# Patient Record
Sex: FEMALE | Race: BLACK OR AFRICAN AMERICAN | NOT HISPANIC OR LATINO | Employment: FULL TIME | URBAN - METROPOLITAN AREA
[De-identification: names, ages, dates, MRNs, and addresses within clinical notes are randomized per-mention and may not be internally consistent; named-entity substitution may affect disease eponyms.]

---

## 2017-08-01 ENCOUNTER — ALLSCRIPTS OFFICE VISIT (OUTPATIENT)
Dept: OTHER | Facility: OTHER | Age: 52
End: 2017-08-01

## 2017-08-01 ENCOUNTER — GENERIC CONVERSION - ENCOUNTER (OUTPATIENT)
Dept: OTHER | Facility: OTHER | Age: 52
End: 2017-08-01

## 2017-08-01 ENCOUNTER — TRANSCRIBE ORDERS (OUTPATIENT)
Dept: LAB | Facility: CLINIC | Age: 52
End: 2017-08-01

## 2017-08-01 ENCOUNTER — APPOINTMENT (OUTPATIENT)
Dept: LAB | Facility: CLINIC | Age: 52
End: 2017-08-01
Payer: COMMERCIAL

## 2017-08-01 DIAGNOSIS — L20.82 FLEXURAL ECZEMA: ICD-10-CM

## 2017-08-01 DIAGNOSIS — R10.12 LEFT UPPER QUADRANT PAIN: ICD-10-CM

## 2017-08-01 DIAGNOSIS — J45.909 UNCOMPLICATED ASTHMA: ICD-10-CM

## 2017-08-01 DIAGNOSIS — E66.3 OVERWEIGHT(278.02): ICD-10-CM

## 2017-08-01 DIAGNOSIS — Z12.31 ENCOUNTER FOR SCREENING MAMMOGRAM FOR MALIGNANT NEOPLASM OF BREAST: ICD-10-CM

## 2017-08-01 LAB
25(OH)D3 SERPL-MCNC: 12.1 NG/ML (ref 30–100)
ALBUMIN SERPL BCP-MCNC: 4 G/DL (ref 3.5–5)
ALP SERPL-CCNC: 84 U/L (ref 46–116)
ALT SERPL W P-5'-P-CCNC: 21 U/L (ref 12–78)
ANION GAP SERPL CALCULATED.3IONS-SCNC: 9 MMOL/L (ref 4–13)
AST SERPL W P-5'-P-CCNC: 23 U/L (ref 5–45)
BASOPHILS # BLD AUTO: 0.01 THOUSANDS/ΜL (ref 0–0.1)
BASOPHILS NFR BLD AUTO: 0 % (ref 0–1)
BILIRUB SERPL-MCNC: 0.39 MG/DL (ref 0.2–1)
BUN SERPL-MCNC: 13 MG/DL (ref 5–25)
CALCIUM SERPL-MCNC: 9.3 MG/DL (ref 8.3–10.1)
CHLORIDE SERPL-SCNC: 107 MMOL/L (ref 100–108)
CHOLEST SERPL-MCNC: 176 MG/DL (ref 50–200)
CO2 SERPL-SCNC: 25 MMOL/L (ref 21–32)
CREAT SERPL-MCNC: 0.79 MG/DL (ref 0.6–1.3)
CREAT UR-MCNC: 188 MG/DL
EOSINOPHIL # BLD AUTO: 0.08 THOUSAND/ΜL (ref 0–0.61)
EOSINOPHIL NFR BLD AUTO: 1 % (ref 0–6)
ERYTHROCYTE [DISTWIDTH] IN BLOOD BY AUTOMATED COUNT: 15.1 % (ref 11.6–15.1)
GFR SERPL CREATININE-BSD FRML MDRD: 100 ML/MIN/1.73SQ M
GLUCOSE P FAST SERPL-MCNC: 110 MG/DL (ref 65–99)
HCT VFR BLD AUTO: 37 % (ref 34.8–46.1)
HDLC SERPL-MCNC: 46 MG/DL (ref 40–60)
HGB BLD-MCNC: 11.7 G/DL (ref 11.5–15.4)
LDLC SERPL CALC-MCNC: 102 MG/DL (ref 0–100)
LYMPHOCYTES # BLD AUTO: 1.99 THOUSANDS/ΜL (ref 0.6–4.47)
LYMPHOCYTES NFR BLD AUTO: 33 % (ref 14–44)
MCH RBC QN AUTO: 25.8 PG (ref 26.8–34.3)
MCHC RBC AUTO-ENTMCNC: 31.6 G/DL (ref 31.4–37.4)
MCV RBC AUTO: 82 FL (ref 82–98)
MICROALBUMIN UR-MCNC: 10.4 MG/L (ref 0–20)
MICROALBUMIN/CREAT 24H UR: 6 MG/G CREATININE (ref 0–30)
MONOCYTES # BLD AUTO: 0.39 THOUSAND/ΜL (ref 0.17–1.22)
MONOCYTES NFR BLD AUTO: 7 % (ref 4–12)
NEUTROPHILS # BLD AUTO: 3.55 THOUSANDS/ΜL (ref 1.85–7.62)
NEUTS SEG NFR BLD AUTO: 59 % (ref 43–75)
NRBC BLD AUTO-RTO: 0 /100 WBCS
PLATELET # BLD AUTO: 338 THOUSANDS/UL (ref 149–390)
PMV BLD AUTO: 10.9 FL (ref 8.9–12.7)
POTASSIUM SERPL-SCNC: 3.7 MMOL/L (ref 3.5–5.3)
PROT SERPL-MCNC: 8 G/DL (ref 6.4–8.2)
RBC # BLD AUTO: 4.54 MILLION/UL (ref 3.81–5.12)
SODIUM SERPL-SCNC: 141 MMOL/L (ref 136–145)
TRIGL SERPL-MCNC: 141 MG/DL
TSH SERPL DL<=0.05 MIU/L-ACNC: 2.04 UIU/ML (ref 0.36–3.74)
WBC # BLD AUTO: 6.04 THOUSAND/UL (ref 4.31–10.16)

## 2017-08-01 PROCEDURE — 80061 LIPID PANEL: CPT

## 2017-08-01 PROCEDURE — 82306 VITAMIN D 25 HYDROXY: CPT

## 2017-08-01 PROCEDURE — 80053 COMPREHEN METABOLIC PANEL: CPT

## 2017-08-01 PROCEDURE — 36415 COLL VENOUS BLD VENIPUNCTURE: CPT

## 2017-08-01 PROCEDURE — 84443 ASSAY THYROID STIM HORMONE: CPT

## 2017-08-01 PROCEDURE — 85025 COMPLETE CBC W/AUTO DIFF WBC: CPT

## 2017-08-01 PROCEDURE — 82043 UR ALBUMIN QUANTITATIVE: CPT

## 2017-08-01 PROCEDURE — 82570 ASSAY OF URINE CREATININE: CPT

## 2017-08-18 ENCOUNTER — HOSPITAL ENCOUNTER (OUTPATIENT)
Dept: ULTRASOUND IMAGING | Facility: HOSPITAL | Age: 52
Discharge: HOME | End: 2017-08-18
Payer: COMMERCIAL

## 2017-08-18 DIAGNOSIS — R10.12 LEFT UPPER QUADRANT PAIN: ICD-10-CM

## 2017-08-18 PROCEDURE — 76700 US EXAM ABDOM COMPLETE: CPT

## 2017-08-21 ENCOUNTER — HOSPITAL ENCOUNTER (OUTPATIENT)
Dept: MAMMOGRAPHY | Facility: HOSPITAL | Age: 52
Discharge: HOME | End: 2017-08-21
Payer: COMMERCIAL

## 2017-08-21 DIAGNOSIS — L20.82 FLEXURAL ECZEMA: ICD-10-CM

## 2017-08-21 DIAGNOSIS — Z12.31 ENCOUNTER FOR SCREENING MAMMOGRAM FOR MALIGNANT NEOPLASM OF BREAST: ICD-10-CM

## 2017-08-21 PROCEDURE — G0202 SCR MAMMO BI INCL CAD: HCPCS

## 2017-09-06 ENCOUNTER — GENERIC CONVERSION - ENCOUNTER (OUTPATIENT)
Dept: OTHER | Facility: OTHER | Age: 52
End: 2017-09-06

## 2017-09-07 ENCOUNTER — GENERIC CONVERSION - ENCOUNTER (OUTPATIENT)
Dept: OTHER | Facility: OTHER | Age: 52
End: 2017-09-07

## 2018-01-15 VITALS
BODY MASS INDEX: 29.45 KG/M2 | HEIGHT: 64 IN | TEMPERATURE: 96 F | RESPIRATION RATE: 16 BRPM | OXYGEN SATURATION: 99 % | SYSTOLIC BLOOD PRESSURE: 130 MMHG | DIASTOLIC BLOOD PRESSURE: 74 MMHG | WEIGHT: 172.5 LBS | HEART RATE: 78 BPM

## 2018-01-15 NOTE — RESULT NOTES
Discussion/Summary   pls let pt know mammogram was normal   abdominal US shows a small cyst in the liver unchanged compared to when she had the CT scan done  Nothing to do at this time      Verified Results  * Bishop Nieves CAD [id number] 09:45AM Jimena Block Order Number: [id number]    - Patient Instructions: To schedule this appointment, please contact Central Scheduling at [phone]  Do not wear any perfume, powder, lotion or deodorant on breast or underarm area  Please bring your doctors order, referral (if needed) and insurance information with you on the day of the test  Failure to bring this information may result in this test being rescheduled  Arrive 15 minutes prior to your appointment time to register  On the day of your test, please bring any prior mammogram or breast studies with you that were not performed at a [facility]  Failure to bring prior exams may result in your test needing to be rescheduled  Test Name Result Flag Reference   MAMMO SCREENING BILATERAL W CAD (Report)     Patient History:   Patient is postmenopausal    Family history of breast cancer at age 39 in maternal aunt,    breast cancer at age 48 or over in maternal aunt  No Hormone Replacement Therapy   Patient has never smoked  Patient's BMI is 27 8  Reason for exam: screening, asymptomatic  Mammo Screening Bilateral W CAD: August 21, 2017 - Check In #:    [de-identified]   Bilateral MLO and CC view(s) were taken  Technologist: SANDEE Nixon (SANDEE)(M)   Prior study comparison: August 10, 2015, bilateral AMA dig scrn    mamm w/CAD performed at [place] [address]  April 28, 2014, bilateral digital screening mammogram, performed    at [place] [address]  2011, bilateral    screening mammogram, performed at [facility]  There are scattered fibroglandular densities       No dominant soft tissue mass, architectural distortion or    suspicious calcifications are noted in either breast  The skin    and nipple contours are within normal limits  No evidence of malignancy  No significant changes when compared with prior studies  ACR BI-RADSï¾® Assessments: BiRad:1 - Negative     Recommendation:   Routine screening mammogram of both breasts in 1 year  Analyzed by CAD     The patient is scheduled in a reminder system  8-10% of cancers will be missed on mammography  Management of a    palpable abnormality must be based on clinical grounds  Patients   will be notified of their results via letter from our facility  Accredited by [employer] of [employer] and FDA  Transcription Location: SANDEE Morejon 98: [id number]     Risk Value(s):   Tyrer-Cuzick 10 Year: 2 900%, Tyrer-Cuzick Lifetime: 11 700%,    Myriad Table: 2 6%, LUISA 5 Year: 0 9%, NCI Lifetime: 6 6%, MRS    : Based on personal and/or family history,    consideration of hereditary risk assessment may be warranted  Signed by:   Zohreh Freeman MD   8/21/17     * US ABDOMEN COMPLETE [id number] 09:02AM Cheryl Yi    Order Number: [id number]    - Patient Instructions: To schedule this appointment, please contact Central Scheduling at [phone]  Test Name Result Flag Reference   US ABDOMEN COMPLETE (Report)     ABDOMEN ULTRASOUND, COMPLETE     INDICATION: Follow-up liver lesion     COMPARISON: 2/4/2013     TECHNIQUE:  Real-time ultrasound of the abdomen was performed with a curvilinear transducer with both volumetric sweeps and still imaging techniques  FINDINGS:     PANCREAS: Visualized portions of the pancreas are within normal limits  AORTA AND IVC: Visualized portions are normal for patient age  LIVER:   Size: Within normal range  The liver measures 15 6 cm in the midclavicular line  Contour: Surface contour is smooth  Parenchyma: Echogenicity and echotexture are within normal limits     No evidence of suspicious mass  There is a simple appearing cyst in the posterior right lobe measuring 3 1 x 2 4 x 4 3 cm  There is a thin septation  There is no internal vascularity  The appearance is similar to the prior CT  The main portal vein is patent and hepatopetal       BILIARY:   The gallbladder is normal in caliber  No wall thickening or pericholecystic fluid  No stones or sludge identified  Sonographic Winnie Pages sign is negative  No intrahepatic biliary dilatation  CBD measures 5 mm  No choledocholithiasis  KIDNEY:    Right kidney measures 10 6 x 3 3 cm  Within normal limits  Left kidney measures 11 7 x 6 1 cm  Within normal limits  SPLEEN:    Measures 11 3 cm  Within normal limits  ASCITES: None  IMPRESSION:     Septated, 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

## 2018-01-15 NOTE — RESULT NOTES
Discussion/Summary   please let pt know her BW looks good except for her Vit D which is very low at 12, should be at least 30 ideally closer to 60  Sent in Vit D 45591 IU take once weekly  Rest of her labs are all good      Verified Results  (1) CBC/PLT/DIFF [id number] 10:14AM [facility] Order Number: [id number]     Test Name Result Flag Reference   WBC COUNT 6 04 Thousand/uL  4 31-10 16   RBC COUNT 4 54 Million/uL  3 81-5 12   HEMOGLOBIN 11 7 g/dL  11 5-15 4   HEMATOCRIT 37 0 %  34 8-46  1   MCV 82 fL  82-98   MCH 25 8 pg L 26 8-34 3   MCHC 31 6 g/dL  31 4-37 4   RDW 15 1 %  11 6-15 1   MPV 10 9 fL  8 9-12 7   PLATELET COUNT 541 Thousands/uL  149-390   nRBC AUTOMATED 0 /100 WBCs     NEUTROPHILS RELATIVE PERCENT 59 %  43-75   LYMPHOCYTES RELATIVE PERCENT 33 %  14-44   MONOCYTES RELATIVE PERCENT 7 %  4-12   EOSINOPHILS RELATIVE PERCENT 1 %  0-6   BASOPHILS RELATIVE PERCENT 0 %  0-1   NEUTROPHILS ABSOLUTE COUNT 3 55 Thousands/? ??L  1 85-7 62   LYMPHOCYTES ABSOLUTE COUNT 1 99 Thousands/? ??L  0 60-4 47   MONOCYTES ABSOLUTE COUNT 0 39 Thousand/? ??L  0 17-1 22   EOSINOPHILS ABSOLUTE COUNT 0 08 Thousand/? ??L  0 00-0 61   BASOPHILS ABSOLUTE COUNT 0 01 Thousands/? ??L  0 00-0 10     (1) COMPREHENSIVE METABOLIC PANEL [id number] 48:45GQ [facility] Order Number: [id number]     Test Name Result Flag Reference   SODIUM 141 mmol/L  136-145   POTASSIUM 3 7 mmol/L  3 5-5 3   CHLORIDE 107 mmol/L  100-108   CARBON DIOXIDE 25 mmol/L  21-32   ANION GAP (CALC) 9 mmol/L  4-13   BLOOD UREA NITROGEN 13 mg/dL  5-25   CREATININE 0 79 mg/dL  0 60-1 30   Standardized to IDMS reference method   CALCIUM 9 3 mg/dL  8 3-10 1   BILI, TOTAL 0 39 mg/dL  0 20-1 00   ALK PHOSPHATAS 84 U/L     ALT (SGPT) 21 U/L  12-78   AST(SGOT) 23 U/L  5-45   ALBUMIN 4 0 g/dL  3 5-5 0   TOTAL PROTEIN 8 0 g/dL  6 4-8 2   eGFR 100 ml/min/1 73sq m     National Kidney Disease Education Program recommendations are as follows:  GFR calculation is accurate only with a steady state creatinine  Chronic Kidney disease less than 60 ml/min/1 73 sq  meters  Kidney failure less than 15 ml/min/1 73 sq  meters  GLUCOSE FASTING 110 mg/dL H 65-99     (1) LIPID PANEL FASTING W DIRECT LDL REFLEX 01Aug2017 10:14AM Marie Form   [employer] Order Number: [id number]     Test Name Result Flag Reference   CHOLESTEROL 176 mg/dL     LDL CHOLESTEROL CALCULATED 102 mg/dL H 0-100   Triglyceride:         Normal              <150 mg/dl       Borderline High    150-199 mg/dl       High               200-499 mg/dl       Very High          >499 mg/dl  Cholesterol:         Desirable        <200 mg/dl      Borderline High  200-239 mg/dl      High             >239 mg/dl  HDL Cholesterol:        High    >59 mg/dL      Low     <41 mg/dL  LDL Cholesterol:        Optimal          <100 mg/dl        Near Optimal     100-129 mg/dl        Above Optimal          Borderline High   130-159 mg/dl          High              160-189 mg/dl          Very High        >189 mg/dl  LDL CALCULATED:    This screening LDL is a calculated result  It does not have the accuracy of the Direct Measured LDL in the monitoring of patients with hyperlipidemia and/or statin therapy  Direct Measure LDL (HJV544) must be ordered separately in these patients  TRIGLYCERIDES 141 mg/dL  <=150   Specimen collection should occur prior to N-Acetylcysteine or Metamizole administration due to the potential for falsely depressed results  HDL,DIRECT 46 mg/dL  40-60   Specimen collection should occur prior to Metamizole administration due to the potential for falsely depressed results       (1) MICROALBUMIN CREATININE RATIO, RANDOM URINE 01Aug2017 10:14AM Marie Form   [employer] Order Number: [id number]     Test Name Result Flag Reference   MICROALBUMIN/ CREAT R 6 mg/g creatinine  0-30   MICROALBUMIN,URINE 10 4 mg/L  0 0-20 0   CREATININE URINE 188 0 mg/dL       (1) TSH WITH FT4 REFLEX [id number] 10: 14AM Roland Espinosa Order Number: [id number]     Test Name Result Flag Reference   TSH 2 040 uIU/mL  0 358-3 740   Patients undergoing fluorescein dye angiography may retain small amounts of fluorescein in the body for 48-72 hours post procedure  Samples containing fluorescein can produce falsely depressed TSH values  If the patient had this procedure,a specimen should be resubmitted post fluorescein clearance  The recommended reference ranges for TSH during pregnancy are as follows:  First trimester 0 1 to 2 5 uIU/mL  Second trimester  0 2 to 3 0 uIU/mL  Third trimester 0 3 to 3 0 uIU/m     (1) VITAMIN D 25-HYDROXY [id number] 10:14AM Cami Gundreson    Order Number: [id number]     Test Name Result Flag Reference   VIT D 25-HYDROX 12 1 ng/mL L 30 0-100 0   This assay is a certified procedure of the CDC Vitamin D Standardization Certification Program (VDSCP)     Deficiency <20ng/ml   Insufficiency 20-30ng/ml   Sufficient  ng/ml     *Patients undergoing fluorescein dye angiography may retain small amounts of fluorescein in the body for 48-72 hours post procedure  Samples containing fluorescein can produce falsely elevated Vitamin D values  If the patient had this procedure, a specimen should be resubmitted post fluorescein clearance         Signatures   Electronically signed by : Lorrain Closs, M D ; Aug  1 2017  4:41PM EST                       (Author)

## 2018-01-22 VITALS
HEART RATE: 86 BPM | DIASTOLIC BLOOD PRESSURE: 76 MMHG | BODY MASS INDEX: 29.46 KG/M2 | RESPIRATION RATE: 18 BRPM | SYSTOLIC BLOOD PRESSURE: 118 MMHG | TEMPERATURE: 97.2 F | WEIGHT: 172.56 LBS | HEIGHT: 64 IN | OXYGEN SATURATION: 98 %

## 2018-02-20 RX ORDER — ERGOCALCIFEROL 1.25 MG/1
CAPSULE ORAL
Qty: 4 CAPSULE | Refills: 0 | OUTPATIENT
Start: 2018-02-20

## 2018-02-28 ENCOUNTER — HOSPITAL ENCOUNTER (EMERGENCY)
Facility: HOSPITAL | Age: 53
Discharge: HOME | End: 2018-02-28
Attending: EMERGENCY MEDICINE | Admitting: EMERGENCY MEDICINE
Payer: COMMERCIAL

## 2018-02-28 VITALS
SYSTOLIC BLOOD PRESSURE: 144 MMHG | WEIGHT: 167 LBS | RESPIRATION RATE: 18 BRPM | DIASTOLIC BLOOD PRESSURE: 68 MMHG | HEART RATE: 102 BPM | TEMPERATURE: 98.2 F | OXYGEN SATURATION: 98 % | BODY MASS INDEX: 28.67 KG/M2

## 2018-02-28 DIAGNOSIS — J06.9 VIRAL URI WITH COUGH: Primary | ICD-10-CM

## 2018-02-28 PROCEDURE — 99283 EMERGENCY DEPT VISIT LOW MDM: CPT

## 2018-02-28 RX ORDER — ACETAMINOPHEN 500 MG
1000 TABLET ORAL EVERY 6 HOURS PRN
Qty: 30 TABLET | Refills: 0 | Status: SHIPPED | OUTPATIENT
Start: 2018-02-28 | End: 2019-04-25 | Stop reason: ALTCHOICE

## 2018-02-28 RX ORDER — ONDANSETRON 4 MG/1
4 TABLET, FILM COATED ORAL EVERY 12 HOURS PRN
Qty: 10 TABLET | Refills: 0 | Status: SHIPPED | OUTPATIENT
Start: 2018-02-28 | End: 2018-03-09

## 2018-02-28 RX ORDER — IBUPROFEN 600 MG/1
600 TABLET ORAL EVERY 6 HOURS PRN
Qty: 30 TABLET | Refills: 0 | Status: SHIPPED | OUTPATIENT
Start: 2018-02-28 | End: 2019-04-25 | Stop reason: ALTCHOICE

## 2018-02-28 RX ORDER — FAMOTIDINE 20 MG/1
20 TABLET, FILM COATED ORAL DAILY
COMMUNITY
End: 2020-02-13

## 2018-02-28 RX ORDER — ALBUTEROL SULFATE 90 UG/1
2 AEROSOL, METERED RESPIRATORY (INHALATION) EVERY 6 HOURS PRN
COMMUNITY
End: 2019-05-13 | Stop reason: SDUPTHER

## 2018-02-28 RX ORDER — GUAIFENESIN 600 MG
1200 TABLET, EXTENDED RELEASE 12 HR ORAL EVERY 12 HOURS SCHEDULED
Qty: 28 TABLET | Refills: 0 | Status: SHIPPED | OUTPATIENT
Start: 2018-02-28 | End: 2018-03-07

## 2018-02-28 NOTE — DISCHARGE INSTRUCTIONS
Ibuprofen 600mg by mouth every 6 hours as needed for mild to moderate pain or fever  Acetaminophen 650mg by mouth every 8 hours as needed for mild to moderate pain or fever  You can take Ibuprofen and Acetaminophen together safely  Mucinex as needed for cough  Zofran as needed for nausea and vomiting  The most important thing is that you stay hydrated  Pedialyte is best to help replenish electrolytes  Follow up with your primary care doctor in 3-5 days for reevaluation and to ensure you are getting better  Return to the ED for worsening fevers that are not controlled with BOTH Ibuprofen and Tylenol, chest pain, shortness of breath, or any other concerns  Upper Respiratory Infection, Ambulatory Care   GENERAL INFORMATION:   An upper respiratory infection  is also called a common cold  It can affect your nose, throat, ears, and sinuses  Common symptoms include the following:   · Runny or stuffy nose    · Sneezing and coughing    · Sore throat or hoarseness    · Red, watery, and sore eyes    · Tiredness or restlessness    · Chills and fever    · Headache, body aches, or sore muscles  Seek immediate care for the following symptoms:   · Headaches or a stiff neck    · Bright lights hurt your eyes    · Chest pain or trouble breathing  Treatment for an upper respiratory infection  may include any of the following:  · Decongestants  help decrease nasal congestion and improve your breathing  Do not use decongestant sprays for more than a few days  · Cough suppressants  help decrease coughing  Ask your healthcare provider which type of cough medicine is best for you  Some cough medicines need a doctor's order  · NSAIDs  help decrease swelling and pain or fever  This medicine is available with or without a doctor's order  NSAIDs can cause stomach bleeding or kidney problems in certain people  If you take blood thinner medicine, always ask your healthcare provider if NSAIDs are safe for you   Always read the medicine label and follow directions  Care for an upper respiratory infection:   · Rest  until your fever is gone or you feel better  · Drink liquids as directed to prevent dehydration  You may need to drink 8 to 10 cups of liquid each day  Good liquids to drink include water, ginger ale, tea, or fruit juices  · Gargle  with warm salt water to help your sore throat feel better  Mix ¼ teaspoon salt with 1 cup warm water  You may also suck on hard candy or throat lozenges  · Saline nasal drops  help loosen your nasal congestion  They can be bought without a doctor's order  · Take a warm bath or shower  to help decrease body aches and help you breathe easier  · Use a cool-mist humidifier  to increase air moisture and make it easier for you to breathe  Prevent the spread of germs:   · Avoid others for the first 2 to 3 days of your cold  Germs are easily spread during this time  · Do not share food, drinks,  towels, or personal items with others  · Wash your hands often  Use soap and water  Wash your hands after you use the bathroom, change a child's diapers, or sneeze  Wash your hands before you prepare or eat food  Cover your mouth and nose with a tissue when you sneeze or cough  Follow up with your healthcare provider as directed:  Write down your questions so you remember to ask them during your visits  CARE AGREEMENT:   You have the right to help plan your care  Learn about your health condition and how it may be treated  Discuss treatment options with your caregivers to decide what care you want to receive  You always have the right to refuse treatment  The above information is an  only  It is not intended as medical advice for individual conditions or treatments  Talk to your doctor, nurse or pharmacist before following any medical regimen to see if it is safe and effective for you    © 2014 [address] [employer] [employer] is for End User's use only and may not be sold, redistributed or otherwise used for commercial purposes  All illustrations and images included in CareNotes® are the copyrighted property of A D A M , Inc  or Gregory Romero

## 2018-02-28 NOTE — ED PROVIDER NOTES
History  Chief Complaint   Patient presents with    Flu Symptoms     patient states symptoms since monday, reports chills, body aches, a cough, and dizziness  55-year-old female with past medical history of asthma, who presents to the emergency department for flu-like symptoms for the past 2 days  Endorses symptoms of low-grade fever, nasal congestion, rhinorrhea, generalized headache, lightheadedness, mild productive cough with green sputum, and generalized body aches  Patient also endorses mild shortness of breath yesterday that was quickly resolved using her albuterol inhaler  Denies any shortness of breath currently  Also endorses nausea and vomiting for 2 episodes yesterday  Denies any blood in the vomit  Denies any episodes today  Denies any chest pain, abdominal pain, diarrhea, urinary pain or frequency or urgency  Does have positive ill contacts at work, with people who have been formally diagnosed with the flu  Has attempted to resolve symptoms using their flu, Tylenol and 1 dose of Motrin 2 days ago with minimal improvement in symptoms  History provided by:  Patient   used: No        Prior to Admission Medications   Prescriptions Last Dose Informant Patient Reported? Taking? albuterol (PROVENTIL HFA,VENTOLIN HFA) 90 mcg/act inhaler   Yes Yes   Sig: Inhale 2 puffs every 6 (six) hours as needed for wheezing   famotidine (PEPCID) 20 mg tablet   Yes Yes   Sig: Take 20 mg by mouth daily      Facility-Administered Medications: None       Past Medical History:   Diagnosis Date    Asthma        History reviewed  No pertinent surgical history  History reviewed  No pertinent family history  I have reviewed and agree with the history as documented      Social History   Substance Use Topics    Smoking status: Never Smoker    Smokeless tobacco: Not on file    Alcohol use Yes      Comment: socially        Review of Systems   Constitutional: Positive for appetite change, fatigue and fever  Negative for chills  HENT: Positive for congestion and rhinorrhea  Negative for ear pain, facial swelling, sinus pain, sinus pressure, sore throat, trouble swallowing and voice change  Eyes: Negative  Respiratory: Positive for cough and shortness of breath  Negative for chest tightness and wheezing  Cardiovascular: Negative for chest pain, palpitations and leg swelling  Gastrointestinal: Positive for nausea and vomiting  Negative for abdominal pain, constipation and diarrhea  Genitourinary: Negative for dysuria, flank pain, frequency, hematuria, urgency, vaginal bleeding, vaginal discharge and vaginal pain  Musculoskeletal: Positive for myalgias  Negative for arthralgias, back pain, gait problem, joint swelling, neck pain and neck stiffness  Skin: Negative for color change, pallor, rash and wound  Neurological: Positive for light-headedness and headaches  Negative for dizziness, tremors, seizures, syncope, facial asymmetry, speech difficulty, weakness and numbness  Psychiatric/Behavioral: Negative  Physical Exam  ED Triage Vitals   Temperature Pulse Respirations Blood Pressure SpO2   02/28/18 1332 02/28/18 1333 02/28/18 1333 02/28/18 1333 02/28/18 1333   98 2 °F (36 8 °C) 102 18 144/68 98 %      Temp Source Heart Rate Source Patient Position - Orthostatic VS BP Location FiO2 (%)   02/28/18 1332 02/28/18 1333 02/28/18 1333 -- --   Oral Monitor Sitting        Pain Score       02/28/18 1332       7           Orthostatic Vital Signs  Vitals:    02/28/18 1333   BP: 144/68   Pulse: 102   Patient Position - Orthostatic VS: Sitting       Physical Exam   Constitutional: She is oriented to person, place, and time  She appears well-developed and well-nourished  HENT:   Head: Normocephalic and atraumatic  Bilateral Tms are non-bulging and without erythema  Posterior oropharynx with mild erythema  No edema  No tonsilar hypertrophy  Uvula is midline and non-edematous  Eyes: Conjunctivae and EOM are normal  Pupils are equal, round, and reactive to light  Neck: Normal range of motion  Neck supple  Cardiovascular: Normal rate, regular rhythm and intact distal pulses  Pulmonary/Chest: Effort normal and breath sounds normal  She has no wheezes  She has no rales  Abdominal: Soft  Bowel sounds are normal  She exhibits no distension  There is no tenderness  There is no rebound and no guarding  Musculoskeletal: Normal range of motion  She exhibits no edema or tenderness  Lymphadenopathy:     She has cervical adenopathy  Neurological: She is alert and oriented to person, place, and time  No cranial nerve deficit or sensory deficit  She exhibits normal muscle tone  Coordination normal    Skin: Skin is warm and dry  Capillary refill takes less than 2 seconds  Psychiatric: She has a normal mood and affect  Her behavior is normal    Nursing note and vitals reviewed  ED Medications  Medications - No data to display    Diagnostic Studies  Results Reviewed     None                 No orders to display              Procedures  Procedures       Phone Contacts  ED Phone Contact    ED Course  ED Course                                MDM  Number of Diagnoses or Management Options  Viral URI with cough:   Diagnosis management comments: 41-year-old female with past medical history of asthma, who presents to the emergency department for flu-like symptoms for the past 2 days  Differential Diagnosis includes but is not limited to:  Viral illness, viral bronchitis, viral bronchiolitis, viral upper respiratory infection, influenza, pneumonia  Low suspicion for meningitis, urinary tract infection, serious bacterial illness  Given normal lung exam, low suspicion for pneumonia  Patient is well-appearing  Patient is drinking, and tolerating oral fluids  Pt re-educated on the importance of fever control and oral hydration during this time    Instructed to follow up with primary care doctor in 3-5 days  CritCare Time    Disposition  Final diagnoses:   Viral URI with cough     Time reflects when diagnosis was documented in both MDM as applicable and the Disposition within this note     Time User Action Codes Description Comment    2/28/2018  2:02 PM Ashley Antony Add [J06 [id number],  B97 [id number]] Viral URI with cough       ED Disposition     ED Disposition Condition Comment    Discharge  [facility] discharge to home/self care  Condition at discharge: Good        Follow-up Information     Follow up With Specialties Details Why Jaskaran Ching MD Family Medicine In 1 week  [address] [place] [address]  [phone]          Discharge Medication List as of 2/28/2018  2:05 PM      START taking these medications    Details   acetaminophen (TYLENOL) 500 mg tablet Take 2 tablets (1,000 mg total) by mouth every 6 (six) hours as needed for mild pain, Starting Wed 2/28/2018, Print      guaiFENesin (MUCINEX) 600 mg 12 hr tablet Take 2 tablets (1,200 mg total) by mouth every 12 (twelve) hours for 7 days, Starting Wed 2/28/2018, Until Wed 3/7/2018, Print      ibuprofen (MOTRIN) 600 mg tablet Take 1 tablet (600 mg total) by mouth every 6 (six) hours as needed for moderate pain or fever, Starting Wed 2/28/2018, Print      ondansetron (ZOFRAN) 4 mg tablet Take 1 tablet (4 mg total) by mouth every 12 (twelve) hours as needed for nausea or vomiting for up to 5 days, Starting Wed 2/28/2018, Until Mon 3/5/2018, Print         CONTINUE these medications which have NOT CHANGED    Details   albuterol (PROVENTIL HFA,VENTOLIN HFA) 90 mcg/act inhaler Inhale 2 puffs every 6 (six) hours as needed for wheezing, Historical Med      famotidine (PEPCID) 20 mg tablet Take 20 mg by mouth daily, Historical Med           No discharge procedures on file      ED Provider  Electronically Signed by           Dayanna Connelly PA-C  02/28/18 3101

## 2018-03-09 ENCOUNTER — OFFICE VISIT (OUTPATIENT)
Dept: FAMILY MEDICINE CLINIC | Facility: CLINIC | Age: 53
End: 2018-03-09
Payer: COMMERCIAL

## 2018-03-09 VITALS
SYSTOLIC BLOOD PRESSURE: 142 MMHG | DIASTOLIC BLOOD PRESSURE: 76 MMHG | WEIGHT: 168 LBS | HEART RATE: 72 BPM | TEMPERATURE: 96.4 F | RESPIRATION RATE: 18 BRPM | HEIGHT: 63 IN | BODY MASS INDEX: 29.77 KG/M2

## 2018-03-09 DIAGNOSIS — E55.9 VITAMIN D DEFICIENCY: ICD-10-CM

## 2018-03-09 DIAGNOSIS — J06.9 ACUTE UPPER RESPIRATORY INFECTION: ICD-10-CM

## 2018-03-09 DIAGNOSIS — Z12.11 SCREENING FOR COLON CANCER: Primary | ICD-10-CM

## 2018-03-09 PROBLEM — L20.82 FLEXURAL ECZEMA: Status: ACTIVE | Noted: 2017-08-01

## 2018-03-09 PROBLEM — G56.01 CARPAL TUNNEL SYNDROME OF RIGHT WRIST: Status: ACTIVE | Noted: 2017-09-07

## 2018-03-09 PROBLEM — E66.3 OVERWEIGHT: Status: ACTIVE | Noted: 2017-08-01

## 2018-03-09 PROBLEM — J45.909 ASTHMA: Status: ACTIVE | Noted: 2017-08-01

## 2018-03-09 PROBLEM — R10.12 ABDOMINAL PAIN, LEFT UPPER QUADRANT: Status: ACTIVE | Noted: 2017-08-01

## 2018-03-09 PROBLEM — R12 HEART BURN: Status: ACTIVE | Noted: 2017-09-07

## 2018-03-09 PROCEDURE — 99214 OFFICE O/P EST MOD 30 MIN: CPT | Performed by: PHYSICIAN ASSISTANT

## 2018-03-09 PROCEDURE — 3008F BODY MASS INDEX DOCD: CPT | Performed by: PHYSICIAN ASSISTANT

## 2018-03-09 RX ORDER — ALBUTEROL SULFATE 2.5 MG/3ML
SOLUTION RESPIRATORY (INHALATION)
COMMUNITY
End: 2019-05-13

## 2018-03-09 RX ORDER — TRIAMCINOLONE ACETONIDE 0.25 MG/G
OINTMENT TOPICAL
Refills: 1 | COMMUNITY
Start: 2018-02-21 | End: 2019-05-13 | Stop reason: SDUPTHER

## 2018-03-09 RX ORDER — PROMETHAZINE HYDROCHLORIDE AND CODEINE PHOSPHATE 6.25; 1 MG/5ML; MG/5ML
5 SYRUP ORAL EVERY 4 HOURS PRN
Qty: 120 ML | Refills: 0 | Status: SHIPPED | OUTPATIENT
Start: 2018-03-09 | End: 2019-04-25 | Stop reason: ALTCHOICE

## 2018-03-09 NOTE — PROGRESS NOTES
Assessment/Plan:    Patient Instructions   Promethazine with codeine cough syrup as needed as directed  Avoid with working or driving because the drowsy side effects  Can continue Mucinex DM during the day if not utilizing the promethazine with codeine  Over-the-counter Sudafed as needed as package direct  Increase clear liquids  Continue Ventolin as needed  Follow-up if there is no improvement over the next week or any symptoms increase  Lab results have been reviewed  Vitamin D level is low at 12  Recommend over-the-counter vitamin D 3 5000 international units daily  Phone number given for Dr Henrique Katz for colonoscopy screening  [employer] software was used to dictate this note  It may contain errors with dictating incorrect words/spelling  Please contact provider directly for any questions  Diagnoses and all orders for this visit:    Screening for colon cancer  -     Ambulatory referral to Gastroenterology; Future    Acute upper respiratory infection  -     promethazine-codeine (PHENERGAN WITH CODEINE) 6 25-10 mg/5 mL syrup; Take 5 mL by mouth every 4 (four) hours as needed for cough    Other orders  -     albuterol (2 5 mg/3 mL) 0 083 % nebulizer solution; Inhale  -     triamcinolone (KENALOG) 0 025 % ointment;           Subjective:      Patient ID: Libby Gan is a 47 y o  female  Patient presents today for evaluation of a cough that she has had for about 1 and half weeks  On occasion she does cough up some yellow mucus  She states in the beginning she did have a fever and chills  She has not checked her temperature over the last several days  She does have some night sweats  She did go the emergency room last week and was told it was viral   She has been utilizing a Ventolin inhaler as needed and Mucinex DM with minimal relief  Denies any ear pain, throat pain  She has been getting some bilateral rib pain with coughing      She also states that she was here back in August and had some blood work done but never really had a chance to discuss the results thoroughly with a provider  She would like to review those results again  She was on the vitamin-D supplement prescribed but had a hard time remembering to take it once a week  She was not compliant with the medication  Currently out of the medication  She has not had her routine colonoscopy  The following portions of the patient's history were reviewed and updated as appropriate:   She  has a past medical history of Asthma  She   Patient Active Problem List    Diagnosis Date Noted    Acute upper respiratory infection 03/09/2018    Carpal tunnel syndrome of right wrist 09/07/2017    Heart burn 09/07/2017    Asthma 08/01/2017    Flexural eczema 08/01/2017    Overweight 08/01/2017    Vitamin D deficiency 08/01/2017    Abdominal pain, left upper quadrant 08/01/2017     She  has no past surgical history on file  Her family history is not on file  She  reports that she has never smoked  She does not have any smokeless tobacco history on file  She reports that she drinks alcohol  She reports that she does not use drugs    Current Outpatient Prescriptions   Medication Sig Dispense Refill    acetaminophen (TYLENOL) 500 mg tablet Take 2 tablets (1,000 mg total) by mouth every 6 (six) hours as needed for mild pain 30 tablet 0    albuterol (2 5 mg/3 mL) 0 083 % nebulizer solution Inhale      albuterol (PROVENTIL HFA,VENTOLIN HFA) 90 mcg/act inhaler Inhale 2 puffs every 6 (six) hours as needed for wheezing      famotidine (PEPCID) 20 mg tablet Take 20 mg by mouth daily      ibuprofen (MOTRIN) 600 mg tablet Take 1 tablet (600 mg total) by mouth every 6 (six) hours as needed for moderate pain or fever 30 tablet 0    promethazine-codeine (PHENERGAN WITH CODEINE) 6 25-10 mg/5 mL syrup Take 5 mL by mouth every 4 (four) hours as needed for cough 120 mL 0    triamcinolone (KENALOG) 0 025 % ointment   1     No current facility-administered medications for this visit  Current Outpatient Prescriptions on File Prior to Visit   Medication Sig    acetaminophen (TYLENOL) 500 mg tablet Take 2 tablets (1,000 mg total) by mouth every 6 (six) hours as needed for mild pain    albuterol (PROVENTIL HFA,VENTOLIN HFA) 90 mcg/act inhaler Inhale 2 puffs every 6 (six) hours as needed for wheezing    famotidine (PEPCID) 20 mg tablet Take 20 mg by mouth daily    ibuprofen (MOTRIN) 600 mg tablet Take 1 tablet (600 mg total) by mouth every 6 (six) hours as needed for moderate pain or fever    [DISCONTINUED] ondansetron (ZOFRAN) 4 mg tablet Take 1 tablet (4 mg total) by mouth every 12 (twelve) hours as needed for nausea or vomiting for up to 5 days     No current facility-administered medications on file prior to visit  She has No Known Allergies       Review of Systems   Constitutional: Positive for chills  Negative for fever  HENT: Positive for postnasal drip, rhinorrhea and voice change  Negative for ear pain and sore throat  Respiratory: Positive for cough  Negative for shortness of breath  Cardiovascular: Positive for chest pain  Gastrointestinal: Negative for abdominal pain and blood in stool  Objective: There were no vitals taken for this visit  Physical Exam   Constitutional: She appears well-developed and well-nourished  No distress  HENT:   Head: Normocephalic and atraumatic  Right Ear: External ear normal    Left Ear: External ear normal    Mouth/Throat: Oropharynx is clear and moist    Neck: Normal range of motion  Neck supple  No thyromegaly present  Cardiovascular: Normal rate, regular rhythm and normal heart sounds  Exam reveals no gallop and no friction rub  No murmur heard  Pulmonary/Chest: Effort normal and breath sounds normal  No respiratory distress  She has no wheezes  She has no rales  Abdominal: Soft  Bowel sounds are normal  She exhibits no mass  There is no tenderness  Musculoskeletal: Normal range of motion  Lymphadenopathy:     She has no cervical adenopathy  Neurological: She is alert  Skin: Skin is warm  Psychiatric: She has a normal mood and affect

## 2018-03-09 NOTE — PATIENT INSTRUCTIONS
Promethazine with codeine cough syrup as needed as directed  Avoid with working or driving because the drowsy side effects  Can continue Mucinex DM during the day if not utilizing the promethazine with codeine  Over-the-counter Sudafed as needed as package direct  Increase clear liquids  Continue Ventolin as needed  Follow-up if there is no improvement over the next week or any symptoms increase  Lab results have been reviewed  Vitamin D level is low at 12  Recommend over-the-counter vitamin D 3 5000 international units daily    Phone number given for Dr Natalia Zuleta for colonoscopy screening

## 2018-08-13 RX ORDER — TRIAMCINOLONE ACETONIDE 0.25 MG/G
OINTMENT TOPICAL
Qty: 80 G | Refills: 0 | OUTPATIENT
Start: 2018-08-13

## 2019-04-25 ENCOUNTER — OFFICE VISIT (OUTPATIENT)
Dept: OBGYN CLINIC | Facility: CLINIC | Age: 54
End: 2019-04-25

## 2019-04-25 VITALS
DIASTOLIC BLOOD PRESSURE: 80 MMHG | SYSTOLIC BLOOD PRESSURE: 140 MMHG | WEIGHT: 173 LBS | HEIGHT: 63 IN | BODY MASS INDEX: 30.65 KG/M2

## 2019-04-25 DIAGNOSIS — Z12.4 SCREENING FOR CERVICAL CANCER: ICD-10-CM

## 2019-04-25 DIAGNOSIS — Z11.3 SCREEN FOR STD (SEXUALLY TRANSMITTED DISEASE): ICD-10-CM

## 2019-04-25 DIAGNOSIS — Z01.419 ENCOUNTER FOR GYNECOLOGICAL EXAMINATION WITHOUT ABNORMAL FINDING: Primary | ICD-10-CM

## 2019-04-25 DIAGNOSIS — Z12.39 SCREENING FOR BREAST CANCER: ICD-10-CM

## 2019-04-25 PROCEDURE — 99386 PREV VISIT NEW AGE 40-64: CPT | Performed by: NURSE PRACTITIONER

## 2019-04-25 PROCEDURE — 87624 HPV HI-RISK TYP POOLED RSLT: CPT | Performed by: NURSE PRACTITIONER

## 2019-04-25 PROCEDURE — G0145 SCR C/V CYTO,THINLAYER,RESCR: HCPCS | Performed by: NURSE PRACTITIONER

## 2019-04-25 RX ORDER — ERGOCALCIFEROL (VITAMIN D2) 50 MCG
CAPSULE ORAL
COMMUNITY
End: 2022-07-07

## 2019-04-26 LAB
HPV HR 12 DNA CVX QL NAA+PROBE: NEGATIVE
HPV16 DNA CVX QL NAA+PROBE: NEGATIVE
HPV18 DNA CVX QL NAA+PROBE: NEGATIVE

## 2019-04-26 PROCEDURE — 87591 N.GONORRHOEAE DNA AMP PROB: CPT | Performed by: NURSE PRACTITIONER

## 2019-04-26 PROCEDURE — 87491 CHLMYD TRACH DNA AMP PROBE: CPT | Performed by: NURSE PRACTITIONER

## 2019-04-29 ENCOUNTER — HOSPITAL ENCOUNTER (OUTPATIENT)
Dept: MAMMOGRAPHY | Facility: HOSPITAL | Age: 54
Discharge: HOME | End: 2019-04-29
Payer: COMMERCIAL

## 2019-04-29 VITALS — WEIGHT: 173 LBS | BODY MASS INDEX: 30.65 KG/M2 | HEIGHT: 63 IN

## 2019-04-29 DIAGNOSIS — Z12.39 SCREENING FOR BREAST CANCER: ICD-10-CM

## 2019-04-29 LAB
C TRACH DNA SPEC QL NAA+PROBE: NEGATIVE
N GONORRHOEA DNA SPEC QL NAA+PROBE: NEGATIVE

## 2019-04-29 PROCEDURE — 77067 SCR MAMMO BI INCL CAD: CPT

## 2019-04-30 LAB
LAB AP GYN PRIMARY INTERPRETATION: NORMAL
Lab: NORMAL

## 2019-05-09 ENCOUNTER — HOSPITAL ENCOUNTER (OUTPATIENT)
Dept: MAMMOGRAPHY | Facility: CLINIC | Age: 54
Discharge: HOME | End: 2019-05-09
Payer: COMMERCIAL

## 2019-05-09 ENCOUNTER — HOSPITAL ENCOUNTER (OUTPATIENT)
Dept: ULTRASOUND IMAGING | Facility: CLINIC | Age: 54
Discharge: HOME | End: 2019-05-09
Payer: COMMERCIAL

## 2019-05-09 VITALS — HEIGHT: 63 IN | BODY MASS INDEX: 30.65 KG/M2 | WEIGHT: 173 LBS

## 2019-05-09 DIAGNOSIS — R92.8 ABNORMAL MAMMOGRAM: ICD-10-CM

## 2019-05-09 PROCEDURE — 76642 ULTRASOUND BREAST LIMITED: CPT

## 2019-05-09 PROCEDURE — G0279 TOMOSYNTHESIS, MAMMO: HCPCS

## 2019-05-09 PROCEDURE — 77065 DX MAMMO INCL CAD UNI: CPT

## 2019-05-13 ENCOUNTER — TELEPHONE (OUTPATIENT)
Dept: FAMILY MEDICINE CLINIC | Facility: CLINIC | Age: 54
End: 2019-05-13

## 2019-05-13 ENCOUNTER — LAB (OUTPATIENT)
Dept: LAB | Facility: HOSPITAL | Age: 54
End: 2019-05-13
Payer: COMMERCIAL

## 2019-05-13 ENCOUNTER — OFFICE VISIT (OUTPATIENT)
Dept: FAMILY MEDICINE CLINIC | Facility: CLINIC | Age: 54
End: 2019-05-13

## 2019-05-13 VITALS
RESPIRATION RATE: 18 BRPM | WEIGHT: 174 LBS | TEMPERATURE: 97.3 F | HEART RATE: 65 BPM | OXYGEN SATURATION: 98 % | SYSTOLIC BLOOD PRESSURE: 130 MMHG | HEIGHT: 63 IN | BODY MASS INDEX: 30.83 KG/M2 | DIASTOLIC BLOOD PRESSURE: 80 MMHG

## 2019-05-13 DIAGNOSIS — R73.9 ELEVATED BLOOD SUGAR: ICD-10-CM

## 2019-05-13 DIAGNOSIS — R12 HEART BURN: ICD-10-CM

## 2019-05-13 DIAGNOSIS — L20.82 FLEXURAL ECZEMA: ICD-10-CM

## 2019-05-13 DIAGNOSIS — J45.20 MILD INTERMITTENT ASTHMA WITHOUT COMPLICATION: ICD-10-CM

## 2019-05-13 DIAGNOSIS — Z00.00 PREVENTATIVE HEALTH CARE: ICD-10-CM

## 2019-05-13 DIAGNOSIS — M54.2 NECK PAIN: Primary | ICD-10-CM

## 2019-05-13 DIAGNOSIS — Z12.11 SCREENING FOR COLON CANCER: ICD-10-CM

## 2019-05-13 DIAGNOSIS — E55.9 VITAMIN D DEFICIENCY: ICD-10-CM

## 2019-05-13 DIAGNOSIS — E66.3 OVERWEIGHT: ICD-10-CM

## 2019-05-13 PROBLEM — R10.12 ABDOMINAL PAIN, LEFT UPPER QUADRANT: Status: RESOLVED | Noted: 2017-08-01 | Resolved: 2019-05-13

## 2019-05-13 PROBLEM — J06.9 ACUTE UPPER RESPIRATORY INFECTION: Status: RESOLVED | Noted: 2018-03-09 | Resolved: 2019-05-13

## 2019-05-13 LAB
25(OH)D3 SERPL-MCNC: 34.8 NG/ML (ref 30–100)
ALBUMIN SERPL BCP-MCNC: 4.6 G/DL (ref 3–5.2)
ALP SERPL-CCNC: 95 U/L (ref 43–122)
ALT SERPL W P-5'-P-CCNC: 14 U/L (ref 9–52)
ANION GAP SERPL CALCULATED.3IONS-SCNC: 6 MMOL/L (ref 5–14)
AST SERPL W P-5'-P-CCNC: 25 U/L (ref 14–36)
BILIRUB SERPL-MCNC: 0.4 MG/DL
BUN SERPL-MCNC: 12 MG/DL (ref 5–25)
CALCIUM SERPL-MCNC: 9.5 MG/DL (ref 8.4–10.2)
CHLORIDE SERPL-SCNC: 104 MMOL/L (ref 97–108)
CHOLEST SERPL-MCNC: 183 MG/DL
CO2 SERPL-SCNC: 30 MMOL/L (ref 22–30)
CREAT SERPL-MCNC: 0.86 MG/DL (ref 0.6–1.2)
GFR SERPL CREATININE-BSD FRML MDRD: 90 ML/MIN/1.73SQ M
GLUCOSE P FAST SERPL-MCNC: 92 MG/DL (ref 70–99)
HDLC SERPL-MCNC: 45 MG/DL (ref 40–59)
LDLC SERPL CALC-MCNC: 104 MG/DL
NONHDLC SERPL-MCNC: 138 MG/DL
POTASSIUM SERPL-SCNC: 3.8 MMOL/L (ref 3.6–5)
PROT SERPL-MCNC: 8 G/DL (ref 5.9–8.4)
SODIUM SERPL-SCNC: 140 MMOL/L (ref 137–147)
TRIGL SERPL-MCNC: 169 MG/DL

## 2019-05-13 PROCEDURE — 99396 PREV VISIT EST AGE 40-64: CPT | Performed by: PHYSICIAN ASSISTANT

## 2019-05-13 PROCEDURE — 36415 COLL VENOUS BLD VENIPUNCTURE: CPT

## 2019-05-13 PROCEDURE — 82306 VITAMIN D 25 HYDROXY: CPT

## 2019-05-13 PROCEDURE — 80053 COMPREHEN METABOLIC PANEL: CPT

## 2019-05-13 PROCEDURE — 99214 OFFICE O/P EST MOD 30 MIN: CPT | Performed by: PHYSICIAN ASSISTANT

## 2019-05-13 PROCEDURE — 80061 LIPID PANEL: CPT

## 2019-05-13 RX ORDER — ALBUTEROL SULFATE 90 UG/1
2 AEROSOL, METERED RESPIRATORY (INHALATION) EVERY 6 HOURS PRN
Qty: 1 EACH | Refills: 1 | Status: SHIPPED | OUTPATIENT
Start: 2019-05-13 | End: 2020-06-08 | Stop reason: SDUPTHER

## 2019-05-13 RX ORDER — TRIAMCINOLONE ACETONIDE 0.25 MG/G
OINTMENT TOPICAL 2 TIMES DAILY
Qty: 30 G | Refills: 1 | Status: SHIPPED | OUTPATIENT
Start: 2019-05-13 | End: 2020-06-08 | Stop reason: SDUPTHER

## 2019-05-15 ENCOUNTER — TELEPHONE (OUTPATIENT)
Dept: FAMILY MEDICINE CLINIC | Facility: CLINIC | Age: 54
End: 2019-05-15

## 2020-02-13 ENCOUNTER — OFFICE VISIT (OUTPATIENT)
Dept: GASTROENTEROLOGY | Facility: MEDICAL CENTER | Age: 55
End: 2020-02-13
Payer: COMMERCIAL

## 2020-02-13 VITALS
HEART RATE: 78 BPM | HEIGHT: 63 IN | TEMPERATURE: 97.4 F | WEIGHT: 172 LBS | DIASTOLIC BLOOD PRESSURE: 80 MMHG | SYSTOLIC BLOOD PRESSURE: 128 MMHG | BODY MASS INDEX: 30.48 KG/M2

## 2020-02-13 DIAGNOSIS — K21.9 GASTROESOPHAGEAL REFLUX DISEASE, ESOPHAGITIS PRESENCE NOT SPECIFIED: Primary | ICD-10-CM

## 2020-02-13 DIAGNOSIS — Z12.11 COLON CANCER SCREENING: ICD-10-CM

## 2020-02-13 PROCEDURE — 3008F BODY MASS INDEX DOCD: CPT | Performed by: INTERNAL MEDICINE

## 2020-02-13 PROCEDURE — 1036F TOBACCO NON-USER: CPT | Performed by: INTERNAL MEDICINE

## 2020-02-13 PROCEDURE — 99204 OFFICE O/P NEW MOD 45 MIN: CPT | Performed by: INTERNAL MEDICINE

## 2020-02-13 RX ORDER — OMEPRAZOLE 20 MG/1
20 CAPSULE, DELAYED RELEASE ORAL DAILY
Qty: 90 CAPSULE | Refills: 3 | Status: SHIPPED | OUTPATIENT
Start: 2020-02-13 | End: 2021-03-12 | Stop reason: SDUPTHER

## 2020-02-13 NOTE — PROGRESS NOTES
[facility] - Outpatient Consultation  [facility] 49 y o  female MRN: [id number]  Encounter: [id number]          ASSESSMENT AND PLAN:      1  Gastroesophageal reflux disease, esophagitis presence not specified  She endorses history of chronic reflux with persistent symptoms despite regular use of once and sometimes twice daily Pepcid  I discussed dietary and lifestyle modifications for gastroesophageal reflux including sleeping with the head of the bed elevated, avoiding trigger foods, weight loss, not eating 2-3 hours before bed  I have prescribed omeprazole 20 mg daily to be taken 30 minutes before breakfast morning  Given her chronic reflux and persistent symptoms I recommended upper endoscopy for screening for Magaña's esophagus, evaluation for hiatal hernia or mucosal abnormality  - omeprazole (PriLOSEC) 20 mg delayed release capsule; Take 1 capsule (20 mg total) by mouth daily  Dispense: 90 capsule; Refill: 3  - EGD; Future  - continue dietary/lifestyle anti-reflux measures    2  Colon cancer screening  She has no prior modalities for colorectal cancer screening in the past   I discussed the indication, risk and benefits of colonoscopy for colon cancer screening with her today  Informed consent was obtained for the procedure  Risks of infection, perforation and hemorrhage were discussed  The patient was agreeable to proceed with the procedure  - Colonoscopy; Future    ______________________________________________________________________    HPI:  [facility] is a 49 y o  female with a history of asthma, GERD who presents for evaluation of screening colonoscopy  She reports symptoms of chronic GERD for over 20 years  She describes a substernal chest burning discomfort which radiates up to her neck  She denies dysphagia or odynophagia  She can intermittently worsening symptoms at night with regurgitation of acidic products in her    She endorses nausea without vomiting  Her appetite has been good and weight has been stable  Certain foods trigger her reflux, particularly chocolate  She has been taking Pepcid once and sometimes twice daily for many years  However she continues to have breakthrough symptoms of reflux  She reports being on Nexium past but did not take medication regularly  She does not eat 2-3 hours before bed and keeps the head of her bed elevated  She has no prior upper endoscopy    Bowel movements occur daily and regular of normal color and caliber  She denies abdominal pain  She has no prior colonoscopy or modalities for colorectal cancer screening in the past     She reports her mother with a history of diverticulitis otherwise denies family history of gastrointestinal disease including colorectal cancer  REVIEW OF SYSTEMS:    CONSTITUTIONAL: Denies any fever, chills, rigors, and weight loss  HEENT: No earache or tinnitus  Denies hearing loss or visual disturbances  CARDIOVASCULAR: No chest pain or palpitations  RESPIRATORY: Denies any cough, hemoptysis, shortness of breath or dyspnea on exertion  GASTROINTESTINAL: As noted in the History of Present Illness  GENITOURINARY: No problems with urination  Denies any hematuria or dysuria  NEUROLOGIC: No dizziness or vertigo, denies headaches  MUSCULOSKELETAL: Denies any muscle or joint pain  SKIN: Denies skin rashes or itching  ENDOCRINE: Denies excessive thirst  Denies intolerance to heat or cold  PSYCHOSOCIAL: Denies depression or anxiety  Denies any recent memory loss         Historical Information   Past Medical History:   Diagnosis Date    Asthma      Past Surgical History:   Procedure Laterality Date    NO PAST SURGERIES       Social History   Social History     Substance and Sexual Activity   Alcohol Use Yes    Frequency: 2-4 times a month    Drinks per session: 1 or 2     Social History     Substance and Sexual Activity   Drug Use Never Social History     Tobacco Use   Smoking Status Never Smoker   Smokeless Tobacco Never Used     Family History   Problem Relation Age of Onset    No Known Problems Mother     No Known Problems Father     Dementia Maternal Grandmother         Without behavioral disturbance, unspecified dementia type     Heart failure Maternal Grandmother     Hypertension Maternal Grandmother     Diabetes type II Maternal Grandmother     Breast cancer Maternal Aunt 61    No Known Problems Daughter     No Known Problems Maternal Aunt     No Known Problems Maternal Aunt     No Known Problems Maternal Aunt        Meds/Allergies       Current Outpatient Medications:     albuterol (PROVENTIL HFA,VENTOLIN HFA) 90 mcg/act inhaler    famotidine (PEPCID) 20 mg tablet    triamcinolone (KENALOG) 0 025 % ointment    Vitamin D, Ergocalciferol, 2000 units CAPS    No Known Allergies        Objective     Blood pressure 128/80, pulse 78, temperature (!) 97 4 °F (36 3 °C), temperature source Tympanic, height 5' 3" (1 6 m), weight 78 kg (172 lb), not currently breastfeeding  There is no height or weight on file to calculate BMI  PHYSICAL EXAM:      General Appearance:   Well-appearing older female Alert, cooperative, no distress   HEENT:   Normocephalic, atraumatic, anicteric  Neck:  Supple, symmetrical, trachea midline   Lungs:   Clear to auscultation bilaterally; no rales, rhonchi or wheezing; respirations unlabored    Heart[de-identified]   Regular rate and rhythm; no murmur, rub, or gallop  Abdomen:   Soft, non-tender, non-distended; normal bowel sounds; no masses, no organomegaly    Genitalia:   Deferred    Rectal:   Deferred    Extremities:  No cyanosis, clubbing or edema    Pulses:  2+ and symmetric    Skin:  No jaundice, rashes, or lesions    Lymph nodes:  No palpable cervical lymphadenopathy        Lab Results:   No visits with results within 1 Day(s) from this visit     Latest known visit with results is:   Lab on 05/13/2019 Component Date Value    Sodium 05/13/2019 140     Potassium 05/13/2019 3 8     Chloride 05/13/2019 104     CO2 05/13/2019 30     ANION GAP 05/13/2019 6     BUN 05/13/2019 12     Creatinine 05/13/2019 0 86     Glucose, Fasting 05/13/2019 92     Calcium 05/13/2019 9 5     AST 05/13/2019 25     ALT 05/13/2019 14     Alkaline Phosphatase 05/13/2019 95     Total Protein 05/13/2019 8 0     Albumin 05/13/2019 4 6     Total Bilirubin 05/13/2019 0 40     eGFR 05/13/2019 90     Cholesterol 05/13/2019 183     Triglycerides 05/13/2019 169*    HDL, Direct 05/13/2019 45     LDL Calculated 05/13/2019 104     Non-HDL-Chol (CHOL-HDL) 05/13/2019 138     Vit D, 25-Hydroxy 05/13/2019 34 8          Radiology Results:   No results found

## 2020-03-19 ENCOUNTER — TELEPHONE (OUTPATIENT)
Dept: GASTROENTEROLOGY | Facility: MEDICAL CENTER | Age: 55
End: 2020-03-19

## 2020-05-20 ENCOUNTER — TELEPHONE (OUTPATIENT)
Dept: GASTROENTEROLOGY | Facility: AMBULARY SURGERY CENTER | Age: 55
End: 2020-05-20

## 2020-06-08 ENCOUNTER — OFFICE VISIT (OUTPATIENT)
Dept: FAMILY MEDICINE CLINIC | Facility: CLINIC | Age: 55
End: 2020-06-08

## 2020-06-08 ENCOUNTER — PREP FOR PROCEDURE (OUTPATIENT)
Dept: GASTROENTEROLOGY | Facility: MEDICAL CENTER | Age: 55
End: 2020-06-08

## 2020-06-08 ENCOUNTER — LAB (OUTPATIENT)
Dept: LAB | Facility: CLINIC | Age: 55
End: 2020-06-08
Payer: COMMERCIAL

## 2020-06-08 ENCOUNTER — TELEPHONE (OUTPATIENT)
Dept: FAMILY MEDICINE CLINIC | Facility: CLINIC | Age: 55
End: 2020-06-08

## 2020-06-08 VITALS
RESPIRATION RATE: 16 BRPM | SYSTOLIC BLOOD PRESSURE: 140 MMHG | WEIGHT: 171 LBS | BODY MASS INDEX: 29.19 KG/M2 | HEART RATE: 81 BPM | TEMPERATURE: 96.9 F | OXYGEN SATURATION: 98 % | HEIGHT: 64 IN | DIASTOLIC BLOOD PRESSURE: 80 MMHG

## 2020-06-08 DIAGNOSIS — Z86.39 HISTORY OF ELEVATED LIPIDS: ICD-10-CM

## 2020-06-08 DIAGNOSIS — K21.9 GASTROESOPHAGEAL REFLUX DISEASE, ESOPHAGITIS PRESENCE NOT SPECIFIED: ICD-10-CM

## 2020-06-08 DIAGNOSIS — L20.82 FLEXURAL ECZEMA: ICD-10-CM

## 2020-06-08 DIAGNOSIS — J45.20 MILD INTERMITTENT ASTHMA WITHOUT COMPLICATION: ICD-10-CM

## 2020-06-08 DIAGNOSIS — Z12.11 COLON CANCER SCREENING: Primary | ICD-10-CM

## 2020-06-08 DIAGNOSIS — Z00.00 WELLNESS EXAMINATION: Primary | ICD-10-CM

## 2020-06-08 DIAGNOSIS — Z20.822 ENCOUNTER FOR LABORATORY TESTING FOR COVID-19 VIRUS: ICD-10-CM

## 2020-06-08 DIAGNOSIS — E66.3 OVERWEIGHT: ICD-10-CM

## 2020-06-08 DIAGNOSIS — Z23 NEED FOR VACCINATION: ICD-10-CM

## 2020-06-08 DIAGNOSIS — Z12.31 BREAST CANCER SCREENING BY MAMMOGRAM: ICD-10-CM

## 2020-06-08 LAB
ALBUMIN SERPL BCP-MCNC: 4.2 G/DL (ref 3.5–5)
ALP SERPL-CCNC: 98 U/L (ref 46–116)
ALT SERPL W P-5'-P-CCNC: 25 U/L (ref 12–78)
ANION GAP SERPL CALCULATED.3IONS-SCNC: 6 MMOL/L (ref 4–13)
AST SERPL W P-5'-P-CCNC: 18 U/L (ref 5–45)
BILIRUB SERPL-MCNC: 0.29 MG/DL (ref 0.2–1)
BUN SERPL-MCNC: 13 MG/DL (ref 5–25)
CALCIUM SERPL-MCNC: 8.9 MG/DL (ref 8.3–10.1)
CHLORIDE SERPL-SCNC: 109 MMOL/L (ref 100–108)
CHOLEST SERPL-MCNC: 206 MG/DL (ref 50–200)
CO2 SERPL-SCNC: 26 MMOL/L (ref 21–32)
CREAT SERPL-MCNC: 0.83 MG/DL (ref 0.6–1.3)
GFR SERPL CREATININE-BSD FRML MDRD: 93 ML/MIN/1.73SQ M
GLUCOSE P FAST SERPL-MCNC: 110 MG/DL (ref 65–99)
HDLC SERPL-MCNC: 48 MG/DL
LDLC SERPL CALC-MCNC: 123 MG/DL (ref 0–100)
NONHDLC SERPL-MCNC: 158 MG/DL
POTASSIUM SERPL-SCNC: 4.2 MMOL/L (ref 3.5–5.3)
PROT SERPL-MCNC: 8.4 G/DL (ref 6.4–8.2)
SODIUM SERPL-SCNC: 141 MMOL/L (ref 136–145)
TRIGL SERPL-MCNC: 174 MG/DL

## 2020-06-08 PROCEDURE — 36415 COLL VENOUS BLD VENIPUNCTURE: CPT

## 2020-06-08 PROCEDURE — 90715 TDAP VACCINE 7 YRS/> IM: CPT

## 2020-06-08 PROCEDURE — 99396 PREV VISIT EST AGE 40-64: CPT | Performed by: PHYSICIAN ASSISTANT

## 2020-06-08 PROCEDURE — 3008F BODY MASS INDEX DOCD: CPT | Performed by: NURSE PRACTITIONER

## 2020-06-08 PROCEDURE — 80061 LIPID PANEL: CPT

## 2020-06-08 PROCEDURE — 80053 COMPREHEN METABOLIC PANEL: CPT

## 2020-06-08 PROCEDURE — 90471 IMMUNIZATION ADMIN: CPT

## 2020-06-08 RX ORDER — TRIAMCINOLONE ACETONIDE 0.25 MG/G
OINTMENT TOPICAL 2 TIMES DAILY
Qty: 30 G | Refills: 1 | Status: SHIPPED | OUTPATIENT
Start: 2020-06-08 | End: 2021-03-12 | Stop reason: SDUPTHER

## 2020-06-08 RX ORDER — ALBUTEROL SULFATE 90 UG/1
2 AEROSOL, METERED RESPIRATORY (INHALATION) EVERY 6 HOURS PRN
Qty: 1 EACH | Refills: 1 | Status: SHIPPED | OUTPATIENT
Start: 2020-06-08 | End: 2021-04-07 | Stop reason: SDUPTHER

## 2020-06-08 RX ORDER — FAMOTIDINE 20 MG/1
20 TABLET, FILM COATED ORAL 2 TIMES DAILY
Qty: 90 TABLET | Refills: 0 | Status: SHIPPED | OUTPATIENT
Start: 2020-06-08 | End: 2022-04-13 | Stop reason: SDUPTHER

## 2020-06-11 ENCOUNTER — ANESTHESIA EVENT (OUTPATIENT)
Dept: GASTROENTEROLOGY | Facility: MEDICAL CENTER | Age: 55
End: 2020-06-11

## 2020-06-22 ENCOUNTER — TELEPHONE (OUTPATIENT)
Dept: OBGYN CLINIC | Facility: CLINIC | Age: 55
End: 2020-06-22

## 2020-06-23 ENCOUNTER — ANNUAL EXAM (OUTPATIENT)
Dept: OBGYN CLINIC | Facility: CLINIC | Age: 55
End: 2020-06-23

## 2020-06-23 VITALS
HEART RATE: 98 BPM | BODY MASS INDEX: 29.58 KG/M2 | TEMPERATURE: 97.6 F | DIASTOLIC BLOOD PRESSURE: 72 MMHG | WEIGHT: 171 LBS | SYSTOLIC BLOOD PRESSURE: 149 MMHG

## 2020-06-23 DIAGNOSIS — Z01.419 ENCOUNTER FOR GYNECOLOGICAL EXAMINATION WITHOUT ABNORMAL FINDING: Primary | ICD-10-CM

## 2020-06-23 DIAGNOSIS — Z12.4 SCREENING FOR CERVICAL CANCER: ICD-10-CM

## 2020-06-23 DIAGNOSIS — Z12.39 SCREENING FOR BREAST CANCER: ICD-10-CM

## 2020-06-23 PROCEDURE — 99396 PREV VISIT EST AGE 40-64: CPT | Performed by: NURSE PRACTITIONER

## 2020-07-08 ENCOUNTER — HOSPITAL ENCOUNTER (OUTPATIENT)
Dept: MAMMOGRAPHY | Facility: CLINIC | Age: 55
Discharge: HOME | End: 2020-07-08
Payer: COMMERCIAL

## 2020-07-08 VITALS — WEIGHT: 171 LBS | HEIGHT: 63 IN | BODY MASS INDEX: 30.3 KG/M2

## 2020-07-08 DIAGNOSIS — Z12.31 ENCOUNTER FOR SCREENING MAMMOGRAM FOR MALIGNANT NEOPLASM OF BREAST: ICD-10-CM

## 2020-07-08 PROCEDURE — 77063 BREAST TOMOSYNTHESIS BI: CPT

## 2020-07-08 PROCEDURE — 77067 SCR MAMMO BI INCL CAD: CPT

## 2020-07-16 DIAGNOSIS — Z20.822 ENCOUNTER FOR LABORATORY TESTING FOR COVID-19 VIRUS: ICD-10-CM

## 2020-07-16 PROCEDURE — U0003 INFECTIOUS AGENT DETECTION BY NUCLEIC ACID (DNA OR RNA); SEVERE ACUTE RESPIRATORY SYNDROME CORONAVIRUS 2 (SARS-COV-2) (CORONAVIRUS DISEASE [COVID-19]), AMPLIFIED PROBE TECHNIQUE, MAKING USE OF HIGH THROUGHPUT TECHNOLOGIES AS DESCRIBED BY CMS-2020-01-R: HCPCS | Performed by: INTERNAL MEDICINE

## 2020-07-17 LAB
INPATIENT: NORMAL
SARS-COV-2 RNA SPEC QL NAA+PROBE: NOT DETECTED

## 2020-07-22 NOTE — ANESTHESIA PREPROCEDURE EVALUATION
Review of Systems/Medical History  Patient summary reviewed        Cardiovascular  Negative cardio ROS    Pulmonary  Asthma , well controlled/ stable Last rescue: < 1 week ago Asthma type of rescue: PRN inhaler,        GI/Hepatic    GERD poorly controlled, Bowel prep       Negative  ROS        Endo/Other  Negative endo/other ROS      GYN  Negative gynecology ROS          Hematology  Negative hematology ROS      Musculoskeletal  Negative musculoskeletal ROS        Neurology  Negative neurology ROS      Psychology   Negative psychology ROS              Physical Exam    Airway    Mallampati score: I  TM Distance: >3 FB  Neck ROM: full     Dental   No notable dental hx     Cardiovascular  Comment: Negative ROS, Rhythm: regular, Rate: normal, Cardiovascular exam normal    Pulmonary  Pulmonary exam normal Breath sounds clear to auscultation,     Other Findings        Anesthesia Plan  ASA Score- 2     Anesthesia Type- IV sedation with anesthesia with ASA Monitors  Additional Monitors:   Airway Plan:         Plan Factors-    Induction- intravenous  Postoperative Plan-     Informed Consent- Anesthetic plan and risks discussed with patient

## 2020-07-23 ENCOUNTER — HOSPITAL ENCOUNTER (OUTPATIENT)
Dept: GASTROENTEROLOGY | Facility: MEDICAL CENTER | Age: 55
Setting detail: OUTPATIENT SURGERY
Discharge: HOME | End: 2020-07-23
Admitting: INTERNAL MEDICINE
Payer: COMMERCIAL

## 2020-07-23 ENCOUNTER — ANESTHESIA (OUTPATIENT)
Dept: GASTROENTEROLOGY | Facility: MEDICAL CENTER | Age: 55
End: 2020-07-23

## 2020-07-23 VITALS
RESPIRATION RATE: 18 BRPM | DIASTOLIC BLOOD PRESSURE: 66 MMHG | HEART RATE: 79 BPM | TEMPERATURE: 98.3 F | SYSTOLIC BLOOD PRESSURE: 120 MMHG | OXYGEN SATURATION: 98 %

## 2020-07-23 DIAGNOSIS — K21.9 GASTROESOPHAGEAL REFLUX DISEASE, ESOPHAGITIS PRESENCE NOT SPECIFIED: ICD-10-CM

## 2020-07-23 DIAGNOSIS — Z12.11 COLON CANCER SCREENING: ICD-10-CM

## 2020-07-23 PROCEDURE — 88342 IMHCHEM/IMCYTCHM 1ST ANTB: CPT | Performed by: PATHOLOGY

## 2020-07-23 PROCEDURE — 88305 TISSUE EXAM BY PATHOLOGIST: CPT | Performed by: PATHOLOGY

## 2020-07-23 PROCEDURE — 43239 EGD BIOPSY SINGLE/MULTIPLE: CPT | Performed by: INTERNAL MEDICINE

## 2020-07-23 PROCEDURE — G0121 COLON CA SCRN NOT HI RSK IND: HCPCS | Performed by: INTERNAL MEDICINE

## 2020-07-23 RX ORDER — LIDOCAINE HYDROCHLORIDE 20 MG/ML
INJECTION, SOLUTION EPIDURAL; INFILTRATION; INTRACAUDAL; PERINEURAL AS NEEDED
Status: DISCONTINUED | OUTPATIENT
Start: 2020-07-23 | End: 2020-07-23 | Stop reason: SURG

## 2020-07-23 RX ORDER — PROPOFOL 10 MG/ML
INJECTION, EMULSION INTRAVENOUS AS NEEDED
Status: DISCONTINUED | OUTPATIENT
Start: 2020-07-23 | End: 2020-07-23 | Stop reason: SURG

## 2020-07-23 RX ORDER — SODIUM CHLORIDE 9 MG/ML
125 INJECTION, SOLUTION INTRAVENOUS CONTINUOUS
Status: DISCONTINUED | OUTPATIENT
Start: 2020-07-23 | End: 2020-07-23

## 2020-07-23 RX ADMIN — LIDOCAINE HYDROCHLORIDE 50 MG: 20 INJECTION, SOLUTION EPIDURAL; INFILTRATION; INTRACAUDAL; PERINEURAL at 09:58

## 2020-07-23 RX ADMIN — Medication 40 MG: at 09:57

## 2020-07-23 RX ADMIN — SODIUM CHLORIDE 125 ML/HR: 0.9 INJECTION, SOLUTION INTRAVENOUS at 09:17

## 2020-07-23 RX ADMIN — LIDOCAINE HYDROCHLORIDE 50 MG: 20 INJECTION, SOLUTION EPIDURAL; INFILTRATION; INTRACAUDAL; PERINEURAL at 09:52

## 2020-07-23 RX ADMIN — PROPOFOL 100 MG: 10 INJECTION, EMULSION INTRAVENOUS at 09:42

## 2020-07-23 RX ADMIN — PROPOFOL 50 MG: 10 INJECTION, EMULSION INTRAVENOUS at 09:54

## 2020-07-23 RX ADMIN — PROPOFOL 50 MG: 10 INJECTION, EMULSION INTRAVENOUS at 09:50

## 2020-07-23 RX ADMIN — PROPOFOL 100 MG: 10 INJECTION, EMULSION INTRAVENOUS at 09:47

## 2020-07-23 RX ADMIN — PROPOFOL 50 MG: 10 INJECTION, EMULSION INTRAVENOUS at 09:59

## 2020-07-23 RX ADMIN — PROPOFOL 50 MG: 10 INJECTION, EMULSION INTRAVENOUS at 10:01

## 2020-07-23 RX ADMIN — LIDOCAINE HYDROCHLORIDE 100 MG: 20 INJECTION, SOLUTION EPIDURAL; INFILTRATION; INTRACAUDAL; PERINEURAL at 09:42

## 2020-07-23 NOTE — H&P
H&P EXAM - Outpatient Endoscopy   [facility] 48 y o  female MRN: [id number]    [facility] [facility]   Encounter: [id number]      History and Physical -  [facility]  [facility] 48 y o  female MRN: [id number]                  HPI: [facility] is a 48y o  year old female who presents for egd/colonoscopy      REVIEW OF SYSTEMS: Per the HPI, and otherwise unremarkable  Historical Information   Past Medical History:   Diagnosis Date    Asthma     GERD (gastroesophageal reflux disease)      Past Surgical History:   Procedure Laterality Date    NO PAST SURGERIES       Social History   Social History     Substance and Sexual Activity   Alcohol Use Yes    Frequency: 2-4 times a month    Drinks per session: 1 or 2    Binge frequency: Never     Social History     Substance and Sexual Activity   Drug Use Never     Social History     Tobacco Use   Smoking Status Never Smoker   Smokeless Tobacco Never Used     Family History   Problem Relation Age of Onset    No Known Problems Mother     No Known Problems Father     Dementia Maternal Grandmother         Without behavioral disturbance, unspecified dementia type     Heart failure Maternal Grandmother     Hypertension Maternal Grandmother     Diabetes type II Maternal Grandmother     Breast cancer Maternal Aunt         62    No Known Problems Daughter     No Known Problems Paternal Grandfather     Breast cancer Maternal Aunt         62s    No Known Problems Maternal Aunt        Meds/Allergies       (Not in a hospital admission)    No Known Allergies    Objective     /74   Pulse 91   Temp 98 3 °F (36 8 °C) (Temporal)   Resp 16   SpO2 99%       PHYSICAL EXAM    Gen: NAD  CV: RRR  CHEST: Clear  ABD: soft, NT/ND  EXT: no edema      ASSESSMENT/PLAN:  This is a 48y o  year old female here for egd/colonoscopy, and she is stable and optimized for her procedure

## 2020-07-24 ENCOUNTER — TELEPHONE (OUTPATIENT)
Dept: GASTROENTEROLOGY | Facility: MEDICAL CENTER | Age: 55
End: 2020-07-24

## 2020-07-24 NOTE — TELEPHONE ENCOUNTER
Pt called stating during her post op call she was asked how she was feeling and told the nurse she feels like something small is stuck in her throat and her jaw hurts  She was told to call tomorrow if it is still happening, pt would like a call today about this since the office is closed tomorrow         [phone] ( daughters phone) call this # first

## 2020-07-27 NOTE — TELEPHONE ENCOUNTER
I called Maria G Galan  Her jaw pain has resolved  She still has some minor irritation in her throat when she swallows, but this is also improving  I explained it is normal to have some mild throat discomfort following EGD from passage of the scope  I would expect this to continue to improve over the next few days  She expressed understanding and all questions were answered

## 2020-07-29 NOTE — RESULT ENCOUNTER NOTE
My medical assistant will call patient with results  Her stomach biopsies are benign     She will be due for repeat colonoscopy in 10 years

## 2021-03-12 ENCOUNTER — TELEPHONE (OUTPATIENT)
Dept: FAMILY MEDICINE CLINIC | Facility: CLINIC | Age: 56
End: 2021-03-12

## 2021-03-12 DIAGNOSIS — K21.9 GASTROESOPHAGEAL REFLUX DISEASE: ICD-10-CM

## 2021-03-12 DIAGNOSIS — L20.82 FLEXURAL ECZEMA: ICD-10-CM

## 2021-03-12 RX ORDER — OMEPRAZOLE 20 MG/1
CAPSULE, DELAYED RELEASE ORAL
Qty: 90 CAPSULE | Refills: 3 | OUTPATIENT
Start: 2021-03-12

## 2021-03-12 RX ORDER — OMEPRAZOLE 20 MG/1
20 CAPSULE, DELAYED RELEASE ORAL DAILY
Qty: 90 CAPSULE | Refills: 3 | Status: SHIPPED | OUTPATIENT
Start: 2021-03-12 | End: 2022-04-13

## 2021-03-12 RX ORDER — TRIAMCINOLONE ACETONIDE 0.25 MG/G
OINTMENT TOPICAL 2 TIMES DAILY
Qty: 30 G | Refills: 1 | Status: SHIPPED | OUTPATIENT
Start: 2021-03-12 | End: 2021-07-07

## 2021-03-12 NOTE — TELEPHONE ENCOUNTER
Pt calling requesting refills   triamcinolone (KENALOG) 0 025 % ointment     Omeprazole 20 mg    Correct pharmacy on file

## 2021-04-07 DIAGNOSIS — J45.20 MILD INTERMITTENT ASTHMA WITHOUT COMPLICATION: ICD-10-CM

## 2021-04-07 RX ORDER — ALBUTEROL SULFATE 90 UG/1
2 AEROSOL, METERED RESPIRATORY (INHALATION) EVERY 6 HOURS PRN
Qty: 1 EACH | Refills: 1 | Status: SHIPPED | OUTPATIENT
Start: 2021-04-07 | End: 2021-12-01 | Stop reason: SDUPTHER

## 2021-06-24 ENCOUNTER — ANNUAL EXAM (OUTPATIENT)
Dept: OBGYN CLINIC | Facility: CLINIC | Age: 56
End: 2021-06-24

## 2021-06-24 VITALS
HEIGHT: 63 IN | DIASTOLIC BLOOD PRESSURE: 84 MMHG | HEART RATE: 84 BPM | WEIGHT: 172 LBS | BODY MASS INDEX: 30.48 KG/M2 | SYSTOLIC BLOOD PRESSURE: 132 MMHG

## 2021-06-24 DIAGNOSIS — Z01.419 ENCOUNTER FOR GYNECOLOGICAL EXAMINATION WITHOUT ABNORMAL FINDING: Primary | ICD-10-CM

## 2021-06-24 DIAGNOSIS — Z11.3 SCREEN FOR STD (SEXUALLY TRANSMITTED DISEASE): ICD-10-CM

## 2021-06-24 DIAGNOSIS — Z12.4 SCREENING FOR CERVICAL CANCER: ICD-10-CM

## 2021-06-24 DIAGNOSIS — Z12.31 ENCOUNTER FOR SCREENING MAMMOGRAM FOR MALIGNANT NEOPLASM OF BREAST: ICD-10-CM

## 2021-06-24 PROCEDURE — 99396 PREV VISIT EST AGE 40-64: CPT | Performed by: NURSE PRACTITIONER

## 2021-06-24 PROCEDURE — 87491 CHLMYD TRACH DNA AMP PROBE: CPT | Performed by: NURSE PRACTITIONER

## 2021-06-24 PROCEDURE — 87591 N.GONORRHOEAE DNA AMP PROB: CPT | Performed by: NURSE PRACTITIONER

## 2021-06-24 NOTE — PROGRESS NOTES
Rolf Mcclain is a 55 y o  female who presents today for annual GYN exam   Her last pap smear was performed 2019 and result was NILM with negative HPV  She reports no history of abnormal pap smears in her past  Her last mammogram was performed 2020 and result was WNL  She had colonoscopy performed 2020  She reports menses as absent since   Her general medical history has been reviewed and she reports it as follows:    Past Medical History:   Diagnosis Date    Asthma     GERD (gastroesophageal reflux disease)      Past Surgical History:   Procedure Laterality Date    NO PAST SURGERIES       OB History        3    Para   1    Term   1       0    AB   2    Living   1       SAB   0    TAB   2    Ectopic   0    Multiple   0    Live Births   1               Social History     Tobacco Use    Smoking status: Never Smoker    Smokeless tobacco: Never Used   Vaping Use    Vaping Use: Never used   Substance Use Topics    Alcohol use: Yes    Drug use: Never     Cancer-related family history includes Breast cancer in her maternal aunt and maternal aunt  There is no history of Colon cancer or Ovarian cancer  Current Outpatient Medications   Medication Instructions    albuterol (PROVENTIL HFA,VENTOLIN HFA) 90 mcg/act inhaler 2 puffs, Inhalation, Every 6 hours PRN    famotidine (PEPCID) 20 mg, Oral, 2 times daily    omeprazole (PRILOSEC) 20 mg, Oral, Daily    triamcinolone (KENALOG) 0 025 % ointment Topical, 2 times daily    Vitamin D, Ergocalciferol, 2000 units CAPS ergocalciferol (vitamin D2) 50,000 unit capsule       Review of Systems:  Review of Systems   Constitutional: Negative  Gastrointestinal: Negative  Genitourinary: Negative for difficulty urinating, menstrual problem, pelvic pain and vaginal discharge  Skin: Negative          Physical Exam:  /84   Pulse 84   Ht 5' 3" (1 6 m)   Wt 78 kg (172 lb)   BMI 30 47 kg/m²   Physical Exam  Constitutional:       General: She is not in acute distress  Appearance: She is well-developed  Genitourinary:      Vulva, vagina and uterus normal       No lesions in the vagina  No cervical motion tenderness or lesion  Uterus is not tender  No right or left adnexal mass present  Right adnexa not tender  Left adnexa not tender  Neck:      Thyroid: No thyromegaly  Cardiovascular:      Rate and Rhythm: Normal rate and regular rhythm  Pulmonary:      Effort: Pulmonary effort is normal    Chest:      Breasts:         Right: No mass, nipple discharge, skin change or tenderness  Left: No mass, nipple discharge, skin change or tenderness  Abdominal:      Palpations: Abdomen is soft  Tenderness: There is no abdominal tenderness  Musculoskeletal:      Cervical back: Neck supple  Neurological:      Mental Status: She is alert and oriented to person, place, and time  Skin:     General: Skin is warm and dry  Vitals reviewed  Assessment/Plan:   1  Normal well-woman GYN exam   2  Cervical cancer screening:  Normal cervical exam   Pap smear not indicated at this time  3  STD screening:  Orders placed for vaginal GC/CT cultures  Declines screening with serum anti-HIV, anti-HCV, HbsAg, RPR    4  Breast cancer screening:  Normal breast exam   Order placed for bilateral screening mammogram   Reviewed breast self-awareness  5  Colon cancer screening:  Up to date  6  Depression Screening: Patient's depression screening was assessed with a PHQ-2 score of 0  Their PHQ-9 score was 0  Clinically patient does not have depression  No treatment is required  7  BMI Counseling: Body mass index is 30 47 kg/m²  Discussed the patient's BMI with her  The BMI is above normal  Patient referred to PCP due to patient being obese     8  Return to office in 1 year for annual GYN exam

## 2021-06-24 NOTE — LETTER
2021    To [facility]  : 1965      This letter is to advise you that your recent CULTURE results were reviewed by me and are NORMAL  Please contact the office for an appointment if you have any additional concerns      DEANDRE Santizo

## 2021-06-25 LAB
C TRACH DNA SPEC QL NAA+PROBE: NEGATIVE
N GONORRHOEA DNA SPEC QL NAA+PROBE: NEGATIVE

## 2021-07-07 ENCOUNTER — LAB (OUTPATIENT)
Dept: LAB | Facility: CLINIC | Age: 56
End: 2021-07-07
Payer: COMMERCIAL

## 2021-07-07 ENCOUNTER — OFFICE VISIT (OUTPATIENT)
Dept: FAMILY MEDICINE CLINIC | Facility: CLINIC | Age: 56
End: 2021-07-07

## 2021-07-07 VITALS
BODY MASS INDEX: 30.81 KG/M2 | SYSTOLIC BLOOD PRESSURE: 128 MMHG | OXYGEN SATURATION: 98 % | WEIGHT: 173.9 LBS | DIASTOLIC BLOOD PRESSURE: 88 MMHG | HEART RATE: 91 BPM | RESPIRATION RATE: 18 BRPM | TEMPERATURE: 96.8 F | HEIGHT: 63 IN

## 2021-07-07 DIAGNOSIS — D23.9 DERMATOFIBROMA: ICD-10-CM

## 2021-07-07 DIAGNOSIS — Z00.00 WELL ADULT EXAM: Primary | ICD-10-CM

## 2021-07-07 DIAGNOSIS — Z11.4 SCREENING FOR HIV (HUMAN IMMUNODEFICIENCY VIRUS): ICD-10-CM

## 2021-07-07 DIAGNOSIS — Z11.59 NEED FOR HEPATITIS C SCREENING TEST: ICD-10-CM

## 2021-07-07 DIAGNOSIS — E66.3 OVERWEIGHT: ICD-10-CM

## 2021-07-07 LAB
ALBUMIN SERPL BCP-MCNC: 4.1 G/DL (ref 3.5–5)
ALP SERPL-CCNC: 120 U/L (ref 46–116)
ALT SERPL W P-5'-P-CCNC: 28 U/L (ref 12–78)
ANION GAP SERPL CALCULATED.3IONS-SCNC: 4 MMOL/L (ref 4–13)
AST SERPL W P-5'-P-CCNC: 24 U/L (ref 5–45)
BILIRUB SERPL-MCNC: 0.28 MG/DL (ref 0.2–1)
BUN SERPL-MCNC: 15 MG/DL (ref 5–25)
CALCIUM SERPL-MCNC: 9.4 MG/DL (ref 8.3–10.1)
CHLORIDE SERPL-SCNC: 106 MMOL/L (ref 100–108)
CHOLEST SERPL-MCNC: 231 MG/DL (ref 50–200)
CO2 SERPL-SCNC: 27 MMOL/L (ref 21–32)
CREAT SERPL-MCNC: 0.91 MG/DL (ref 0.6–1.3)
GFR SERPL CREATININE-BSD FRML MDRD: 82 ML/MIN/1.73SQ M
GLUCOSE P FAST SERPL-MCNC: 110 MG/DL (ref 65–99)
HCV AB SER QL: NORMAL
HDLC SERPL-MCNC: 47 MG/DL
LDLC SERPL CALC-MCNC: 147 MG/DL (ref 0–100)
NONHDLC SERPL-MCNC: 184 MG/DL
POTASSIUM SERPL-SCNC: 3.9 MMOL/L (ref 3.5–5.3)
PROT SERPL-MCNC: 8.7 G/DL (ref 6.4–8.2)
SODIUM SERPL-SCNC: 137 MMOL/L (ref 136–145)
TRIGL SERPL-MCNC: 184 MG/DL

## 2021-07-07 PROCEDURE — 80053 COMPREHEN METABOLIC PANEL: CPT

## 2021-07-07 PROCEDURE — 1036F TOBACCO NON-USER: CPT | Performed by: PHYSICIAN ASSISTANT

## 2021-07-07 PROCEDURE — 86803 HEPATITIS C AB TEST: CPT

## 2021-07-07 PROCEDURE — 99396 PREV VISIT EST AGE 40-64: CPT | Performed by: PHYSICIAN ASSISTANT

## 2021-07-07 PROCEDURE — 3008F BODY MASS INDEX DOCD: CPT | Performed by: PHYSICIAN ASSISTANT

## 2021-07-07 PROCEDURE — 36415 COLL VENOUS BLD VENIPUNCTURE: CPT

## 2021-07-07 PROCEDURE — 3725F SCREEN DEPRESSION PERFORMED: CPT | Performed by: PHYSICIAN ASSISTANT

## 2021-07-07 PROCEDURE — 87389 HIV-1 AG W/HIV-1&-2 AB AG IA: CPT

## 2021-07-07 PROCEDURE — 80061 LIPID PANEL: CPT

## 2021-07-07 NOTE — PATIENT INSTRUCTIONS

## 2021-07-07 NOTE — PROGRESS NOTES
Assessment/Plan:    Overweight  BMI Counseling: Body mass index is 30 81 kg/m²  The BMI is above normal  Exercise recommendations include vigorous aerobic physical activity for 75 minutes/week  discussed increasing intensity           Problem List Items Addressed This Visit        Other    Overweight     BMI Counseling: Body mass index is 30 81 kg/m²  The BMI is above normal  Exercise recommendations include vigorous aerobic physical activity for 75 minutes/week  discussed increasing intensity             Other Visit Diagnoses     Well adult exam    -  Primary    Need for hepatitis C screening test        Relevant Orders    Hepatitis C Antibody ([facility]) (Completed)    Screening for HIV (human immunodeficiency virus)        Relevant Orders    HIV 1/2 Antigen/Antibody (4th Generation) w Reflex [facility]    BMI 30 0-30 9,adult        Relevant Orders    Comprehensive metabolic panel (Completed)    Lipid panel (Completed)    Dermatofibroma                Subjective:      Patient ID: Jada Mckinnon is a 55 y o  female  HPI 55year old F here for yearly physcial/  She has bump on left shoulder  It has been there some time  Asthma has been well controlled  Diet: changed diet, not eating late at night  Exercise:regular  Sleep: no concerns              Immunizations:declined covid vaccine currently  Dental:scheduled for august  Vision:glasses, less than 1 year  Hearing:no concerns  Output:no concerns, utd with colonoscopy  HM: Scheduled for mammogram, utd with pap and colonscopy  The following portions of the patient's history were reviewed and updated as appropriate: She  has a past medical history of Asthma and GERD (gastroesophageal reflux disease)    She   Patient Active Problem List    Diagnosis Date Noted    GERD (gastroesophageal reflux disease) 02/13/2020    Neck pain 05/13/2019    Carpal tunnel syndrome of right wrist 09/07/2017    Heart burn 09/07/2017    Asthma 08/01/2017    Flexural eczema 08/01/2017    Overweight 08/01/2017    Vitamin D deficiency 08/01/2017     She  has a past surgical history that includes No past surgeries  Her family history includes Breast cancer in her maternal aunt and maternal aunt; Dementia in her maternal grandmother; Diabetes type II in her maternal grandmother; Heart failure in her maternal grandmother; Hypertension in her maternal grandmother; No Known Problems in her daughter, father, maternal aunt, mother, and paternal grandfather  She  reports that she has never smoked  She has never used smokeless tobacco  She reports current alcohol use  She reports that she does not use drugs  Current Outpatient Medications   Medication Sig Dispense Refill    albuterol (PROVENTIL HFA,VENTOLIN HFA) 90 mcg/act inhaler Inhale 2 puffs every 6 (six) hours as needed for wheezing 1 each 1    omeprazole (PriLOSEC) 20 mg delayed release capsule Take 1 capsule (20 mg total) by mouth daily 90 capsule 3    Vitamin D, Ergocalciferol, 2000 units CAPS ergocalciferol (vitamin D2) 50,000 unit capsule      famotidine (PEPCID) 20 mg tablet Take 1 tablet (20 mg total) by mouth 2 (two) times a day (Patient not taking: Reported on 6/24/2021) 90 tablet 0     No current facility-administered medications for this visit  Current Outpatient Medications on File Prior to Visit   Medication Sig    albuterol (PROVENTIL HFA,VENTOLIN HFA) 90 mcg/act inhaler Inhale 2 puffs every 6 (six) hours as needed for wheezing    omeprazole (PriLOSEC) 20 mg delayed release capsule Take 1 capsule (20 mg total) by mouth daily    Vitamin D, Ergocalciferol, 2000 units CAPS ergocalciferol (vitamin D2) 50,000 unit capsule    famotidine (PEPCID) 20 mg tablet Take 1 tablet (20 mg total) by mouth 2 (two) times a day (Patient not taking: Reported on 6/24/2021)     No current facility-administered medications on file prior to visit  She has No Known Allergies       Review of Systems   Constitutional: Negative for activity change, appetite change, chills, fatigue and unexpected weight change  HENT: Negative for dental problem, ear pain, hearing loss and sore throat  Eyes: Negative for visual disturbance  Respiratory: Negative for cough and wheezing  Cardiovascular: Negative for chest pain  Gastrointestinal: Negative for abdominal pain, constipation, diarrhea and vomiting  Genitourinary: Negative for difficulty urinating and dysuria  Musculoskeletal: Negative for arthralgias, back pain and myalgias  Skin: Negative for rash  Neurological: Negative for dizziness and headaches  Psychiatric/Behavioral: Negative for behavioral problems  Objective:      /88 (BP Location: Left arm, Patient Position: Sitting, Cuff Size: Standard)   Pulse 91   Temp (!) 96 8 °F (36 °C) (Temporal)   Resp 18   Ht 5' 3" (1 6 m)   Wt 78 9 kg (173 lb 14 4 oz)   SpO2 98%   BMI 30 81 kg/m²          Physical Exam  Vitals and nursing note reviewed  Constitutional:       General: She is not in acute distress  Appearance: She is well-developed  HENT:      Head: Normocephalic and atraumatic  Right Ear: External ear normal       Left Ear: External ear normal    Eyes:      Conjunctiva/sclera: Conjunctivae normal    Neck:      Thyroid: No thyromegaly  Cardiovascular:      Rate and Rhythm: Normal rate and regular rhythm  Heart sounds: Normal heart sounds  No murmur heard  Pulmonary:      Effort: Pulmonary effort is normal  No respiratory distress  Breath sounds: Normal breath sounds  No wheezing  Abdominal:      General: Abdomen is flat  Palpations: There is no mass  Tenderness: There is no abdominal tenderness  Hernia: No hernia is present  Musculoskeletal:      Cervical back: Normal range of motion and neck supple  Lymphadenopathy:      Cervical: No cervical adenopathy  Skin:     Findings: Lesion present  Bruising: flesh colored nodule left shoulder 1 cm  Neurological:      Mental Status: She is alert and oriented to person, place, and time     Psychiatric:         Mood and Affect: Mood normal          Behavior: Behavior normal

## 2021-07-08 LAB — HIV 1+2 AB+HIV1 P24 AG SERPL QL IA: NORMAL

## 2021-07-08 NOTE — ASSESSMENT & PLAN NOTE
BMI Counseling: Body mass index is 30 81 kg/m²  The BMI is above normal  Exercise recommendations include vigorous aerobic physical activity for 75 minutes/week   discussed increasing intensity

## 2021-07-09 NOTE — RESULT ENCOUNTER NOTE
Blood sugar was 10 points over normal and cholesterol is 40 points higher than it should be  Recommend increasing exercise intensity   She should decrease processed foods and try to cut back on carbs

## 2021-09-15 ENCOUNTER — HOSPITAL ENCOUNTER (OUTPATIENT)
Dept: MAMMOGRAPHY | Facility: CLINIC | Age: 56
Discharge: HOME | End: 2021-09-15
Payer: COMMERCIAL

## 2021-09-15 DIAGNOSIS — Z12.31 ENCOUNTER FOR SCREENING MAMMOGRAM FOR MALIGNANT NEOPLASM OF BREAST: ICD-10-CM

## 2021-09-15 PROCEDURE — 77063 BREAST TOMOSYNTHESIS BI: CPT

## 2021-09-15 PROCEDURE — 77067 SCR MAMMO BI INCL CAD: CPT

## 2021-11-19 ENCOUNTER — OFFICE VISIT (OUTPATIENT)
Dept: FAMILY MEDICINE CLINIC | Facility: CLINIC | Age: 56
End: 2021-11-19

## 2021-11-19 DIAGNOSIS — Z20.822 SUSPECTED COVID-19 VIRUS INFECTION: Primary | ICD-10-CM

## 2021-11-19 PROCEDURE — U0005 INFEC AGEN DETEC AMPLI PROBE: HCPCS | Performed by: FAMILY MEDICINE

## 2021-11-19 PROCEDURE — U0003 INFECTIOUS AGENT DETECTION BY NUCLEIC ACID (DNA OR RNA); SEVERE ACUTE RESPIRATORY SYNDROME CORONAVIRUS 2 (SARS-COV-2) (CORONAVIRUS DISEASE [COVID-19]), AMPLIFIED PROBE TECHNIQUE, MAKING USE OF HIGH THROUGHPUT TECHNOLOGIES AS DESCRIBED BY CMS-2020-01-R: HCPCS | Performed by: FAMILY MEDICINE

## 2021-11-19 PROCEDURE — 99441 PR PHYS/QHP TELEPHONE EVALUATION 5-10 MIN: CPT | Performed by: FAMILY MEDICINE

## 2021-11-22 ENCOUNTER — TELEPHONE (OUTPATIENT)
Dept: FAMILY MEDICINE CLINIC | Facility: CLINIC | Age: 56
End: 2021-11-22

## 2021-11-23 ENCOUNTER — TELEPHONE (OUTPATIENT)
Dept: FAMILY MEDICINE CLINIC | Facility: CLINIC | Age: 56
End: 2021-11-23

## 2021-11-23 DIAGNOSIS — Z20.822 EXPOSURE TO COVID-19 VIRUS: ICD-10-CM

## 2021-11-23 DIAGNOSIS — B34.9 VIRAL INFECTION, UNSPECIFIED: ICD-10-CM

## 2021-11-26 PROCEDURE — U0003 INFECTIOUS AGENT DETECTION BY NUCLEIC ACID (DNA OR RNA); SEVERE ACUTE RESPIRATORY SYNDROME CORONAVIRUS 2 (SARS-COV-2) (CORONAVIRUS DISEASE [COVID-19]), AMPLIFIED PROBE TECHNIQUE, MAKING USE OF HIGH THROUGHPUT TECHNOLOGIES AS DESCRIBED BY CMS-2020-01-R: HCPCS | Performed by: PHYSICIAN ASSISTANT

## 2021-11-26 PROCEDURE — U0005 INFEC AGEN DETEC AMPLI PROBE: HCPCS | Performed by: PHYSICIAN ASSISTANT

## 2021-11-29 ENCOUNTER — APPOINTMENT (OUTPATIENT)
Dept: LAB | Facility: CLINIC | Age: 56
End: 2021-11-29
Payer: COMMERCIAL

## 2021-11-29 ENCOUNTER — OFFICE VISIT (OUTPATIENT)
Dept: FAMILY MEDICINE CLINIC | Facility: CLINIC | Age: 56
End: 2021-11-29

## 2021-11-29 ENCOUNTER — HOSPITAL ENCOUNTER (OUTPATIENT)
Dept: RADIOLOGY | Facility: HOSPITAL | Age: 56
Discharge: HOME | End: 2021-11-29
Payer: COMMERCIAL

## 2021-11-29 VITALS
TEMPERATURE: 98 F | RESPIRATION RATE: 16 BRPM | HEART RATE: 100 BPM | BODY MASS INDEX: 29.95 KG/M2 | DIASTOLIC BLOOD PRESSURE: 86 MMHG | HEIGHT: 63 IN | WEIGHT: 169 LBS | OXYGEN SATURATION: 98 % | SYSTOLIC BLOOD PRESSURE: 112 MMHG

## 2021-11-29 DIAGNOSIS — J98.9 RESPIRATORY ILLNESS: Primary | ICD-10-CM

## 2021-11-29 DIAGNOSIS — J98.9 RESPIRATORY ILLNESS: ICD-10-CM

## 2021-11-29 DIAGNOSIS — J40 BRONCHITIS: ICD-10-CM

## 2021-11-29 LAB — D DIMER PPP FEU-MCNC: 0.32 UG/ML FEU

## 2021-11-29 PROCEDURE — 36415 COLL VENOUS BLD VENIPUNCTURE: CPT

## 2021-11-29 PROCEDURE — 99213 OFFICE O/P EST LOW 20 MIN: CPT | Performed by: FAMILY MEDICINE

## 2021-11-29 PROCEDURE — 85379 FIBRIN DEGRADATION QUANT: CPT

## 2021-11-29 PROCEDURE — 71046 X-RAY EXAM CHEST 2 VIEWS: CPT

## 2021-11-29 RX ORDER — AMOXICILLIN 500 MG/1
500 CAPSULE ORAL EVERY 12 HOURS SCHEDULED
Qty: 20 CAPSULE | Refills: 0 | Status: SHIPPED | OUTPATIENT
Start: 2021-11-29 | End: 2021-12-09

## 2021-11-30 ENCOUNTER — TELEPHONE (OUTPATIENT)
Dept: FAMILY MEDICINE CLINIC | Facility: CLINIC | Age: 56
End: 2021-11-30

## 2021-12-01 ENCOUNTER — TELEMEDICINE (OUTPATIENT)
Dept: FAMILY MEDICINE CLINIC | Facility: CLINIC | Age: 56
End: 2021-12-01

## 2021-12-01 DIAGNOSIS — J06.9 VIRAL UPPER RESPIRATORY TRACT INFECTION: Primary | ICD-10-CM

## 2021-12-01 DIAGNOSIS — J98.9 RESPIRATORY ILLNESS: ICD-10-CM

## 2021-12-01 DIAGNOSIS — J45.20 MILD INTERMITTENT ASTHMA WITHOUT COMPLICATION: ICD-10-CM

## 2021-12-01 PROCEDURE — 99213 OFFICE O/P EST LOW 20 MIN: CPT | Performed by: FAMILY MEDICINE

## 2021-12-01 RX ORDER — IBUPROFEN 600 MG/1
600 TABLET ORAL EVERY 6 HOURS PRN
Qty: 30 TABLET | Refills: 0 | Status: SHIPPED | OUTPATIENT
Start: 2021-12-01 | End: 2022-03-18

## 2021-12-01 RX ORDER — ALBUTEROL SULFATE 90 UG/1
2 AEROSOL, METERED RESPIRATORY (INHALATION) EVERY 6 HOURS PRN
Qty: 18 G | Refills: 0 | Status: SHIPPED | OUTPATIENT
Start: 2021-12-01 | End: 2022-03-18

## 2021-12-01 RX ORDER — ACETAMINOPHEN 500 MG
1000 TABLET ORAL EVERY 8 HOURS
Qty: 30 TABLET | Refills: 1 | Status: SHIPPED | OUTPATIENT
Start: 2021-12-01 | End: 2022-03-18

## 2021-12-06 ENCOUNTER — TELEMEDICINE (OUTPATIENT)
Dept: FAMILY MEDICINE CLINIC | Facility: CLINIC | Age: 56
End: 2021-12-06

## 2021-12-06 ENCOUNTER — TELEPHONE (OUTPATIENT)
Dept: FAMILY MEDICINE CLINIC | Facility: CLINIC | Age: 56
End: 2021-12-06

## 2021-12-06 DIAGNOSIS — Z20.822 SUSPECTED COVID-19 VIRUS INFECTION: Primary | ICD-10-CM

## 2021-12-06 PROCEDURE — 99442 PR PHYS/QHP TELEPHONE EVALUATION 11-20 MIN: CPT | Performed by: FAMILY MEDICINE

## 2021-12-06 RX ORDER — ASCORBATE CALCIUM 500 MG
500 TABLET ORAL DAILY
Qty: 30 TABLET | Refills: 0 | Status: SHIPPED | OUTPATIENT
Start: 2021-12-06 | End: 2022-03-18

## 2021-12-06 RX ORDER — PROMETHAZINE HYDROCHLORIDE AND CODEINE PHOSPHATE 6.25; 1 MG/5ML; MG/5ML
5 SYRUP ORAL EVERY 6 HOURS PRN
Qty: 240 ML | Refills: 0 | Status: SHIPPED | OUTPATIENT
Start: 2021-12-06 | End: 2021-12-13 | Stop reason: ALTCHOICE

## 2021-12-07 ENCOUNTER — TELEPHONE (OUTPATIENT)
Dept: FAMILY MEDICINE CLINIC | Facility: CLINIC | Age: 56
End: 2021-12-07

## 2021-12-07 PROCEDURE — 0241U HB NFCT DS VIR RESP RNA 4 TRGT: CPT | Performed by: FAMILY MEDICINE

## 2021-12-08 ENCOUNTER — TELEPHONE (OUTPATIENT)
Dept: FAMILY MEDICINE CLINIC | Facility: CLINIC | Age: 56
End: 2021-12-08

## 2021-12-08 LAB
FLUAV RNA RESP QL NAA+PROBE: NEGATIVE
FLUBV RNA RESP QL NAA+PROBE: NEGATIVE
RSV RNA RESP QL NAA+PROBE: NEGATIVE
SARS-COV-2 RNA RESP QL NAA+PROBE: POSITIVE

## 2021-12-13 ENCOUNTER — APPOINTMENT (EMERGENCY)
Dept: RADIOLOGY | Facility: HOSPITAL | Age: 56
End: 2021-12-13
Payer: COMMERCIAL

## 2021-12-13 ENCOUNTER — HOSPITAL ENCOUNTER (EMERGENCY)
Facility: HOSPITAL | Age: 56
Discharge: HOME | End: 2021-12-13
Attending: EMERGENCY MEDICINE
Payer: COMMERCIAL

## 2021-12-13 VITALS
TEMPERATURE: 99 F | RESPIRATION RATE: 16 BRPM | OXYGEN SATURATION: 98 % | WEIGHT: 164.46 LBS | DIASTOLIC BLOOD PRESSURE: 72 MMHG | HEART RATE: 90 BPM | BODY MASS INDEX: 29.13 KG/M2 | SYSTOLIC BLOOD PRESSURE: 145 MMHG

## 2021-12-13 DIAGNOSIS — U07.1 COVID-19: Primary | ICD-10-CM

## 2021-12-13 PROCEDURE — 99284 EMERGENCY DEPT VISIT MOD MDM: CPT | Performed by: EMERGENCY MEDICINE

## 2021-12-13 PROCEDURE — 99283 EMERGENCY DEPT VISIT LOW MDM: CPT

## 2021-12-13 PROCEDURE — 71045 X-RAY EXAM CHEST 1 VIEW: CPT

## 2021-12-13 RX ORDER — LOPERAMIDE HYDROCHLORIDE 2 MG/1
2 CAPSULE ORAL 4 TIMES DAILY PRN
Qty: 12 CAPSULE | Refills: 0 | Status: SHIPPED | OUTPATIENT
Start: 2021-12-13 | End: 2022-03-18

## 2021-12-13 RX ORDER — ONDANSETRON 4 MG/1
4 TABLET, ORALLY DISINTEGRATING ORAL EVERY 8 HOURS PRN
Qty: 10 TABLET | Refills: 0 | Status: SHIPPED | OUTPATIENT
Start: 2021-12-13 | End: 2022-03-18

## 2021-12-16 ENCOUNTER — TELEPHONE (OUTPATIENT)
Dept: FAMILY MEDICINE CLINIC | Facility: CLINIC | Age: 56
End: 2021-12-16

## 2021-12-20 ENCOUNTER — OFFICE VISIT (OUTPATIENT)
Dept: FAMILY MEDICINE CLINIC | Facility: CLINIC | Age: 56
End: 2021-12-20

## 2021-12-20 VITALS
HEART RATE: 93 BPM | TEMPERATURE: 98 F | SYSTOLIC BLOOD PRESSURE: 158 MMHG | OXYGEN SATURATION: 98 % | HEIGHT: 63 IN | RESPIRATION RATE: 18 BRPM | WEIGHT: 166.4 LBS | BODY MASS INDEX: 29.48 KG/M2 | DIASTOLIC BLOOD PRESSURE: 92 MMHG

## 2021-12-20 DIAGNOSIS — U07.1 PNEUMONIA DUE TO COVID-19 VIRUS: Primary | ICD-10-CM

## 2021-12-20 DIAGNOSIS — J12.82 PNEUMONIA DUE TO COVID-19 VIRUS: Primary | ICD-10-CM

## 2021-12-20 PROBLEM — Z20.822 SUSPECTED COVID-19 VIRUS INFECTION: Status: RESOLVED | Noted: 2021-11-19 | Resolved: 2021-12-20

## 2021-12-20 PROBLEM — J98.9 RESPIRATORY ILLNESS: Status: RESOLVED | Noted: 2021-11-29 | Resolved: 2021-12-20

## 2021-12-20 PROCEDURE — 99213 OFFICE O/P EST LOW 20 MIN: CPT | Performed by: FAMILY MEDICINE

## 2021-12-20 PROCEDURE — 1036F TOBACCO NON-USER: CPT | Performed by: FAMILY MEDICINE

## 2021-12-20 RX ORDER — BENZONATATE 100 MG/1
100 CAPSULE ORAL 3 TIMES DAILY PRN
Qty: 30 CAPSULE | Refills: 0 | Status: SHIPPED | OUTPATIENT
Start: 2021-12-20 | End: 2022-03-18

## 2021-12-30 ENCOUNTER — OFFICE VISIT (OUTPATIENT)
Dept: FAMILY MEDICINE CLINIC | Facility: CLINIC | Age: 56
End: 2021-12-30

## 2021-12-30 VITALS
TEMPERATURE: 97.4 F | SYSTOLIC BLOOD PRESSURE: 142 MMHG | HEART RATE: 91 BPM | BODY MASS INDEX: 29.06 KG/M2 | DIASTOLIC BLOOD PRESSURE: 80 MMHG | RESPIRATION RATE: 16 BRPM | WEIGHT: 164 LBS | HEIGHT: 63 IN | OXYGEN SATURATION: 97 %

## 2021-12-30 DIAGNOSIS — U09.9 POST-ACUTE SEQUELAE OF COVID-19 (PASC): ICD-10-CM

## 2021-12-30 DIAGNOSIS — J40 BRONCHITIS: Primary | ICD-10-CM

## 2021-12-30 DIAGNOSIS — U07.1 PNEUMONIA DUE TO COVID-19 VIRUS: ICD-10-CM

## 2021-12-30 DIAGNOSIS — R06.09 POST-COVID CHRONIC DYSPNEA: ICD-10-CM

## 2021-12-30 DIAGNOSIS — U09.9 POST-COVID CHRONIC DYSPNEA: ICD-10-CM

## 2021-12-30 DIAGNOSIS — J12.82 PNEUMONIA DUE TO COVID-19 VIRUS: ICD-10-CM

## 2021-12-30 PROCEDURE — 99214 OFFICE O/P EST MOD 30 MIN: CPT | Performed by: NURSE PRACTITIONER

## 2021-12-30 PROCEDURE — 3008F BODY MASS INDEX DOCD: CPT | Performed by: FAMILY MEDICINE

## 2021-12-30 RX ORDER — METHYLPREDNISOLONE 4 MG/1
TABLET ORAL
Qty: 21 EACH | Refills: 0 | Status: SHIPPED | OUTPATIENT
Start: 2021-12-30 | End: 2022-03-18

## 2021-12-30 RX ORDER — AZITHROMYCIN 250 MG/1
TABLET, FILM COATED ORAL
Qty: 6 TABLET | Refills: 0 | Status: SHIPPED | OUTPATIENT
Start: 2021-12-30 | End: 2022-01-03

## 2021-12-30 NOTE — LETTER
December 30, 2021     Patient: Bolivar Abel   YOB: 1965   Date of Visit: 12/30/2021       To Whom It May Concern: In accordance with her FMLA restrictions affording her a 1 day per week off, if needed, for current medical condition, I advised her that for at least the next few weeks she should take this 1 day off to transition her back into full work weeks without causing a relapse in her condition  I appreciate your cooperation with our medical advice  She may return to work to begin January 5, 2022  If you have any questions or concerns, please don't hesitate to call           Sincerely,        DEANDRE Lagunas    CC: No Recipients

## 2021-12-30 NOTE — PATIENT INSTRUCTIONS
Community Acquired Pneumonia   WHAT YOU NEED TO KNOW:   Community-acquired pneumonia (CAP) is a lung infection that you get outside of a hospital or nursing home setting  Your lungs become inflamed and cannot work well  CAP may be caused by bacteria, viruses, or fungi  DISCHARGE INSTRUCTIONS:   Return to the emergency department if:   · You are confused and cannot think clearly  · You have increased trouble breathing  · Your lips or fingernails turn gray or blue  Call your doctor if:   · Your symptoms do not get better, or they get worse  · You are urinating less, or not at all  · You have questions or concerns about your condition or care  Medicines:   · Medicines  may be given to treat a bacterial, viral, or fungal infection  You may also be given medicines to dilate your bronchial tubes to help you breathe more easily  · Take your medicine as directed  Contact your healthcare provider if you think your medicine is not helping or if you have side effects  Tell him or her if you are allergic to any medicine  Keep a list of the medicines, vitamins, and herbs you take  Include the amounts, and when and why you take them  Bring the list or the pill bottles to follow-up visits  Carry your medicine list with you in case of an emergency  Follow up with your doctor within 3 days or as directed: You may need another x-ray  Write down your questions so you remember to ask them during your visits  Deep breathing and coughing:  Deep breathing helps open the air passages in your lungs  Coughing helps bring up mucus from your lungs  Take a deep breath and hold the breath as long as you can  Then push the air out of your lungs with a deep, strong cough  Spit out any mucus you have coughed up  Take 10 deep breaths in a row every hour that you are awake  Remember to follow each deep breath with a cough    Do not smoke or allow others to smoke around you:  Nicotine and other chemicals in cigarettes and cigars can cause lung damage  Ask your healthcare provider for information if you currently smoke and need help to quit  E-cigarettes or smokeless tobacco still contain nicotine  Talk to your healthcare provider before you use these products  Manage CAP at home:   · Breathe warm, moist air  This helps loosen mucus  Loosely place a warm, wet washcloth over your nose and mouth  A room humidifier may also help make the air moist     · Drink liquids as directed  Ask your healthcare provider how much liquid to drink each day and which liquids to drink  Liquids help make mucus thin and easier to get out of your body  · Gently tap your chest   This helps loosen mucus so it is easier to cough  Lie with your head lower than your chest several times a day and tap your chest     · Get plenty of rest   Rest helps your body heal     Prevent CAP:       · Wash your hands often  Wash your hands several times each day  Wash after you use the bathroom, change a child's diaper, and before you prepare or eat food  Use soap and water every time  Rub your soapy hands together, lacing your fingers  Wash the front and back of your hands, and in between your fingers  Use the fingers of one hand to scrub under the fingernails of the other hand  Wash for at least 20 seconds  Rinse with warm, running water for several seconds  Then dry your hands with a clean towel or paper towel  Use hand  that contains alcohol if soap and water are not available  Do not touch your eyes, nose, or mouth without washing your hands first          · Cover a sneeze or cough  Use a tissue that covers your mouth and nose  Throw the tissue away in a trash can right away  Use the bend of your arm if a tissue is not available  Wash your hands well with soap and water or use a hand   Do not stand close to anyone who is sneezing or coughing  · Clean surfaces often    Clean doorknobs, countertops, cell phones, and other surfaces that are touched often  Use a disinfecting wipe, a single-use sponge, or a cloth you can wash and reuse  Use disinfecting  if you do not have wipes  You can create a disinfecting  by mixing 1 part bleach with 10 parts water  · Try to avoid people who have a cold or the flu  If you are sick, stay away from others as much as possible  · Ask about vaccines you may need  You may need vaccines to help prevent pneumonia and COVID-19  Get an influenza (flu) vaccine every year as soon as recommended, usually in September or October  Your healthcare provider can tell you if you should get any other vaccines, and when to get them  © Copyright [employer] 2021 Information is for End User's use only and may not be sold, redistributed or otherwise used for commercial purposes  All illustrations and images included in CareNotes® are the copyrighted property of [employer]  or Srinivas Cox  The above information is an  only  It is not intended as medical advice for individual conditions or treatments  Talk to your doctor, nurse or pharmacist before following any medical regimen to see if it is safe and effective for you

## 2022-01-03 NOTE — ASSESSMENT & PLAN NOTE
No signs of respiratory distress today  SpO2 97% on room air  Lungs are clear to auscultation, patient moving air throughout all lung spaces without difficulty  - Patient does have slight improvement of symptoms however continues to have some activity intolerance, dyspnea on exertion     - plan for referral to pulm within [facility] order set  - no imaging indicated at this time  - given report of ongoing chest congestion/phlegm and possible non resolution of pneumonia, will treat empirically with zithromax  - given cough is principal factor in patient having difficulty with return to work, will give burst of steroids

## 2022-01-03 NOTE — PROGRESS NOTES
Assessment/Plan:    Problem List Items Addressed This Visit        Respiratory    Pneumonia due to COVID-19 virus     No signs of respiratory distress today  SpO2 97% on room air  Lungs are clear to auscultation, patient moving air throughout all lung spaces without difficulty  - Patient does have slight improvement of symptoms however continues to have some activity intolerance, dyspnea on exertion  - plan for referral to pulm within [facility] order set  - no imaging indicated at this time  - given report of ongoing chest congestion/phlegm and possible non resolution of pneumonia, will treat empirically with zithromax  - given cough is principal factor in patient having difficulty with return to work, will give burst of steroids           Other Visit Diagnoses     Bronchitis    -  Primary    Relevant Medications    methylPREDNISolone 4 MG tablet therapy pack    azithromycin (ZITHROMAX) 250 mg tablet    Post-acute sequelae of COVID-19 ([facility])        Relevant Orders    Ambulatory referral to Pulmonology    Post-COVID chronic dyspnea        Relevant Orders    Ambulatory referral to Pulmonology            No follow-ups on file  A chart review was performed and previous primary care visit notes were reviewed  All applicable imaging studies were reviewed and images were reviewed personally  All applicable laboratory studies were reviewed personally  Care everywhere review was performed if  available and all pertinent notes were reviewed  Subjective:     HPI: Freddie Coleman is a 55 y o  female who  has a past medical history of Asthma and GERD (gastroesophageal reflux disease)  who presented to the office today for f/u for cough 2/2 covid pneumonia  Tested positive 12/8  Seen in ED on 12/13  Imaging did show b/l infiltrate  Finished amoxicillin from 11/29 encounter  Has had only supportive care medication since then  Seen televisit on 12/16  F/u in office on 12/20 - 98% on RA, lungs clear  Endorses slight improvement in activity tolerance  Will return to work on current FMLA orders filled out by Dr Bridget Lee allowing her one day per week off for add'l time if needed        The following portions of the patient's history were reviewed and updated as appropriate: allergies, current medications, past family history, past medical history, past social history, past surgical history, and problem list     Current Outpatient Medications on File Prior to Visit   Medication Sig Dispense Refill    acetaminophen (TYLENOL) 500 mg tablet Take 2 tablets (1,000 mg total) by mouth every 8 (eight) hours 30 tablet 1    albuterol (PROVENTIL HFA,VENTOLIN HFA) 90 mcg/act inhaler Inhale 2 puffs every 6 (six) hours as needed for wheezing 18 g 0    benzonatate (TESSALON PERLES) 100 mg capsule Take 1 capsule (100 mg total) by mouth 3 (three) times a day as needed for cough 30 capsule 0    Calcium Ascorbate (VITAMIN C) 500 mg tablet Take 1 tablet (500 mg total) by mouth daily 30 tablet 0    dextromethorphan-guaifenesin (MUCINEX DM)  MG per 12 hr tablet Take 1 tablet by mouth every 12 (twelve) hours as needed for cough 15 tablet 0    famotidine (PEPCID) 20 mg tablet Take 1 tablet (20 mg total) by mouth 2 (two) times a day (Patient not taking: Reported on 6/24/2021) 90 tablet 0    ibuprofen (MOTRIN) 600 mg tablet Take 1 tablet (600 mg total) by mouth every 6 (six) hours as needed for mild pain 30 tablet 0    loperamide (IMODIUM) 2 mg capsule Take 1 capsule (2 mg total) by mouth 4 (four) times a day as needed for diarrhea 12 capsule 0    omeprazole (PriLOSEC) 20 mg delayed release capsule Take 1 capsule (20 mg total) by mouth daily 90 capsule 3    ondansetron (ZOFRAN-ODT) 4 mg disintegrating tablet Take 1 tablet (4 mg total) by mouth every 8 (eight) hours as needed for nausea or vomiting 10 tablet 0    Vitamin D, Ergocalciferol, 2000 units CAPS ergocalciferol (vitamin D2) 50,000 unit capsule       No current facility-administered medications on file prior to visit  Review of Systems   Constitutional: Negative  HENT: Negative  Eyes: Negative  Respiratory: Positive for cough and shortness of breath  Cardiovascular: Negative  Gastrointestinal: Negative  Endocrine: Negative  Genitourinary: Negative  Musculoskeletal: Negative  Skin: Negative  Allergic/Immunologic: Negative  Neurological: Negative  Psychiatric/Behavioral: Negative  Objective:    /80 (BP Location: Right arm, Patient Position: Sitting, Cuff Size: Standard)   Pulse 91   Temp (!) 97 4 °F (36 3 °C)   Resp 16   Ht 5' 3" (1 6 m)   Wt 74 4 kg (164 lb)   SpO2 97%   Breastfeeding No   BMI 29 05 kg/m²     Physical Exam  Vitals reviewed  Constitutional:       General: She is not in acute distress  Appearance: Normal appearance  She is not ill-appearing, toxic-appearing or diaphoretic  HENT:      Head: Normocephalic and atraumatic  Right Ear: External ear normal       Left Ear: External ear normal       Nose: No congestion or rhinorrhea  Mouth/Throat:      Mouth: Mucous membranes are moist       Pharynx: Oropharynx is clear  No oropharyngeal exudate  Eyes:      General: No scleral icterus  Right eye: No discharge  Left eye: No discharge  Extraocular Movements: Extraocular movements intact  Conjunctiva/sclera: Conjunctivae normal    Cardiovascular:      Rate and Rhythm: Normal rate and regular rhythm  Pulses: Normal pulses  Heart sounds: No murmur heard  Pulmonary:      Effort: Pulmonary effort is normal  No respiratory distress  Breath sounds: Normal breath sounds  No stridor  No wheezing, rhonchi or rales  Comments: Normal lung sounds  Chest:      Chest wall: No tenderness  Abdominal:      General: Abdomen is flat  Bowel sounds are normal  There is no distension  Tenderness: There is no abdominal tenderness     Musculoskeletal: General: Normal range of motion  Cervical back: Normal range of motion  No rigidity or tenderness  Right lower leg: No edema  Left lower leg: No edema  Lymphadenopathy:      Cervical: No cervical adenopathy  Skin:     General: Skin is warm  Capillary Refill: Capillary refill takes less than 2 seconds  Neurological:      General: No focal deficit present  Mental Status: She is alert  Psychiatric:         Attention and Perception: Attention normal          Mood and Affect: Mood normal          DEANDRE Alvarez  01/03/22  9:10 AM    Patient Instructions     Community Acquired Pneumonia   WHAT YOU NEED TO KNOW:   Community-acquired pneumonia (CAP) is a lung infection that you get outside of a hospital or nursing home setting  Your lungs become inflamed and cannot work well  CAP may be caused by bacteria, viruses, or fungi  DISCHARGE INSTRUCTIONS:   Return to the emergency department if:   · You are confused and cannot think clearly  · You have increased trouble breathing  · Your lips or fingernails turn gray or blue  Call your doctor if:   · Your symptoms do not get better, or they get worse  · You are urinating less, or not at all  · You have questions or concerns about your condition or care  Medicines:   · Medicines  may be given to treat a bacterial, viral, or fungal infection  You may also be given medicines to dilate your bronchial tubes to help you breathe more easily  · Take your medicine as directed  Contact your healthcare provider if you think your medicine is not helping or if you have side effects  Tell him or her if you are allergic to any medicine  Keep a list of the medicines, vitamins, and herbs you take  Include the amounts, and when and why you take them  Bring the list or the pill bottles to follow-up visits  Carry your medicine list with you in case of an emergency  Follow up with your doctor within 3 days or as directed:   You may need another x-ray  Write down your questions so you remember to ask them during your visits  Deep breathing and coughing:  Deep breathing helps open the air passages in your lungs  Coughing helps bring up mucus from your lungs  Take a deep breath and hold the breath as long as you can  Then push the air out of your lungs with a deep, strong cough  Spit out any mucus you have coughed up  Take 10 deep breaths in a row every hour that you are awake  Remember to follow each deep breath with a cough  Do not smoke or allow others to smoke around you:  Nicotine and other chemicals in cigarettes and cigars can cause lung damage  Ask your healthcare provider for information if you currently smoke and need help to quit  E-cigarettes or smokeless tobacco still contain nicotine  Talk to your healthcare provider before you use these products  Manage CAP at home:   · Breathe warm, moist air  This helps loosen mucus  Loosely place a warm, wet washcloth over your nose and mouth  A room humidifier may also help make the air moist     · Drink liquids as directed  Ask your healthcare provider how much liquid to drink each day and which liquids to drink  Liquids help make mucus thin and easier to get out of your body  · Gently tap your chest   This helps loosen mucus so it is easier to cough  Lie with your head lower than your chest several times a day and tap your chest     · Get plenty of rest   Rest helps your body heal     Prevent CAP:       · Wash your hands often  Wash your hands several times each day  Wash after you use the bathroom, change a child's diaper, and before you prepare or eat food  Use soap and water every time  Rub your soapy hands together, lacing your fingers  Wash the front and back of your hands, and in between your fingers  Use the fingers of one hand to scrub under the fingernails of the other hand  Wash for at least 20 seconds  Rinse with warm, running water for several seconds   Then dry your hands with a clean towel or paper towel  Use hand  that contains alcohol if soap and water are not available  Do not touch your eyes, nose, or mouth without washing your hands first          · Cover a sneeze or cough  Use a tissue that covers your mouth and nose  Throw the tissue away in a trash can right away  Use the bend of your arm if a tissue is not available  Wash your hands well with soap and water or use a hand   Do not stand close to anyone who is sneezing or coughing  · Clean surfaces often  Clean doorknobs, countertops, cell phones, and other surfaces that are touched often  Use a disinfecting wipe, a single-use sponge, or a cloth you can wash and reuse  Use disinfecting  if you do not have wipes  You can create a disinfecting  by mixing 1 part bleach with 10 parts water  · Try to avoid people who have a cold or the flu  If you are sick, stay away from others as much as possible  · Ask about vaccines you may need  You may need vaccines to help prevent pneumonia and COVID-19  Get an influenza (flu) vaccine every year as soon as recommended, usually in September or October  Your healthcare provider can tell you if you should get any other vaccines, and when to get them  © Copyright [employer] 2021 Information is for End User's use only and may not be sold, redistributed or otherwise used for commercial purposes  All illustrations and images included in CareNotes® are the copyrighted property of A D A M , Inc  or Srinivas Juárez   The above information is an  only  It is not intended as medical advice for individual conditions or treatments  Talk to your doctor, nurse or pharmacist before following any medical regimen to see if it is safe and effective for you

## 2022-01-20 ENCOUNTER — CONSULT (OUTPATIENT)
Dept: PULMONOLOGY | Facility: CLINIC | Age: 57
End: 2022-01-20
Payer: COMMERCIAL

## 2022-01-20 VITALS
HEART RATE: 86 BPM | DIASTOLIC BLOOD PRESSURE: 86 MMHG | SYSTOLIC BLOOD PRESSURE: 142 MMHG | RESPIRATION RATE: 18 BRPM | OXYGEN SATURATION: 98 % | TEMPERATURE: 97.8 F | BODY MASS INDEX: 32.71 KG/M2 | HEIGHT: 60 IN | WEIGHT: 166.6 LBS

## 2022-01-20 DIAGNOSIS — J45.20 MILD INTERMITTENT ASTHMA WITHOUT COMPLICATION: ICD-10-CM

## 2022-01-20 DIAGNOSIS — R06.09 POST-COVID CHRONIC DYSPNEA: ICD-10-CM

## 2022-01-20 DIAGNOSIS — U09.9 POST-COVID CHRONIC DYSPNEA: ICD-10-CM

## 2022-01-20 DIAGNOSIS — U09.9 POST-ACUTE SEQUELAE OF COVID-19 (PASC): Primary | ICD-10-CM

## 2022-01-20 PROCEDURE — 99204 OFFICE O/P NEW MOD 45 MIN: CPT | Performed by: INTERNAL MEDICINE

## 2022-01-20 PROCEDURE — 3008F BODY MASS INDEX DOCD: CPT | Performed by: NURSE PRACTITIONER

## 2022-01-20 PROCEDURE — 1036F TOBACCO NON-USER: CPT | Performed by: INTERNAL MEDICINE

## 2022-01-20 PROCEDURE — 3008F BODY MASS INDEX DOCD: CPT | Performed by: INTERNAL MEDICINE

## 2022-01-20 RX ORDER — ASCORBIC ACID 500 MG
500 TABLET ORAL DAILY
COMMUNITY
Start: 2021-12-06 | End: 2022-03-18

## 2022-01-20 NOTE — PROGRESS NOTES
Pulmonary Consultation   [facility] 55 y o  female MRN: [id number]    Encounter: [id number]      Reason for consultation:   Leonamagan Ericksonsusan evaluation    Requesting physician:  DEANDRE Taylor       Impressions:   · Recent COVID infection  · Bronchial asthma  Recommendations:  · Complete pulmonary function test   · Albuterol rescue inhaler 2 inhalations 4 times a day as needed  · Follow-up in 3 months  Discussion:  The patient has recovered from [place]  Her symptoms currently are nonspecific  I have reassured the patient that her clinical exam was normal   Her O2 saturation was normal   Her intermittent cough is most likely from her asthma  Overall her asthma is well controlled  I have maintained her on the albuterol rescue inhaler 2 inhalations 4 times a day as needed  I have ordered a complete pulmonary function test   I have answered the patient's questions about the vaccine  She should get it after 90 days from her infection  I will see her in 3 months in a follow-up visit  History of Present Illness   HPI:  [facility] is a 55 y o  female who is here for evaluation because of COVID recent in fact  The patient had COVID sometime in December  It was mild and she did not need to be admitted to the hospital   She has recovered and most of her symptoms have improved  She still gets what she describes as chest discomfort which is not consistent  Usually it starts late in the day  She has occasional cough  No significant sputum production  The patient has history of bronchial asthma which is mild and intermittent  It rarely creates problems  She is on albuterol rescue inhaler 2 inhalations 4 times a day as needed  She has no nocturnal symptoms  Review of systems:  12 point review of systems was completed and was otherwise negative except as listed in HPI        Historical Information   Past Medical History:   Diagnosis Date    Asthma     GERD (gastroesophageal reflux disease)      Past Surgical History:   Procedure Laterality Date    NO PAST SURGERIES       Family History   Problem Relation Age of Onset    No Known Problems Mother     No Known Problems Father     Dementia Maternal Grandmother         Without behavioral disturbance, unspecified dementia type     Heart failure Maternal Grandmother     Hypertension Maternal Grandmother     Diabetes type II Maternal Grandmother     Breast cancer Maternal Aunt         62    No Known Problems Daughter     No Known Problems Paternal Grandfather     Breast cancer Maternal Aunt         62s    No Known Problems Maternal Aunt     Colon cancer Neg Hx     Ovarian cancer Neg Hx        Family History:  No family history of chronic lung disease  Social History:  The patient is  and lives with her , daughter and grandchildren lifelong nonsmoker  Drinks alcohol socially  Meds/Allergies   No current facility-administered medications for this visit  (Not in a hospital admission)    No Known Allergies    Vitals: Blood pressure 142/86, pulse 86, temperature 97 8 °F (36 6 °C), resp  rate 18, height 5' (1 524 m), weight 75 6 kg (166 lb 9 6 oz), SpO2 98 %, not currently breastfeeding ,      Physical exam:        Head/eyes:    Normocephalic, without obvious abnormality, atraumatic,         PERRL, extraocular muscles intact, no scleral icterus    Nose:   Nares normal, septum midline, mucosa normal, no drainage    or sinus tenderness   Throat:   Moist mucous membranes, no thrush   Neck:   Supple, trachea midline, no adenopathy; no carotid    bruit or JVD   Lungs:     Clear breath sounds  No wheezing rhonchi          Heart:    Regular rate and rhythm, S1 and S2 normal, no murmur, rub   or gallop   Abdomen:     Soft, non-tender, bowel sounds active all four quadrants,     no masses, no organomegaly   Extremities:   Extremities normal, atraumatic, no cyanosis or edema   Skin:   Warm, dry, turgor normal, no rashes or lesions   Neurologic:   CNII-XII intact, normal strength, non-focal             Imaging and other studies: I have personally reviewed pertinent films in PACS chest x-ray is reviewed on the [facility] system  Showed no acute pulmonary findings      Lab Results   Component Value Date    WBC 6 04 08/01/2017    HGB 11 7 08/01/2017    HCT 37 0 08/01/2017    MCV 82 08/01/2017     08/01/2017     Lab Results   Component Value Date    SODIUM 137 07/07/2021    K 3 9 07/07/2021     07/07/2021    CO2 27 07/07/2021    BUN 15 07/07/2021    CREATININE 0 91 07/07/2021    CALCIUM 9 4 07/07/2021             Roby Parnell MD

## 2022-02-02 ENCOUNTER — OFFICE VISIT (OUTPATIENT)
Dept: FAMILY MEDICINE CLINIC | Facility: CLINIC | Age: 57
End: 2022-02-02

## 2022-02-02 DIAGNOSIS — B34.9 VIRAL INFECTION, UNSPECIFIED: Primary | ICD-10-CM

## 2022-02-02 PROCEDURE — U0003 INFECTIOUS AGENT DETECTION BY NUCLEIC ACID (DNA OR RNA); SEVERE ACUTE RESPIRATORY SYNDROME CORONAVIRUS 2 (SARS-COV-2) (CORONAVIRUS DISEASE [COVID-19]), AMPLIFIED PROBE TECHNIQUE, MAKING USE OF HIGH THROUGHPUT TECHNOLOGIES AS DESCRIBED BY CMS-2020-01-R: HCPCS | Performed by: NURSE PRACTITIONER

## 2022-02-02 PROCEDURE — U0005 INFEC AGEN DETEC AMPLI PROBE: HCPCS | Performed by: NURSE PRACTITIONER

## 2022-02-02 PROCEDURE — 99442 PR PHYS/QHP TELEPHONE EVALUATION 11-20 MIN: CPT | Performed by: NURSE PRACTITIONER

## 2022-02-02 NOTE — PROGRESS NOTES
COVID-19 Outpatient Progress Note    Assessment/Plan:    Problem List Items Addressed This Visit     None      Visit Diagnoses     Viral infection, unspecified    -  Primary    Relevant Orders    COVID Only- Collected at [facility] or [facility]      ·  explained possibility of positive test given she tested positive 2 months ago, employer still requiring this, may get a bill from insurance, pt understanding of this  · More likely has a stomach virus  Supportive measures discussed at length: Maintain hydration by drinking small amounts of clear fluids frequently, then soft diet, and then advance diet as tolerated  May use OTC Imodium if desired for any diarrhea  Symptomatic therapy suggested: rest, increase fluids, use mist of vaporizer prn, OTC acetaminophen, antihistamine-decongestant of choice, cough suppressant of choice and call prn if symptoms persist or worsen  Call if symptoms worsen, high fever, severe weakness or fainting, increased abdominal pain, blood in stool or vomit, or failure to improve in 2-3 days  Disposition:     Recommended patient to come to the office to test for COVID-19  Patient is not fully vaccinated and I recommended self quarantine for 5 days followed by strict mask use for an additional 5 days  If patient were to develop symptoms, they should immediately self isolate and call our office for further guidance  I have spent 12 minutes directly with the patient  Greater than 50% of this time was spent in counseling/coordination of care regarding: prognosis, risks and benefits of treatment options, instructions for management, patient and family education, importance of treatment compliance, risk factor reductions and impressions        Encounter provider [address] [place] [address]    Provider located at [address] [address] [phone]    Recent Visits  No visits were found meeting these conditions  Showing recent visits within past 7 days and meeting all other requirements  Today's Visits  Date Type Provider Dept   02/02/22 Office Visit [facility]   Showing today's visits and meeting all other requirements  Future Appointments  No visits were found meeting these conditions  Showing future appointments within next 150 days and meeting all other requirements     This virtual check-in was done via telephone and she agrees to proceed  Patient agrees to participate in a virtual check in via telephone or video visit instead of presenting to the office to address urgent/immediate medical needs  Patient is aware this is a billable service  After connecting through Telephone, the patient was identified by name and date of birth  Gabi Calvillo was informed that this was a telemedicine visit and that the exam was being conducted confidentially over secure lines  Gabi Calvillo acknowledged consent and understanding of privacy and security of the telemedicine visit  I informed the patient that I have reviewed her record in Epic and presented the opportunity for her to ask any questions regarding the visit today  The patient agreed to participate  Verification of patient location:  Patient is located in the following state in which I hold an active license: [place]    Subjective:   Gabi Calvillo is a 55 y o  female who is concerned about COVID-19  Patient's symptoms include malaise, nausea and diarrhea   Patient denies fever      - Date of symptom onset: 1/29/2022      COVID-19 vaccination status: Not vaccinated    Exposure:   Contact with a person who is under investigation (PUI) for or who is positive for COVID-19 within the last 14 days?: No    Hospitalized recently for fever and/or lower respiratory symptoms?: No      Currently a healthcare worker that is involved in direct patient care?: No      Works in a special setting where the risk of COVID-19 transmission may be high? (this may include long-term care, correctional and alf facilities; homeless shelters; assisted-living facilities and group homes ): No      Resident in a special setting where the risk of COVID-19 transmission may be high? (this may include long-term care, correctional and alf facilities; homeless shelters; assisted-living facilities and group homes ): No      Lab Results   Component Value Date    SARSCOV2 Positive (A) 12/07/2021    [address] [address] Not Detected 07/16/2020     Past Medical History:   Diagnosis Date    Asthma     GERD (gastroesophageal reflux disease)      Past Surgical History:   Procedure Laterality Date    NO PAST SURGERIES       Current Outpatient Medications   Medication Sig Dispense Refill    acetaminophen (TYLENOL) 500 mg tablet Take 2 tablets (1,000 mg total) by mouth every 8 (eight) hours (Patient not taking: Reported on 1/20/2022 ) 30 tablet 1    albuterol (PROVENTIL HFA,VENTOLIN HFA) 90 mcg/act inhaler Inhale 2 puffs every 6 (six) hours as needed for wheezing 18 g 0    ascorbic acid (VITAMIN C) 500 MG tablet Take 500 mg by mouth daily      benzonatate (TESSALON PERLES) 100 mg capsule Take 1 capsule (100 mg total) by mouth 3 (three) times a day as needed for cough (Patient not taking: Reported on 1/20/2022 ) 30 capsule 0    Calcium Ascorbate (VITAMIN C) 500 mg tablet Take 1 tablet (500 mg total) by mouth daily 30 tablet 0    dextromethorphan-guaifenesin (MUCINEX DM)  MG per 12 hr tablet Take 1 tablet by mouth every 12 (twelve) hours as needed for cough (Patient not taking: Reported on 1/20/2022 ) 15 tablet 0    famotidine (PEPCID) 20 mg tablet Take 1 tablet (20 mg total) by mouth 2 (two) times a day (Patient not taking: Reported on 6/24/2021) 90 tablet 0    ibuprofen (MOTRIN) 600 mg tablet Take 1 tablet (600 mg total) by mouth every 6 (six) hours as needed for mild pain (Patient not taking: Reported on 1/20/2022 ) 30 tablet 0    loperamide (IMODIUM) 2 mg capsule Take 1 capsule (2 mg total) by mouth 4 (four) times a day as needed for diarrhea (Patient not taking: Reported on 1/20/2022 ) 12 capsule 0    methylPREDNISolone 4 MG tablet therapy pack Use as directed on package (Patient not taking: Reported on 1/20/2022 ) 21 each 0    omeprazole (PriLOSEC) 20 mg delayed release capsule Take 1 capsule (20 mg total) by mouth daily 90 capsule 3    ondansetron (ZOFRAN-ODT) 4 mg disintegrating tablet Take 1 tablet (4 mg total) by mouth every 8 (eight) hours as needed for nausea or vomiting (Patient not taking: Reported on 1/20/2022 ) 10 tablet 0    Vitamin D, Ergocalciferol, 2000 units CAPS ergocalciferol (vitamin D2) 50,000 unit capsule (Patient not taking: Reported on 1/20/2022)       No current facility-administered medications for this visit  No Known Allergies    Review of Systems   Constitutional: Negative for fever  Gastrointestinal: Positive for diarrhea and nausea  All other systems reviewed and are negative  Objective: There were no vitals filed for this visit  Physical Exam  Constitutional:       General: She is not in acute distress  Pulmonary:      Effort: Pulmonary effort is normal  No respiratory distress  Neurological:      Mental Status: She is alert and oriented to person, place, and time  Psychiatric:         Mood and Affect: Mood normal          Thought Content: Thought content normal          VIRTUAL VISIT [address] verbally agrees to participate in [employer]  Pt is aware that [employer] could be limited without vital signs or the ability to perform a full hands-on physical [address] understands she or the provider may request at any time to terminate the video visit and request the patient to seek care or treatment in person

## 2022-02-02 NOTE — LETTER
[address]  [address] [facility], [facility]  Dept: [phone]    February 2, 2022    Patient: Jurgen Espinoza [place]  YOB: 1965    Mega Martell was seen and evaluated at our [facility]  Please note if Covid test is negative, they may return to work when fever free for 24 hours without the use of a fever reducing agent  If Covid test is positive, they may return to work on 02/04/22, as this is 5 full days from the onset of symptoms  Upon return, they must then adhere to strict masking for an additional 5 days      Sincerely,    DEANDRE Banerjee

## 2022-02-03 ENCOUNTER — TELEPHONE (OUTPATIENT)
Dept: FAMILY MEDICINE CLINIC | Facility: CLINIC | Age: 57
End: 2022-02-03

## 2022-02-03 ENCOUNTER — TELEPHONE (OUTPATIENT)
Dept: URGENT CARE | Facility: MEDICAL CENTER | Age: 57
End: 2022-02-03

## 2022-02-03 LAB — SARS-COV-2 RNA RESP QL NAA+PROBE: NORMAL

## 2022-02-03 PROCEDURE — U0003 INFECTIOUS AGENT DETECTION BY NUCLEIC ACID (DNA OR RNA); SEVERE ACUTE RESPIRATORY SYNDROME CORONAVIRUS 2 (SARS-COV-2) (CORONAVIRUS DISEASE [COVID-19]), AMPLIFIED PROBE TECHNIQUE, MAKING USE OF HIGH THROUGHPUT TECHNOLOGIES AS DESCRIBED BY CMS-2020-01-R: HCPCS | Performed by: NURSE PRACTITIONER

## 2022-02-03 PROCEDURE — U0005 INFEC AGEN DETEC AMPLI PROBE: HCPCS | Performed by: NURSE PRACTITIONER

## 2022-02-03 NOTE — TELEPHONE ENCOUNTER
Pt called office stating that she need her work letter revised due to inclusive Covid result-problem resolved

## 2022-02-03 NOTE — TELEPHONE ENCOUNTER
Pt called in requesting if letter on file can be revised to return to work Monday 2/7/22 due to pt still not feeling well having body aches and COVID test results still not in  Please advise

## 2022-02-03 NOTE — LETTER
[address]  [address] [facility], [facility]  Dept: [phone]    February 3, 2022    Patient: Jg Boland [facility]  YOB: 1965    Mary Alston was seen and evaluated at our [facility]  Please note if Covid test is negative, they may return to work when fever free for 24 hours without the use of a fever reducing agent  If Covid test is positive, they may return to work on 02/08/22, as this is 5 full days from the onset of symptoms  Upon return, they must then adhere to strict masking for an additional 5 days        Sincerely,    DEANDRE Bauer

## 2022-03-18 ENCOUNTER — OFFICE VISIT (OUTPATIENT)
Dept: FAMILY MEDICINE CLINIC | Facility: CLINIC | Age: 57
End: 2022-03-18

## 2022-03-18 VITALS
HEIGHT: 60 IN | SYSTOLIC BLOOD PRESSURE: 150 MMHG | WEIGHT: 167 LBS | BODY MASS INDEX: 32.79 KG/M2 | HEART RATE: 91 BPM | RESPIRATION RATE: 16 BRPM | OXYGEN SATURATION: 98 % | DIASTOLIC BLOOD PRESSURE: 90 MMHG | TEMPERATURE: 98 F

## 2022-03-18 DIAGNOSIS — R03.0 ELEVATED BLOOD PRESSURE READING: Primary | ICD-10-CM

## 2022-03-18 PROCEDURE — 3008F BODY MASS INDEX DOCD: CPT | Performed by: FAMILY MEDICINE

## 2022-03-18 PROCEDURE — 93000 ELECTROCARDIOGRAM COMPLETE: CPT | Performed by: FAMILY MEDICINE

## 2022-03-18 PROCEDURE — 99213 OFFICE O/P EST LOW 20 MIN: CPT | Performed by: FAMILY MEDICINE

## 2022-03-18 NOTE — PROGRESS NOTES
[facility]   [address], [address]  Phone#  [phone]  Fax#  [phone]    ASSESSMENT and PLAN  Elevated blood pressure reading  · Current /90, Goal BP <140/90/<150/90 (Age >60) per JNC 8 guidelines uncontrolled   Current Home Medications: None   Statin currently used: None , ASCVD Risk-7 3%   Educated and counseled on the importance of Lifestyle changes including smoking cessation, controlling blood sugar and lipids, DASH diet, reducing sodium intake to no more than 2,400 mg/day and on moderate physical activity 3-4 days per week averaging 40 minutes per session  Monitor excessive caffeine intake,    Red flag symptoms reviewed with patient including chest pain, SOB, changes in vision, vomiting, headaches, paresthesias and ER precautions given if these develop   Encourage to check BP's at least 4x per week  Always take it in the left arm, while seated, and after waiting for at least 5 minutes  Record BP readings in blood pressure logs provided   EKG see below-no acute ST-T wave changes    Fasting metabolic panel with eGFR to evaluate for renal insufficiency, hyperglycemia, hypokalemia, hyperuricemia, or hypercalcemia   Fasting lipid panel to evaluate for high LDL, low HDL, or high triglycerides   Hemoglobin level to evaluate for anemia or polycythemia, which may suggest secondary cause of complication   TSH to evaluate for high or low if thyroid dysfunction   ER precautions given     RTC in 3-4 weeks for evaluation of blood pressure logs      Procedure Note: EKG  Date/Time: 03/18/22 4:41 PM   Interpreted by: Funmi Brennan  Indications / Diagnosis: elevated blood pressure  ECG reviewed by me, the ED Provider: yes   The EKG demonstrates:  Rhythm: normal sinus  Intervals: normal intervals  Axis: normal axis  QRS/Blocks: normal QRS  ST Changes: No acute ST Changes, no STD/KATIE  Some questionable Q waves in lateral leads    Diagnoses and all orders for this visit:    Elevated blood pressure reading  -     Lipid Panel with Direct LDL reflex; Future  -     CBC and differential; Future  -     TSH, 3rd generation with Free T4 reflex; Future  -     POCT ECG  -     Comprehensive metabolic panel; Future    BMI 32 0-32 9,adult  -     HEMOGLOBIN A1C W/ EAG ESTIMATION; Future  -     Comprehensive metabolic panel; Future      HISTORY OF PRESENT ILLNESS  Libby Gan is a very pleasant 55 y o  female with a significant PMHx of GERD ,asthma,COVID-19 ,vitamin-D deficiency that presents today for a same-day visit for elevated blood pressure  She states the she had COVID in December of 2021 and has been suffering symptoms and is currently seeing Pulm  She denies any cigarette smoking, diabetes histoRy, or family history of CAD    REVIEW OF SYSTEMS  Review of Systems   Constitutional: Negative for chills, fatigue and fever  HENT: Negative for sore throat  Respiratory: Negative for cough, chest tightness and shortness of breath  Cardiovascular: Negative for chest pain and leg swelling  Gastrointestinal: Negative for constipation, diarrhea, nausea and vomiting  Endocrine: Negative for polydipsia, polyphagia and polyuria  Genitourinary: Negative for dysuria  Musculoskeletal: Negative for arthralgias  Skin: Negative for rash  Neurological: Negative for dizziness, light-headedness and headaches  Psychiatric/Behavioral: Negative for agitation and behavioral problems       PAST MEDICAL HISTORY   Past Medical History:   Diagnosis Date    Asthma     GERD (gastroesophageal reflux disease)      Past Surgical History:   Procedure Laterality Date    NO PAST SURGERIES       Social History     Socioeconomic History    Marital status: /Civil Union     Spouse name: Not on file    Number of children: Not on file    Years of education: Not on file    Highest education level: Not on file   Occupational History    Not on file   Tobacco Use    Smoking status: Never Smoker    Smokeless tobacco: Never Used   Vaping Use    Vaping Use: Never used   Substance and Sexual Activity    Alcohol use: Yes    Drug use: Never    Sexual activity: Yes     Partners: Male     Birth control/protection: Post-menopausal   Other Topics Concern    Not on file   Social History Narrative    No preference on Christian beliefs      Social Determinants of Health     Financial Resource Strain: Low Risk     Difficulty of Paying Living Expenses: Not hard at all   Food Insecurity: No Food Insecurity    Worried About Running Out of Food in the Last Year: Never true    Barb of Food in the Last Year: Never true   Transportation Needs: No Transportation Needs    Lack of Transportation (Medical): No    Lack of Transportation (Non-Medical): No   Physical Activity: Not on file   Stress: No Stress Concern Present    Feeling of Stress :  Only a little   Social Connections: Not on file   Intimate Partner Violence: Not on file   Housing Stability: Unknown    Unable to Pay for Housing in the Last Year: No    Number of Places Lived in the Last Year: Not on file    Unstable Housing in the Last Year: No     Family History   Problem Relation Age of Onset    No Known Problems Mother     No Known Problems Father     Dementia Maternal Grandmother         Without behavioral disturbance, unspecified dementia type     Heart failure Maternal Grandmother     Hypertension Maternal Grandmother     Diabetes type II Maternal Grandmother     Breast cancer Maternal Aunt         62    No Known Problems Daughter     No Known Problems Paternal Grandfather     Breast cancer Maternal Aunt         62s    No Known Problems Maternal Aunt     Colon cancer Neg Hx     Ovarian cancer Neg Hx      MEDICATIONS    Current Outpatient Medications:     famotidine (PEPCID) 20 mg tablet, Take 1 tablet (20 mg total) by mouth 2 (two) times a day (Patient not taking: Reported on 6/24/2021), Disp: 90 tablet, Rfl: 0    omeprazole (PriLOSEC) 20 mg delayed release capsule, Take 1 capsule (20 mg total) by mouth daily, Disp: 90 capsule, Rfl: 3    Vitamin D, Ergocalciferol, 2000 units CAPS, ergocalciferol (vitamin D2) 50,000 unit capsule (Patient not taking: Reported on 1/20/2022), Disp: , Rfl:     PHYSICAL EXAM  Vitals:    03/18/22 1527   BP: 150/90   BP Location: Left arm   Patient Position: Sitting   Cuff Size: Standard   Pulse: 91   Resp: 16   Temp: 98 °F (36 7 °C)   TempSrc: Temporal   SpO2: 98%   Weight: 75 8 kg (167 lb)   Height: 5' (1 524 m)     Wt Readings from Last 3 Encounters:   03/18/22 75 8 kg (167 lb)   01/20/22 75 6 kg (166 lb 9 6 oz)   12/30/21 74 4 kg (164 lb)    , Body mass index is 32 61 kg/m²  Physical Exam  Constitutional:       Appearance: She is well-developed  HENT:      Head: Normocephalic and atraumatic  Eyes:      Pupils: Pupils are equal, round, and reactive to light  Cardiovascular:      Rate and Rhythm: Normal rate and regular rhythm  Heart sounds: Normal heart sounds  Pulmonary:      Effort: Pulmonary effort is normal       Breath sounds: Normal breath sounds  Abdominal:      General: Bowel sounds are normal       Palpations: Abdomen is soft  Musculoskeletal:         General: Normal range of motion  Cervical back: Normal range of motion and neck supple  Skin:     General: Skin is warm  Findings: No erythema or rash  Neurological:      Mental Status: She is alert and oriented to person, place, and time  Psychiatric:         Behavior: Behavior normal        LABS/IMAGING  No results found for: HGBA1C  HDL, Direct   Date Value Ref Range Status   07/07/2021 47 >=40 mg/dL Final     Comment:     HDL Cholesterol:       Low     <41 mg/dL  Specimen collection should occur prior to Metamizole administration due to the potential for falsley depressed results       Triglycerides   Date Value Ref Range Status   07/07/2021 184 (H) <=150 mg/dL Final     Comment:     Triglyceride:     Normal          <150 mg/dl     Borderline High 150-199 mg/dl     High            200-499 mg/dl        Very High       >499 mg/dl    Specimen collection should occur prior to N-Acetylcysteine or Metamizole administration due to the potential for falsely depressed results        WBC   Date Value Ref Range Status   08/01/2017 6 04 4 31 - 10 16 Thousand/uL Final   10/14/2014 6 16 4 31 - 10 16 Thousand/uL Final     Hemoglobin   Date Value Ref Range Status   08/01/2017 11 7 11 5 - 15 4 g/dL Final   10/14/2014 12 4 11 5 - 15 4 g/dL Final     Platelets   Date Value Ref Range Status   08/01/2017 338 149 - 390 Thousands/uL Final   10/14/2014 322 149 - 390 Thousand/uL Final     Potassium   Date Value Ref Range Status   07/07/2021 3 9 3 5 - 5 3 mmol/L Final   06/08/2020 4 2 3 5 - 5 3 mmol/L Final   05/13/2019 3 8 3 6 - 5 0 mmol/L Final   10/14/2014 3 9 3 6 - 5 0 mmol/L Final     Chloride   Date Value Ref Range Status   07/07/2021 106 100 - 108 mmol/L Final   06/08/2020 109 (H) 100 - 108 mmol/L Final   05/13/2019 104 97 - 108 mmol/L Final   10/14/2014 105 101 - 111 mmol/L Final     CO2   Date Value Ref Range Status   07/07/2021 27 21 - 32 mmol/L Final   06/08/2020 26 21 - 32 mmol/L Final   05/13/2019 30 22 - 30 mmol/L Final   10/14/2014 25 21 - 31 mmol/L Final     Anion Gap   Date Value Ref Range Status   10/14/2014 12 4 - 13 mmol/L Final     BUN   Date Value Ref Range Status   07/07/2021 15 5 - 25 mg/dL Final   06/08/2020 13 5 - 25 mg/dL Final   05/13/2019 12 5 - 25 mg/dL Final   10/14/2014 11 5 - 27 mg/dL Final     Creatinine   Date Value Ref Range Status   07/07/2021 0 91 0 60 - 1 30 mg/dL Final     Comment:     Standardized to IDMS reference method   06/08/2020 0 83 0 60 - 1 30 mg/dL Final     Comment:     Standardized to IDMS reference method   05/13/2019 0 86 0 60 - 1 20 mg/dL Final     Comment:     Standardized to IDMS reference method   10/14/2014 0 85 0 60 - 1 30 mg/dL Final Comment:     Standardized to IDMS reference method     eGFR   Date Value Ref Range Status   07/07/2021 82 ml/min/1 73sq m Final   06/08/2020 93 ml/min/1 73sq m Final   05/13/2019 90 >60 ml/min/1 73sq m Final     Glucose   Date Value Ref Range Status   10/14/2014 106 65 - 140 mg/dL Final     Comment:     If patient is fasting, the ADA then defines impaired fasting glucose as  >100 mg/dl and diabetes as  >or equal to 126 mg/dl  Calcium   Date Value Ref Range Status   07/07/2021 9 4 8 3 - 10 1 mg/dL Final   06/08/2020 8 9 8 3 - 10 1 mg/dL Final   05/13/2019 9 5 8 4 - 10 2 mg/dL Final   10/14/2014 9 2 8 3 - 10 1 mg/dL Final     AST   Date Value Ref Range Status   07/07/2021 24 5 - 45 U/L Final     Comment:     Specimen collection should occur prior to Sulfasalazine administration due to the potential for falsely depressed results  06/08/2020 18 5 - 45 U/L Final     Comment:       Specimen collection should occur prior to Sulfasalazine administration due to the potential for falsely depressed results  05/13/2019 25 14 - 36 U/L Final     Comment:       Specimen collection should occur prior to Sulfasalazine administration due to the potential for falsely depressed results  10/14/2014 22 10 - 42 U/L Final     ALT   Date Value Ref Range Status   07/07/2021 28 12 - 78 U/L Final     Comment:     Specimen collection should occur prior to Sulfasalazine and/or Sulfapyridine administration due to the potential for falsely depressed results  06/08/2020 25 12 - 78 U/L Final     Comment:       Specimen collection should occur prior to Sulfasalazine and/or Sulfapyridine administration due to the potential for falsely depressed results  05/13/2019 14 9 - 52 U/L Final     Comment:       Specimen collection should occur prior to Sulfasalazine administration due to the potential for falsely depressed results      10/14/2014 24 6 - 78 U/L Final     Alkaline Phosphatase   Date Value Ref Range Status   07/07/2021 120 (H) 46 - 116 U/L Final   06/08/2020 98 46 - 116 U/L Final   05/13/2019 95 43 - 122 U/L Final   10/14/2014 122 (H) 42 - 121 U/L Final     Total Protein   Date Value Ref Range Status   10/14/2014 8 5 (H) 6 4 - 8 2 g/dL Final     Total Bilirubin   Date Value Ref Range Status   10/14/2014 0 3 0 2 - 1 0 mg/dL Final     No results found for: TSH    This note has been dictated using [employer] software  It may contain errors, including improperly dictated words  Please contact physician directly for any questions  Christine Quiroz MD   PGY-2    BMI Counseling: Body mass index is 32 61 kg/m²  The BMI is above normal  Nutrition recommendations include reducing portion sizes, decreasing overall calorie intake and 3-5 servings of fruits/vegetables daily  Exercise recommendations include vigorous aerobic physical activity for 75 minutes/week

## 2022-03-18 NOTE — PATIENT INSTRUCTIONS
DASH Eating Plan   AMBULATORY CARE:   The DASH (Dietary Approaches to Stop Hypertension) Eating Plan  is designed to help prevent or lower high blood pressure  It can also help to lower LDL (bad) cholesterol and decrease your risk for heart disease  The plan is low in sodium, sugar, unhealthy fats, and total fat  It is high in potassium, calcium, magnesium, and fiber  These nutrients are added when you eat more fruits, vegetables, and whole grains  With the DASH eating plan, you need to eat a certain number of servings from each food group  This will help you get enough of certain nutrients and limit others  The amount of servings you should eat depends on how many calories you need  Your dietitian can help you create meal plans with the right number of servings for each food group  What you need to know about sodium:  Your dietitian will tell you how much sodium is safe for you to have each day  People with high blood pressure should have no more than 1,500 to 2,300 mg of sodium in a day  A teaspoon (tsp) of salt has 2,300 mg of sodium  This may seem like a difficult goal, but small changes to the foods you eat can make a big difference  Your healthcare provider or dietitian can help you create a meal plan that follows your sodium limit  · Read food labels  Food labels can help you choose foods that are low in sodium  The amount of sodium is listed in milligrams (mg)  The % Daily Value (DV) column tells you how much of your daily needs are met by 1 serving of the food for each nutrient listed  Choose foods that have less than 5% of the DV of sodium  These foods are considered low in sodium  Foods that have 20% or more of the DV of sodium are considered high in sodium  Avoid foods that have more than 300 mg of sodium in each serving  Choose foods that say low-sodium, reduced-sodium, or no salt added on the food label  · Limit added salt  Do not salt food at the table if you add salt when you cook  Use herbs and spices, such as onions, garlic, and salt-free seasonings to add flavor  Try lemon or lime juice or vinegar to add a tart flavor  Use hot peppers or a small amount of hot pepper sauce to add a spicy flavor  Limit foods high in added salt, such as the following:    ? Seasonings made with salt, such as garlic salt, celery salt, onion salt, seasoned salt, meat tenderizers, and monosodium glutamate (MSG)    ? Miso soup and canned or dried soup mixes    ? Regular soy sauce, barbecue sauce, teriyaki sauce, steak sauce, Worcestershire sauce, and most flavored vinegars    ? Snack foods, such as salted chips, popcorn, pretzels, pork rinds, salted crackers, and salted nuts    ? Frozen foods, such as dinners, entrees, vegetables with sauces, and breaded meats    · Ask about salt substitutes  Ask your healthcare provider if you may use salt substitutes  Some salt substitutes have ingredients that can be harmful if you have certain health conditions  · Choose foods carefully at restaurants  Meals from restaurants, especially fast food restaurants, are often high in sodium  Some restaurants have nutrition information that tells you the amount of sodium in their foods  Ask to have your food prepared with less, or no salt  What you need to know about fats:  Healthy fats include unsaturated fats and omega-3 fatty acids  Unhealthy fats include saturated fats and trans fats  · Include healthy fats, such as the following:      ? Cooking oils, such as soybean, canola, olive, or sunflower    ? Fatty fish, such as salmon, tuna, mackerel, or sardines    ? Flaxseed oil or ground flaxseed    ? ½ cup of cooked beans, such as black beans, kidney beans, or sarmiento beans    ? 1½ ounces of low-sodium nuts, such as almonds or walnuts    ? Low-sugar, low-sodium peanut butter    ? Seeds such as jessica seeds or sunflower seeds       · Limit or do not have unhealthy fats, such as the following:      ?  Foods that contain fat from animals, such as fatty meats, whole milk, butter, and cream    ? Shortening, stick margarine, palm oil, and coconut oil    ? Full-fat or creamy salad dressing    ? Creamy soup    ? Crackers, chips, and baked goods made with margarine or shortening    ? Foods that are fried in unhealthy fats    ? Gravy and sauces, such as Gerhard or cheese sauces    What you need to know about carbohydrates (carbs): All carbs break down into sugar  Complex carbs contain more fiber than simple carbs  This means complex carbs go into the bloodstream more slowly and cause less of a blood sugar spike  Try to include more complex carbs and fewer simple carbs  · Include complex carbs, such as the following:      ? 1 slice of whole-grain bread    ? 1 ounce of dry cereal that does not contain added sugar    ? ½ cup of cooked oatmeal    ? 2 ounces of cooked whole-grain pasta    ? ½ cup of cooked brown rice    · Limit or do not have simple carbs, such as the following:      ? Baked goods, such as doughnuts, pastries, and cookies    ? Mixes for cornbread and biscuits    ? White rice and pasta mixes, such as boxed macaroni and cheese    ? Instant and cold cereals that contain sugar    ? Jelly, jam, and ice cream that contain sugar    ? Condiments such as ketchup    ? Drinks high in sugar, such as soft drinks, lemonade, and fruit juice    What you need to know about vegetables and fruits:  Vegetables and fruits can be fresh, frozen, or canned  If possible, try to choose low-sodium canned options  · Include a variety of vegetables and fruits, such as the following:      ? 1 medium apple, pear, or peach (about ½ cup chopped)    ? ½ small banana    ? ½ cup berries, such as blueberries, strawberries, or blackberries    ? 1 cup of raw leafy greens, such as lettuce, spinach, kale, or billy greens    ? ½ cup of frozen or canned (no added salt) vegetables, such as green beans    ?  ½ cup of fresh, frozen, or canned fruit (canned in light syrup or fruit juice)    ? ½ cup of vegetable or fruit juice    · Limit or do not have vegetables and fruits made in the following ways:      ? Frozen fruit such as cherries that have added sugar    ? Fruit in cream or butter sauce    ? Canned vegetables that are high in sodium    ? Sauerkraut, pickled vegetables, and other foods prepared in brine    ? Fried vegetables or vegetables in butter or high-fat sauces    What you need to know about protein foods:   · Include lean or low-fat protein foods, such as the following:      ? Poultry (chicken, turkey) with no skin    ? Fish (especially fatty fish, such as salmon, fresh tuna, or mackerel)    ? Lean beef and pork (loin, round, extra lean hamburger)    ? Egg whites and egg substitutes    ? 1 cup of nonfat (skim) or 1% milk    ? 1½ ounces of fat-free or low-fat cheese    ? 6 ounces of nonfat or low-fat yogurt    · Limit or do not have high-fat protein foods, such as the following:      ? Smoked or cured meat, such as corned beef, henson, ham, hot dogs, and sausage    ? Canned beans and canned meats or spreads, such as potted meats, sardines, anchovies, and imitation seafood    ? Deli or lunch meats, such as bologna, ham, turkey, and roast beef    ? High-fat meat (T-bone steak, regular hamburger, and ribs)    ? Whole eggs and egg yolks    ? Whole milk, 2% milk, and cream    ? Regular cheese and processed cheese    Other guidelines to follow:   · Maintain a healthy weight  Your risk for heart disease is higher if you are overweight  Your healthcare provider may suggest that you lose weight if you are overweight  You can lose weight by eating fewer calories and foods that have added sugars and fat  The DASH meal plan can help you do this  Decrease calories by eating smaller portions at each meal and fewer snacks  Ask your healthcare provider for more information about how to lose weight  · Exercise regularly  Regular exercise can help you reach or maintain a healthy weight  Regular exercise can also help decrease your blood pressure and improve your cholesterol levels  Get 30 minutes or more of moderate exercise each day of the week  To lose weight, get at least 60 minutes of exercise  Talk to your healthcare provider about the best exercise program for you  · Limit alcohol  Women should limit alcohol to 1 drink a day  Men should limit alcohol to 2 drinks a day  A drink of alcohol is 12 ounces of beer, 5 ounces of wine, or 1½ ounces of liquor  For more information:   · [facility] and [address]  [address] [address]  [place] [address]  Phone: 8- 799 - [phone]  Web Address: [facility] no    © [facility] [facility] 2022 Information is for End User's use only and may not be sold, redistributed or otherwise used for commercial purposes  All illustrations and images included in CareNotes® are the copyrighted property of A D A M , Inc  or [facility]  The above information is an  only  It is not intended as medical advice for individual conditions or treatments  Talk to your doctor, nurse or pharmacist before following any medical regimen to see if it is safe and effective for you  Hypertension, Ambulatory Care   GENERAL INFORMATION:   Hypertension  is high blood pressure (BP)  Your BP is the force of your blood moving against the walls of your arteries  Hypertension is a BP of 140/90 or higher  Hypertension causes your BP to get so high that your heart has to work much harder than normal  This can cause damage to your heart    Common symptoms include the following:   · Headache     · Blurred vision     · Chest pain     · Dizziness or weakness     · Trouble breathing    · Nosebleeds  Seek immediate care for the following symptoms:   · Severe headache or vision loss    · Weakness in an arm or leg    · Confusion or difficulty speaking    · Discomfort in your chest that feels like squeezing, pressure, fullness, or pain    · Suddenly feeling lightheaded or trouble breathing    · Pain or discomfort in your back, neck, jaw, stomach, or arm  Treatment for hypertension  may include medicine to lower your BP  You may also need to make lifestyle changes  Take your medicine exactly as directed  Manage hypertension:   · Take your BP at home  Sit and rest for 5 minutes before you take your BP  Extend your arm and support it on a flat surface  Your arm should be at the same level as your heart  Follow the directions that came with your BP monitor  If possible, take at least 2 BP readings each time  Take your BP at least twice a day at the same times each day, such as morning and evening  Keep a log of your BP readings and bring it to your follow-up visits  · Eat less sodium (salt)  Do not add sodium to your food  Limit foods that are high in sodium, such as canned foods, potato chips, and cold cuts  Your healthcare provider may suggest that you follow the [address] [place] [address] Street  The plan is low in sodium, unhealthy fats, and total fat  It is high in potassium, calcium, and fiber  · Exercise regularly  Exercise at least 30 minutes per day, on most days of the week  This will help decrease your BP  Ask your healthcare provider about the best exercise plan for you  · Limit alcohol  Women should limit alcohol to 1 drink a day  Men should limit alcohol to 2 drinks a day  A drink of alcohol is 12 ounces of beer, 5 ounces of wine, or 1½ ounces of liquor  · Do not smoke  If you smoke, it is never too late to quit  Smoking can increase your BP  Smoking also worsens other health conditions you may have that can increase your risk for hypertension  Ask your healthcare provider for information if you need help quitting  Follow up with your healthcare provider as directed: You will need to return to have your BP checked and to have other lab tests done   Write down your questions so you remember to ask them during your visits  CARE AGREEMENT:   You have the right to help plan your care  Learn about your health condition and how it may be treated  Discuss treatment options with your caregivers to decide what care you want to receive  You always have the right to refuse treatment  The above information is an  only  It is not intended as medical advice for individual conditions or treatments  Talk to your doctor, nurse or pharmacist before following any medical regimen to see if it is safe and effective for you  © 2014 [address] is for End User's use only and may not be sold, redistributed or otherwise used for commercial purposes  All illustrations and images included in CareNotes® are the copyrighted property of A D A M , Inc  or [facility]

## 2022-03-18 NOTE — ASSESSMENT & PLAN NOTE
· Current /90, Goal BP <140/90/<150/90 (Age >60) per [facility] 8 guidelines uncontrolled   Current Home Medications: None   Statin currently used: None , ASCVD Risk-7 3%   Educated and counseled on the importance of Lifestyle changes including smoking cessation, controlling blood sugar and lipids, DASH diet, reducing sodium intake to no more than 2,400 mg/day and on moderate physical activity 3-4 days per week averaging 40 minutes per session  Monitor excessive caffeine intake,    Red flag symptoms reviewed with patient including chest pain, SOB, changes in vision, vomiting, headaches, paresthesias and ER precautions given if these develop   Encourage to check BP's at least 4x per week  Always take it in the left arm, while seated, and after waiting for at least 5 minutes  Record BP readings in blood pressure logs provided   EKG see below-no acute ST-T wave changes    Fasting metabolic panel with eGFR to evaluate for renal insufficiency, hyperglycemia, hypokalemia, hyperuricemia, or hypercalcemia   Fasting lipid panel to evaluate for high LDL, low HDL, or high triglycerides   Hemoglobin level to evaluate for anemia or polycythemia, which may suggest secondary cause of complication   TSH to evaluate for high or low if thyroid dysfunction   ER precautions given     RTC in 3-4 weeks for evaluation of blood pressure logs

## 2022-03-22 ENCOUNTER — APPOINTMENT (OUTPATIENT)
Dept: LAB | Facility: CLINIC | Age: 57
End: 2022-03-22
Payer: COMMERCIAL

## 2022-03-22 DIAGNOSIS — R03.0 ELEVATED BLOOD PRESSURE READING: ICD-10-CM

## 2022-03-22 LAB
ALBUMIN SERPL BCP-MCNC: 4.2 G/DL (ref 3.5–5)
ALP SERPL-CCNC: 95 U/L (ref 46–116)
ALT SERPL W P-5'-P-CCNC: 23 U/L (ref 12–78)
ANION GAP SERPL CALCULATED.3IONS-SCNC: 5 MMOL/L (ref 4–13)
AST SERPL W P-5'-P-CCNC: 18 U/L (ref 5–45)
BASOPHILS # BLD AUTO: 0.03 THOUSANDS/ΜL (ref 0–0.1)
BASOPHILS NFR BLD AUTO: 1 % (ref 0–1)
BILIRUB SERPL-MCNC: 0.47 MG/DL (ref 0.2–1)
BUN SERPL-MCNC: 10 MG/DL (ref 5–25)
CALCIUM SERPL-MCNC: 9.2 MG/DL (ref 8.3–10.1)
CHLORIDE SERPL-SCNC: 110 MMOL/L (ref 100–108)
CHOLEST SERPL-MCNC: 182 MG/DL
CO2 SERPL-SCNC: 26 MMOL/L (ref 21–32)
CREAT SERPL-MCNC: 0.85 MG/DL (ref 0.6–1.3)
EOSINOPHIL # BLD AUTO: 0.15 THOUSAND/ΜL (ref 0–0.61)
EOSINOPHIL NFR BLD AUTO: 3 % (ref 0–6)
ERYTHROCYTE [DISTWIDTH] IN BLOOD BY AUTOMATED COUNT: 15.2 % (ref 11.6–15.1)
EST. AVERAGE GLUCOSE BLD GHB EST-MCNC: 117 MG/DL
GFR SERPL CREATININE-BSD FRML MDRD: 77 ML/MIN/1.73SQ M
GLUCOSE P FAST SERPL-MCNC: 94 MG/DL (ref 65–99)
HBA1C MFR BLD: 5.7 %
HCT VFR BLD AUTO: 39.2 % (ref 34.8–46.1)
HDLC SERPL-MCNC: 53 MG/DL
HGB BLD-MCNC: 12.1 G/DL (ref 11.5–15.4)
IMM GRANULOCYTES # BLD AUTO: 0.01 THOUSAND/UL (ref 0–0.2)
IMM GRANULOCYTES NFR BLD AUTO: 0 % (ref 0–2)
LDLC SERPL CALC-MCNC: 107 MG/DL (ref 0–100)
LYMPHOCYTES # BLD AUTO: 2.18 THOUSANDS/ΜL (ref 0.6–4.47)
LYMPHOCYTES NFR BLD AUTO: 42 % (ref 14–44)
MCH RBC QN AUTO: 25.7 PG (ref 26.8–34.3)
MCHC RBC AUTO-ENTMCNC: 30.9 G/DL (ref 31.4–37.4)
MCV RBC AUTO: 83 FL (ref 82–98)
MONOCYTES # BLD AUTO: 0.32 THOUSAND/ΜL (ref 0.17–1.22)
MONOCYTES NFR BLD AUTO: 6 % (ref 4–12)
NEUTROPHILS # BLD AUTO: 2.45 THOUSANDS/ΜL (ref 1.85–7.62)
NEUTS SEG NFR BLD AUTO: 48 % (ref 43–75)
NRBC BLD AUTO-RTO: 0 /100 WBCS
PLATELET # BLD AUTO: 349 THOUSANDS/UL (ref 149–390)
PMV BLD AUTO: 11.1 FL (ref 8.9–12.7)
POTASSIUM SERPL-SCNC: 3.6 MMOL/L (ref 3.5–5.3)
PROT SERPL-MCNC: 7.9 G/DL (ref 6.4–8.2)
RBC # BLD AUTO: 4.7 MILLION/UL (ref 3.81–5.12)
SODIUM SERPL-SCNC: 141 MMOL/L (ref 136–145)
TRIGL SERPL-MCNC: 108 MG/DL
TSH SERPL DL<=0.05 MIU/L-ACNC: 1.46 UIU/ML (ref 0.36–3.74)
WBC # BLD AUTO: 5.14 THOUSAND/UL (ref 4.31–10.16)

## 2022-03-22 PROCEDURE — 36415 COLL VENOUS BLD VENIPUNCTURE: CPT

## 2022-03-22 PROCEDURE — 80061 LIPID PANEL: CPT

## 2022-03-22 PROCEDURE — 84443 ASSAY THYROID STIM HORMONE: CPT

## 2022-03-22 PROCEDURE — 80053 COMPREHEN METABOLIC PANEL: CPT

## 2022-03-22 PROCEDURE — 85025 COMPLETE CBC W/AUTO DIFF WBC: CPT

## 2022-03-22 PROCEDURE — 83036 HEMOGLOBIN GLYCOSYLATED A1C: CPT

## 2022-04-07 ENCOUNTER — HOSPITAL ENCOUNTER (OUTPATIENT)
Dept: PULMONOLOGY | Facility: HOSPITAL | Age: 57
Discharge: HOME | End: 2022-04-07
Attending: INTERNAL MEDICINE
Payer: COMMERCIAL

## 2022-04-07 DIAGNOSIS — U09.9 POST-COVID CHRONIC DYSPNEA: ICD-10-CM

## 2022-04-07 DIAGNOSIS — R06.09 POST-COVID CHRONIC DYSPNEA: ICD-10-CM

## 2022-04-07 DIAGNOSIS — J45.20 MILD INTERMITTENT ASTHMA WITHOUT COMPLICATION: ICD-10-CM

## 2022-04-07 PROCEDURE — 94729 DIFFUSING CAPACITY: CPT

## 2022-04-07 PROCEDURE — 94729 DIFFUSING CAPACITY: CPT | Performed by: INTERNAL MEDICINE

## 2022-04-07 PROCEDURE — 94010 BREATHING CAPACITY TEST: CPT

## 2022-04-07 PROCEDURE — 94760 N-INVAS EAR/PLS OXIMETRY 1: CPT

## 2022-04-07 PROCEDURE — 94726 PLETHYSMOGRAPHY LUNG VOLUMES: CPT | Performed by: INTERNAL MEDICINE

## 2022-04-07 PROCEDURE — 94010 BREATHING CAPACITY TEST: CPT | Performed by: INTERNAL MEDICINE

## 2022-04-07 PROCEDURE — 94726 PLETHYSMOGRAPHY LUNG VOLUMES: CPT

## 2022-04-07 RX ORDER — ALBUTEROL SULFATE 2.5 MG/3ML
2.5 SOLUTION RESPIRATORY (INHALATION) ONCE AS NEEDED
Status: DISCONTINUED | OUTPATIENT
Start: 2022-04-07 | End: 2022-04-11 | Stop reason: HOSPADM

## 2022-04-13 ENCOUNTER — OFFICE VISIT (OUTPATIENT)
Dept: FAMILY MEDICINE CLINIC | Facility: CLINIC | Age: 57
End: 2022-04-13

## 2022-04-13 VITALS
WEIGHT: 165 LBS | DIASTOLIC BLOOD PRESSURE: 86 MMHG | BODY MASS INDEX: 32.39 KG/M2 | OXYGEN SATURATION: 99 % | HEIGHT: 60 IN | HEART RATE: 73 BPM | SYSTOLIC BLOOD PRESSURE: 152 MMHG | RESPIRATION RATE: 18 BRPM | TEMPERATURE: 96.5 F

## 2022-04-13 DIAGNOSIS — R73.03 PREDIABETES: ICD-10-CM

## 2022-04-13 DIAGNOSIS — K21.9 GASTROESOPHAGEAL REFLUX DISEASE, UNSPECIFIED WHETHER ESOPHAGITIS PRESENT: ICD-10-CM

## 2022-04-13 DIAGNOSIS — J45.20 MILD INTERMITTENT ASTHMA WITHOUT COMPLICATION: ICD-10-CM

## 2022-04-13 DIAGNOSIS — K21.9 GASTROESOPHAGEAL REFLUX DISEASE: ICD-10-CM

## 2022-04-13 DIAGNOSIS — R03.0 ELEVATED BLOOD PRESSURE READING: Primary | ICD-10-CM

## 2022-04-13 PROBLEM — Z00.00 HEALTHCARE MAINTENANCE: Status: ACTIVE | Noted: 2022-04-13

## 2022-04-13 PROBLEM — E55.9 VITAMIN D DEFICIENCY: Status: RESOLVED | Noted: 2017-08-01 | Resolved: 2022-04-13

## 2022-04-13 PROCEDURE — 99214 OFFICE O/P EST MOD 30 MIN: CPT | Performed by: FAMILY MEDICINE

## 2022-04-13 PROCEDURE — 1036F TOBACCO NON-USER: CPT | Performed by: FAMILY MEDICINE

## 2022-04-13 RX ORDER — FAMOTIDINE 20 MG/1
20 TABLET, FILM COATED ORAL 2 TIMES DAILY
Qty: 90 TABLET | Refills: 1 | Status: SHIPPED | OUTPATIENT
Start: 2022-04-13

## 2022-04-13 RX ORDER — ALBUTEROL SULFATE 90 UG/1
2 AEROSOL, METERED RESPIRATORY (INHALATION) EVERY 6 HOURS PRN
Qty: 18 G | Refills: 5 | Status: SHIPPED | OUTPATIENT
Start: 2022-04-13

## 2022-04-13 NOTE — PROGRESS NOTES
[facility]   [address] [facility], [address] [facility]  Phone#  [phone]  Fax#  [phone]    ASSESSMENT and PLAN  Elevated blood pressure reading  · Current /85  · Statin currently used: None , ASCVD Risk-5 5%   Blood pressure readings majority < 140/90   EKG -no acute ST-T wave changes    eGFR 77-mild stage 2 kidney disease   Extensive diet and exercise education given   Hold off blood pressure medication for now to gather more data and allow for lifestyle changes to take place   Will follow up in July for annual physical and at that time decide if we need to start medication      GERD (gastroesophageal reflux disease)  · EGD normal 2020  · Completed Omeprazole 20 mg qd x 8 weeks  · Would like to continue Pepcid 20 mg BID      Asthma  · Controlled  · Continue Albuterol 2 puff q6 hrs prn    Healthcare maintenance  · Discussed risks/benefits COVID vaccine; contemplating at this time  · Cervical Cancer screening UTD  · Colonoscopy UTD  · Will need repeat mammogram    Prediabetes  · A1C-5 7  · Continue to monitor  · Dietary habits and exercise discussed    Diagnoses and all orders for this visit:    Elevated blood pressure reading    Gastroesophageal reflux disease  -     famotidine (PEPCID) 20 mg tablet; Take 1 tablet (20 mg total) by mouth 2 (two) times a day    Mild intermittent asthma without complication  -     albuterol (Ventolin HFA) 90 mcg/act inhaler; Inhale 2 puffs every 6 (six) hours as needed for wheezing    Gastroesophageal reflux disease, unspecified whether esophagitis present    Prediabetes      HISTORY OF PRESENT ILLNESS  Joann Michaud is a very pleasant 55 y o  female with a significant PMHx of GERD ,asthma,COVID-19 ,vitamin-D deficiency that presents today for follow up of blood work and for   elevated blood pressure  She states the she had COVID in December of 2021 and has been suffering symptoms and is currently seeing Pulm   Harleen denies any cigarette smoking, diabetes history, or family history of CAD  Reviewed all blood work with patient and blood pressure logs  REVIEW OF SYSTEMS  Review of Systems   Constitutional: Negative for chills, fatigue and fever  HENT: Negative for sore throat  Respiratory: Negative for cough, chest tightness and shortness of breath  Cardiovascular: Negative for chest pain and leg swelling  Gastrointestinal: Negative for constipation, diarrhea, nausea and vomiting  Endocrine: Negative for polydipsia, polyphagia and polyuria  Genitourinary: Negative for dysuria  Musculoskeletal: Negative for arthralgias  Skin: Negative for rash  Neurological: Negative for dizziness, light-headedness and headaches  Psychiatric/Behavioral: Negative for agitation and behavioral problems  PAST MEDICAL HISTORY   Past Medical History:   Diagnosis Date    Asthma     GERD (gastroesophageal reflux disease)     Vitamin D deficiency 8/1/2017     Past Surgical History:   Procedure Laterality Date    NO PAST SURGERIES       Social History     Socioeconomic History    Marital status: /Civil Union     Spouse name: Not on file    Number of children: Not on file    Years of education: Not on file    Highest education level: Not on file   Occupational History    Not on file   Tobacco Use    Smoking status: Never Smoker    Smokeless tobacco: Never Used   Vaping Use    Vaping Use: Never used   Substance and Sexual Activity    Alcohol use:  Yes    Drug use: Never    Sexual activity: Yes     Partners: Male     Birth control/protection: Post-menopausal   Other Topics Concern    Not on file   Social History Narrative    No preference on Congregational beliefs      Social Determinants of Health     Financial Resource Strain: Low Risk     Difficulty of Paying Living Expenses: Not hard at all   Food Insecurity: No Food Insecurity    Worried About [address] [facility] in the Last Year: Never true    Barb of Food in the Last Year: Never true   Transportation Needs: No Transportation Needs    Lack of Transportation (Medical): No    Lack of Transportation (Non-Medical): No   Physical Activity: Not on file   Stress: No Stress Concern Present    Feeling of Stress :  Only a little   Social Connections: Not on file   Intimate Partner Violence: Not on file   Housing Stability: Unknown    Unable to Pay for Housing in the Last Year: No    Number of Places Lived in the Last Year: Not on file    Unstable Housing in the Last Year: No     Family History   Problem Relation Age of Onset    No Known Problems Mother     No Known Problems Father     Dementia Maternal Grandmother         Without behavioral disturbance, unspecified dementia type     Heart failure Maternal Grandmother     Hypertension Maternal Grandmother     Diabetes type II Maternal Grandmother     Breast cancer Maternal Aunt         62    No Known Problems Daughter     No Known Problems Paternal Grandfather     Breast cancer Maternal Aunt         62s    No Known Problems Maternal Aunt     Colon cancer Neg Hx     Ovarian cancer Neg Hx      MEDICATIONS    Current Outpatient Medications:     albuterol (Ventolin HFA) 90 mcg/act inhaler, Inhale 2 puffs every 6 (six) hours as needed for wheezing, Disp: 18 g, Rfl: 5    famotidine (PEPCID) 20 mg tablet, Take 1 tablet (20 mg total) by mouth 2 (two) times a day, Disp: 90 tablet, Rfl: 1    Vitamin D, Ergocalciferol, 2000 units CAPS, ergocalciferol (vitamin D2) 50,000 unit capsule (Patient not taking: Reported on 1/20/2022), Disp: , Rfl:     PHYSICAL EXAM  Vitals:    04/13/22 1521   BP: 152/86   BP Location: Left arm   Patient Position: Sitting   Cuff Size: Adult   Pulse: 73   Resp: 18   Temp: (!) 96 5 °F (35 8 °C)   TempSrc: Temporal   SpO2: 99%   Weight: 74 8 kg (165 lb)   Height: 5' (1 524 m)     Wt Readings from Last 3 Encounters:   04/13/22 74 8 kg (165 lb)   03/18/22 75 8 kg (167 lb)   01/20/22 75 6 kg (166 lb 9 6 oz)    , Body mass index is 32 22 kg/m²  Physical Exam  Constitutional:       Appearance: She is well-developed  HENT:      Head: Normocephalic and atraumatic  Eyes:      Pupils: Pupils are equal, round, and reactive to light  Cardiovascular:      Rate and Rhythm: Normal rate and regular rhythm  Heart sounds: Normal heart sounds  Pulmonary:      Effort: Pulmonary effort is normal       Breath sounds: Normal breath sounds  Abdominal:      General: Bowel sounds are normal       Palpations: Abdomen is soft  Musculoskeletal:         General: Normal range of motion  Cervical back: Normal range of motion and neck supple  Skin:     General: Skin is warm  Findings: No erythema or rash  Neurological:      Mental Status: She is alert and oriented to person, place, and time  Psychiatric:         Behavior: Behavior normal        LABS/IMAGING  Hemoglobin A1C   Date Value Ref Range Status   03/22/2022 5 7 (H) Normal 3 8-5 6%; PreDiabetic 5 7-6 4%; Diabetic >=6 5%; Glycemic control for adults with diabetes <7 0% % Final     HDL, Direct   Date Value Ref Range Status   03/22/2022 53 >=50 mg/dL Final     Comment:     Specimen collection should occur prior to Metamizole administration due to the potential for falsley depressed results  Triglycerides   Date Value Ref Range Status   03/22/2022 108 See Comment mg/dL Final     Comment:     Triglyceride:     0-9Y            <75mg/dL     10Y-17Y         <90 mg/dL       >=18Y     Normal          <150 mg/dL     Borderline High 150-199 mg/dL     High            200-499 mg/dL        Very High       >499 mg/dL    Specimen collection should occur prior to N-Acetylcysteine or Metamizole administration due to the potential for falsely depressed results        WBC   Date Value Ref Range Status   03/22/2022 5 14 4 31 - 10 16 Thousand/uL Final   08/01/2017 6 04 4 31 - 10 16 Thousand/uL Final   10/14/2014 6 16 4 31 - 10 16 Thousand/uL Final Hemoglobin   Date Value Ref Range Status   03/22/2022 12 1 11 5 - 15 4 g/dL Final   08/01/2017 11 7 11 5 - 15 4 g/dL Final   10/14/2014 12 4 11 5 - 15 4 g/dL Final     Platelets   Date Value Ref Range Status   03/22/2022 349 149 - 390 Thousands/uL Final   08/01/2017 338 149 - 390 Thousands/uL Final   10/14/2014 322 149 - 390 Thousand/uL Final     Potassium   Date Value Ref Range Status   03/22/2022 3 6 3 5 - 5 3 mmol/L Final   07/07/2021 3 9 3 5 - 5 3 mmol/L Final   06/08/2020 4 2 3 5 - 5 3 mmol/L Final   10/14/2014 3 9 3 6 - 5 0 mmol/L Final     Chloride   Date Value Ref Range Status   03/22/2022 110 (H) 100 - 108 mmol/L Final   07/07/2021 106 100 - 108 mmol/L Final   06/08/2020 109 (H) 100 - 108 mmol/L Final   10/14/2014 105 101 - 111 mmol/L Final     CO2   Date Value Ref Range Status   03/22/2022 26 21 - 32 mmol/L Final   07/07/2021 27 21 - 32 mmol/L Final   06/08/2020 26 21 - 32 mmol/L Final   10/14/2014 25 21 - 31 mmol/L Final     Anion Gap   Date Value Ref Range Status   10/14/2014 12 4 - 13 mmol/L Final     BUN   Date Value Ref Range Status   03/22/2022 10 5 - 25 mg/dL Final   07/07/2021 15 5 - 25 mg/dL Final   06/08/2020 13 5 - 25 mg/dL Final   10/14/2014 11 5 - 27 mg/dL Final     Creatinine   Date Value Ref Range Status   03/22/2022 0 85 0 60 - 1 30 mg/dL Final     Comment:     Standardized to IDMS reference method   07/07/2021 0 91 0 60 - 1 30 mg/dL Final     Comment:     Standardized to IDMS reference method   06/08/2020 0 83 0 60 - 1 30 mg/dL Final     Comment:     Standardized to IDMS reference method   10/14/2014 0 85 0 60 - 1 30 mg/dL Final     Comment:     Standardized to IDMS reference method     eGFR   Date Value Ref Range Status   03/22/2022 77 ml/min/1 73sq m Final   07/07/2021 82 ml/min/1 73sq m Final   06/08/2020 93 ml/min/1 73sq m Final     Glucose   Date Value Ref Range Status   10/14/2014 106 65 - 140 mg/dL Final     Comment:     If patient is fasting, the ADA then defines impaired fasting glucose as  >100 mg/dl and diabetes as  >or equal to 126 mg/dl  Calcium   Date Value Ref Range Status   03/22/2022 9 2 8 3 - 10 1 mg/dL Final   07/07/2021 9 4 8 3 - 10 1 mg/dL Final   06/08/2020 8 9 8 3 - 10 1 mg/dL Final   10/14/2014 9 2 8 3 - 10 1 mg/dL Final     AST   Date Value Ref Range Status   03/22/2022 18 5 - 45 U/L Final     Comment:     Specimen collection should occur prior to Sulfasalazine administration due to the potential for falsely depressed results  07/07/2021 24 5 - 45 U/L Final     Comment:     Specimen collection should occur prior to Sulfasalazine administration due to the potential for falsely depressed results  06/08/2020 18 5 - 45 U/L Final     Comment:       Specimen collection should occur prior to Sulfasalazine administration due to the potential for falsely depressed results  10/14/2014 22 10 - 42 U/L Final     ALT   Date Value Ref Range Status   03/22/2022 23 12 - 78 U/L Final     Comment:     Specimen collection should occur prior to Sulfasalazine and/or Sulfapyridine administration due to the potential for falsely depressed results  07/07/2021 28 12 - 78 U/L Final     Comment:     Specimen collection should occur prior to Sulfasalazine and/or Sulfapyridine administration due to the potential for falsely depressed results  06/08/2020 25 12 - 78 U/L Final     Comment:       Specimen collection should occur prior to Sulfasalazine and/or Sulfapyridine administration due to the potential for falsely depressed results      10/14/2014 24 6 - 78 U/L Final     Alkaline Phosphatase   Date Value Ref Range Status   03/22/2022 95 46 - 116 U/L Final   07/07/2021 120 (H) 46 - 116 U/L Final   06/08/2020 98 46 - 116 U/L Final   10/14/2014 122 (H) 42 - 121 U/L Final     Total Protein   Date Value Ref Range Status   10/14/2014 8 5 (H) 6 4 - 8 2 g/dL Final     Total Bilirubin   Date Value Ref Range Status   10/14/2014 0 3 0 2 - 1 0 mg/dL Final     No results found for: TSH    This note has been dictated using [employer] software  It may contain errors, including improperly dictated words  Please contact physician directly for any questions       Eric Moon MD   PGY-2

## 2022-04-13 NOTE — ASSESSMENT & PLAN NOTE
· Current /85  · Statin currently used: None , ASCVD Risk-5 5%   Blood pressure readings majority < 140/90      EKG -no acute ST-T wave changes    eGFR 77-mild stage 2 kidney disease   Extensive diet and exercise education given   Hold off blood pressure medication for now to gather more data and allow for lifestyle changes to take place   Will follow up in July for annual physical and at that time decide if we need to start medication

## 2022-04-13 NOTE — ASSESSMENT & PLAN NOTE
· Discussed risks/benefits COVID vaccine; contemplating at this time  · Cervical Cancer screening UTD  · Colonoscopy UTD  · Will need repeat mammogram

## 2022-04-13 NOTE — ASSESSMENT & PLAN NOTE
· EGD normal 2020  · Completed Omeprazole 20 mg qd x 8 weeks  · Would like to continue Pepcid 20 mg BID

## 2022-04-18 ENCOUNTER — OFFICE VISIT (OUTPATIENT)
Dept: PULMONOLOGY | Facility: CLINIC | Age: 57
End: 2022-04-18
Payer: COMMERCIAL

## 2022-04-18 VITALS
HEART RATE: 70 BPM | DIASTOLIC BLOOD PRESSURE: 80 MMHG | OXYGEN SATURATION: 100 % | BODY MASS INDEX: 31.53 KG/M2 | HEIGHT: 61 IN | TEMPERATURE: 97.5 F | SYSTOLIC BLOOD PRESSURE: 140 MMHG | WEIGHT: 167 LBS | RESPIRATION RATE: 18 BRPM

## 2022-04-18 DIAGNOSIS — R06.09 POST-COVID CHRONIC DYSPNEA: ICD-10-CM

## 2022-04-18 DIAGNOSIS — E66.09 CLASS 1 OBESITY DUE TO EXCESS CALORIES WITHOUT SERIOUS COMORBIDITY WITH BODY MASS INDEX (BMI) OF 32.0 TO 32.9 IN ADULT: ICD-10-CM

## 2022-04-18 DIAGNOSIS — J45.20 MILD INTERMITTENT ASTHMA WITHOUT COMPLICATION: Primary | ICD-10-CM

## 2022-04-18 DIAGNOSIS — U09.9 POST-COVID CHRONIC DYSPNEA: ICD-10-CM

## 2022-04-18 PROCEDURE — 3008F BODY MASS INDEX DOCD: CPT | Performed by: INTERNAL MEDICINE

## 2022-04-18 PROCEDURE — 3008F BODY MASS INDEX DOCD: CPT | Performed by: FAMILY MEDICINE

## 2022-04-18 PROCEDURE — 1036F TOBACCO NON-USER: CPT | Performed by: INTERNAL MEDICINE

## 2022-04-18 PROCEDURE — 99213 OFFICE O/P EST LOW 20 MIN: CPT | Performed by: INTERNAL MEDICINE

## 2022-04-18 NOTE — PROGRESS NOTES
Office Progress Note - Pulmonary    [facility] 55 y o  female MRN: [id number]    Encounter: [id number]      Assessment:   Bronchial asthma   Post COVID infection   Obesity  Plan:     Albuterol rescue inhaler 2 inhalations 4 times a day as needed   Weight loss   Follow-up as needed  Discussion:   The patient's COVID infection has resolved  Her PFTs were normal   I have reassured the patient  The bronchial asthma is well controlled  I have maintained her on the albuterol rescue inhaler 2 inhalations 4 times a day as needed  Advised her to lose weight  I have not scheduled her for another appointment however I will be happy to see her in the future if the need arises  Subjective: The patient is here for a follow-up visit  She denies shortness of breath  She has no cough, wheezing or sputum production  No nocturnal symptoms  She rarely needs to use her rescue inhaler  Review of systems:  A 12 point system review is done and aside from what is stated above the rest of the review of systems is negative  Family history and social history are reviewed  Medications list is reviewed  Vitals:  Pulse 17 beats per minute, respiratory rate 18 breaths per minute, temperature 97 5°, blood pressure 140/80 and O2 saturation 100% on room air  Weight 167 lb and height 5'1"  Physical Exam  Gen: Awake, alert, oriented x 3, no acute distress  HEENT: Mucous membranes moist, no oral lesions, no thrush  NECK: No accessory muscle use, JVP not elevated  Cardiac: Regular, single S1, single S2, no murmurs, no rubs, no gallops  Lungs:  Clear breath sounds  No wheezing or rhonchi  Abdomen: normoactive bowel sounds, soft nontender, nondistended, no rebound or rigidity, no guarding  Extremities: no cyanosis, no clubbing, no edema  Neuro:  Grossly nonfocal   Skin:  No rash  Complete pulmonary function test is reviewed  It showed normal lung volumes    Normal airflow on vital capacity  Normal diffusion capacity  Normal airway resistance as indicated by the specific airway conductance      Lab Results   Component Value Date    WBC 5 14 03/22/2022    HGB 12 1 03/22/2022    HCT 39 2 03/22/2022    MCV 83 03/22/2022     03/22/2022     Lab Results   Component Value Date    SODIUM 141 03/22/2022    K 3 6 03/22/2022     (H) 03/22/2022    CO2 26 03/22/2022    BUN 10 03/22/2022    CREATININE 0 85 03/22/2022    CALCIUM 9 2 03/22/2022

## 2022-04-18 NOTE — LETTER
April 18, 2022     Patient: Juan Arroyo [place]  YOB: 1965  Date of Visit: 4/18/2022      To Whom it May Concern:    Brendan Silva is under my professional care  Keiry Narvaez was seen in my office on 4/18/2022  If you have any questions or concerns, please don't hesitate to call           Sincerely,          Jennifer Mixon MD        CC: No Recipients

## 2022-05-24 ENCOUNTER — TELEPHONE (OUTPATIENT)
Dept: FAMILY MEDICINE CLINIC | Facility: CLINIC | Age: 57
End: 2022-05-24

## 2022-05-24 NOTE — TELEPHONE ENCOUNTER
No, we cannot use a same day slot for that  He could use one of his private slots if he wishes but it's not meet same day visit criteria  I would just have her submit the forms as usual and as long as she's requesting it for a reason we've been seeing her for, the provider should be able to complete per forms protocol

## 2022-05-24 NOTE — TELEPHONE ENCOUNTER
Pt would like to know if she would be able to have a sooner appt that I have already scheduled her on 6/27/22 for fmla discussion  Pt was informed that this could be done at a same day visit the day Dr Steph Torres has same day schedule  Please Advice  I have informed pt that if provider or nurse reply back to my message I will be contacting her with the response

## 2022-05-25 ENCOUNTER — OFFICE VISIT (OUTPATIENT)
Dept: FAMILY MEDICINE CLINIC | Facility: CLINIC | Age: 57
End: 2022-05-25

## 2022-05-25 VITALS
WEIGHT: 167 LBS | SYSTOLIC BLOOD PRESSURE: 160 MMHG | BODY MASS INDEX: 31.53 KG/M2 | HEIGHT: 61 IN | RESPIRATION RATE: 16 BRPM | TEMPERATURE: 98.4 F | HEART RATE: 93 BPM | DIASTOLIC BLOOD PRESSURE: 80 MMHG | OXYGEN SATURATION: 98 %

## 2022-05-25 DIAGNOSIS — R53.83 OTHER FATIGUE: ICD-10-CM

## 2022-05-25 DIAGNOSIS — R42 DIZZINESS: Primary | ICD-10-CM

## 2022-05-25 DIAGNOSIS — J45.909 MILD ASTHMA WITHOUT COMPLICATION, UNSPECIFIED WHETHER PERSISTENT: ICD-10-CM

## 2022-05-25 DIAGNOSIS — R03.0 ELEVATED BLOOD PRESSURE READING: ICD-10-CM

## 2022-05-25 LAB
SARS-COV-2 AG UPPER RESP QL IA: NEGATIVE
VALID CONTROL: NORMAL

## 2022-05-25 PROCEDURE — 3008F BODY MASS INDEX DOCD: CPT | Performed by: INTERNAL MEDICINE

## 2022-05-25 PROCEDURE — 3008F BODY MASS INDEX DOCD: CPT | Performed by: FAMILY MEDICINE

## 2022-05-25 PROCEDURE — 87811 SARS-COV-2 COVID19 W/OPTIC: CPT | Performed by: NURSE PRACTITIONER

## 2022-05-25 PROCEDURE — 99214 OFFICE O/P EST MOD 30 MIN: CPT | Performed by: NURSE PRACTITIONER

## 2022-05-25 RX ORDER — PREDNISONE 20 MG/1
40 TABLET ORAL DAILY
Qty: 10 TABLET | Refills: 0 | Status: SHIPPED | OUTPATIENT
Start: 2022-05-25 | End: 2022-05-30

## 2022-05-25 NOTE — LETTER
May 25, 2022     Patient: Priyanka Luna [place]   YOB: 1965   Date of Visit: 5/25/2022       To Whom it May Concern:    Andrea Huizar was seen in my clinic on 5/25/2022  She may return to work on 5/30/2022  If you have any questions or concerns, please don't hesitate to call           Sincerely,        Dixie CARRERA  [facility]        CC: No Recipients

## 2022-05-25 NOTE — ASSESSMENT & PLAN NOTE
Current exacerbation, using inhaler daily   Covid negative in office today   Will treat with prednisone x 5 days

## 2022-05-25 NOTE — PROGRESS NOTES
Assessment/Plan:    Asthma  Current exacerbation, using inhaler daily   Covid negative in office today   Will treat with prednisone x 5 days     Elevated blood pressure reading  BP today 160/80 and on chart review has had other elevated readings, patient reports home readings often ~150/90 - would recommend starting low dose antihypertensive at this time; patient prefers to discuss further with her PCP so will defer to him  Advised patient to check BP twice daily and record for PCP review at next visit  Keiry Narvaez was seen today for asthma, cough and dizziness  Diagnoses and all orders for this visit:    Dizziness  -     POCT Rapid Covid Ag    Other fatigue  -     POCT Rapid Covid Ag    Elevated blood pressure reading    Mild asthma without complication, unspecified whether persistent  -     predniSONE 20 mg tablet; Take 2 tablets (40 mg total) by mouth in the morning for 5 days  Follow up for next scheduled visit       Subjective:     Brendan Silva is a 55 y o  female who  has a past medical history of Asthma, GERD (gastroesophageal reflux disease), and Vitamin D deficiency  who presented to the office today for same day visit  Reports 2 day onset of frontal headache and fatigue  She also has been having episodes of dizziness a few times a week that last for a few seconds and occur usually at rest  She does also c/o of cough and using the albuterol inhaler daily       The following portions of the patient's history were reviewed and updated as appropriate: allergies, current medications, past family history, past medical history, past social history, past surgical history and problem list     Current Outpatient Medications on File Prior to Visit   Medication Sig Dispense Refill    albuterol (Ventolin HFA) 90 mcg/act inhaler Inhale 2 puffs every 6 (six) hours as needed for wheezing 18 g 5    famotidine (PEPCID) 20 mg tablet Take 1 tablet (20 mg total) by mouth 2 (two) times a day 90 tablet 1  Vitamin D, Ergocalciferol, 2000 units CAPS ergocalciferol (vitamin D2) 50,000 unit capsule (Patient not taking: Reported on 1/20/2022)       No current facility-administered medications on file prior to visit  Review of Systems   Constitutional: Negative for chills and fever  HENT: Negative for ear pain and sore throat  Eyes: Negative for pain and visual disturbance  Respiratory: Positive for cough and wheezing  Negative for shortness of breath  Cardiovascular: Negative for chest pain and palpitations  Gastrointestinal: Negative for abdominal pain and vomiting  Genitourinary: Negative for dysuria and hematuria  Musculoskeletal: Negative for arthralgias and back pain  Skin: Negative for color change and rash  Neurological: Positive for dizziness and headaches  Negative for seizures and syncope  All other systems reviewed and are negative  Objective:    /80 (BP Location: Right arm, Patient Position: Sitting, Cuff Size: Standard)   Pulse 93   Temp 98 4 °F (36 9 °C) (Temporal)   Resp 16   Ht 5' 1" (1 549 m)   Wt 75 8 kg (167 lb)   SpO2 98%   Breastfeeding No   BMI 31 55 kg/m²     Physical Exam  Vitals and nursing note reviewed  Constitutional:       General: She is not in acute distress  Appearance: She is well-developed  She is not diaphoretic  HENT:      Head: Normocephalic and atraumatic  Right Ear: Tympanic membrane and external ear normal       Left Ear: Tympanic membrane and external ear normal       Mouth/Throat:      Pharynx: No oropharyngeal exudate or posterior oropharyngeal erythema  Eyes:      Conjunctiva/sclera: Conjunctivae normal       Pupils: Pupils are equal, round, and reactive to light  Cardiovascular:      Rate and Rhythm: Normal rate and regular rhythm  Pulmonary:      Effort: Pulmonary effort is normal  No respiratory distress  Breath sounds: Normal breath sounds  No wheezing     Abdominal:      General: Bowel sounds are normal  There is no distension  Palpations: Abdomen is soft  Tenderness: There is no abdominal tenderness  Musculoskeletal:         General: Normal range of motion  Cervical back: Normal range of motion and neck supple  Lymphadenopathy:      Cervical: No cervical adenopathy  Skin:     General: Skin is warm and dry  Capillary Refill: Capillary refill takes less than 2 seconds  Findings: No rash  Neurological:      General: No focal deficit present  Mental Status: She is alert and oriented to person, place, and time  Cranial Nerves: No cranial nerve deficit  Sensory: No sensory deficit  Motor: No weakness  Coordination: Coordination normal       Gait: Gait normal       Deep Tendon Reflexes: Reflexes normal    Psychiatric:         Mood and Affect: Mood is anxious           Behavior: Behavior normal          DEANDRE Green  05/25/22  12:50 PM

## 2022-05-25 NOTE — ASSESSMENT & PLAN NOTE
BP today 160/80 and on chart review has had other elevated readings, patient reports home readings often ~150/90 - would recommend starting low dose antihypertensive at this time; patient prefers to discuss further with her PCP so will defer to him  Advised patient to check BP twice daily and record for PCP review at next visit

## 2022-06-03 ENCOUNTER — CLINICAL SUPPORT (OUTPATIENT)
Dept: FAMILY MEDICINE CLINIC | Facility: CLINIC | Age: 57
End: 2022-06-03

## 2022-06-03 VITALS
OXYGEN SATURATION: 99 % | TEMPERATURE: 98.7 F | DIASTOLIC BLOOD PRESSURE: 80 MMHG | SYSTOLIC BLOOD PRESSURE: 138 MMHG | HEART RATE: 85 BPM

## 2022-06-03 DIAGNOSIS — Z01.30 BP CHECK: Primary | ICD-10-CM

## 2022-06-06 ENCOUNTER — TELEPHONE (OUTPATIENT)
Dept: RADIOLOGY | Facility: HOSPITAL | Age: 57
End: 2022-06-06

## 2022-06-06 ENCOUNTER — APPOINTMENT (EMERGENCY)
Dept: CT IMAGING | Facility: HOSPITAL | Age: 57
End: 2022-06-06
Payer: COMMERCIAL

## 2022-06-06 ENCOUNTER — APPOINTMENT (EMERGENCY)
Dept: RADIOLOGY | Facility: HOSPITAL | Age: 57
End: 2022-06-06
Payer: COMMERCIAL

## 2022-06-06 ENCOUNTER — HOSPITAL ENCOUNTER (EMERGENCY)
Facility: HOSPITAL | Age: 57
Discharge: HOME | End: 2022-06-06
Attending: EMERGENCY MEDICINE
Payer: COMMERCIAL

## 2022-06-06 VITALS
BODY MASS INDEX: 32.37 KG/M2 | OXYGEN SATURATION: 100 % | WEIGHT: 171.3 LBS | HEART RATE: 90 BPM | TEMPERATURE: 98.3 F | SYSTOLIC BLOOD PRESSURE: 175 MMHG | DIASTOLIC BLOOD PRESSURE: 83 MMHG | RESPIRATION RATE: 18 BRPM
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
## 2022-06-06 DIAGNOSIS — M54.2 NECK PAIN: ICD-10-CM

## 2022-06-06 DIAGNOSIS — R51.9 HEADACHE: ICD-10-CM

## 2022-06-06 DIAGNOSIS — M25.519 SHOULDER PAIN: ICD-10-CM

## 2022-06-06 PROCEDURE — 99284 EMERGENCY DEPT VISIT MOD MDM: CPT

## 2022-06-06 PROCEDURE — 70450 CT HEAD/BRAIN W/O DYE: CPT

## 2022-06-06 PROCEDURE — 73030 X-RAY EXAM OF SHOULDER: CPT

## 2022-06-06 PROCEDURE — 70486 CT MAXILLOFACIAL W/O DYE: CPT

## 2022-06-06 PROCEDURE — G1004 CDSM NDSC: HCPCS

## 2022-06-06 PROCEDURE — 72125 CT NECK SPINE W/O DYE: CPT

## 2022-06-06 PROCEDURE — 96372 THER/PROPH/DIAG INJ SC/IM: CPT

## 2022-06-06 RX ORDER — KETOROLAC TROMETHAMINE 30 MG/ML
15 INJECTION, SOLUTION INTRAMUSCULAR; INTRAVENOUS ONCE
Status: COMPLETED | OUTPATIENT
Start: 2022-06-06 | End: 2022-06-06

## 2022-06-06 RX ORDER — METHOCARBAMOL 500 MG/1
500 TABLET, FILM COATED ORAL 2 TIMES DAILY
Qty: 20 TABLET | Refills: 0 | Status: SHIPPED | OUTPATIENT
Start: 2022-06-06 | End: 2022-07-07

## 2022-06-06 RX ORDER — LIDOCAINE 50 MG/G
1 PATCH TOPICAL ONCE
Status: DISCONTINUED | OUTPATIENT
Start: 2022-06-06 | End: 2022-06-06 | Stop reason: HOSPADM

## 2022-06-06 RX ADMIN — LIDOCAINE 1 PATCH: 50 PATCH TOPICAL at 10:53

## 2022-06-06 RX ADMIN — KETOROLAC TROMETHAMINE 15 MG: 30 INJECTION, SOLUTION INTRAMUSCULAR; INTRAVENOUS at 10:57
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
## 2022-06-06 NOTE — TELEPHONE ENCOUNTER
Pt is in a c-collar and has a bra on & thick shirt which will cause an artifact  Please call x-ray when ct c-spine is cleared and pt can be changed into a gown

## 2022-06-06 NOTE — Clinical Note
Eliseo Walter accompanied [address] to the emergency department on 6/6/2022  Return date if applicable: [id number]        If you have any questions or concerns, please don't hesitate to call        Kush Quezada RN

## 2022-06-06 NOTE — Clinical Note
Marizaharish Taye was seen and treated in our emergency department on 6/6/2022  Diagnosis:     Edelmira Boland  may return to work on return date  She may return on this date: 06/08/2022         If you have any questions or concerns, please don't hesitate to call        Bing Miles PA-C    ______________________________           _______________          _______________  Hospital Representative                              Date                                Time

## 2022-06-06 NOTE — Clinical Note
David Shell was seen and treated in our emergency department on 6/6/2022  Diagnosis:     Quita Callahan  may return to work on return date  She may return on this date: 06/13/2022         If you have any questions or concerns, please don't hesitate to call        Jamil Bermudez PA-C    ______________________________           _______________          _______________  Hospital Representative                              Date                                Time

## 2022-06-06 NOTE — Clinical Note
Trina Dixon was seen and treated in our emergency department on 6/6/2022  Diagnosis:     Nancy Mosquera  may return to work on return date  She may return on this date: 06/13/2022         If you have any questions or concerns, please don't hesitate to call        Alphonso Olmedo PA-C    ______________________________           _______________          _______________  Hospital Representative                              Date                                Time

## 2022-06-08 ENCOUNTER — TELEPHONE (OUTPATIENT)
Dept: FAMILY MEDICINE CLINIC | Facility: CLINIC | Age: 57
End: 2022-06-08
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
## 2022-06-09 ENCOUNTER — OFFICE VISIT (OUTPATIENT)
Dept: FAMILY MEDICINE CLINIC | Facility: CLINIC | Age: 57
End: 2022-06-09

## 2022-06-09 VITALS
TEMPERATURE: 97.5 F | BODY MASS INDEX: 31.53 KG/M2 | HEIGHT: 61 IN | HEART RATE: 76 BPM | WEIGHT: 167 LBS | SYSTOLIC BLOOD PRESSURE: 128 MMHG | OXYGEN SATURATION: 99 % | DIASTOLIC BLOOD PRESSURE: 84 MMHG | RESPIRATION RATE: 16 BRPM

## 2022-06-09 DIAGNOSIS — M25.512 ACUTE PAIN OF LEFT SHOULDER: ICD-10-CM

## 2022-06-09 DIAGNOSIS — S16.1XXA STRAIN OF NECK MUSCLE, INITIAL ENCOUNTER: Primary | ICD-10-CM

## 2022-06-09 DIAGNOSIS — R51.9 LEFT-SIDED HEADACHE: ICD-10-CM

## 2022-06-09 DIAGNOSIS — S29.019A ACUTE THORACIC MYOFASCIAL STRAIN, INITIAL ENCOUNTER: ICD-10-CM

## 2022-06-09 PROCEDURE — 99213 OFFICE O/P EST LOW 20 MIN: CPT | Performed by: FAMILY MEDICINE

## 2022-06-09 RX ORDER — LIDOCAINE 50 MG/G
1 PATCH TOPICAL DAILY
Qty: 15 PATCH | Refills: 1 | Status: SHIPPED | OUTPATIENT
Start: 2022-06-09

## 2022-06-09 NOTE — LETTER
June 9, 2022     Patient: Andrea Huizar  YOB: 1965  Date of Visit: 6/9/2022      To Whom it May Concern:    Andrea Huizar is under my professional care  Sabino Guan was seen in my office on 6/9/2022  Please excuse from work  Sabino Guan may return to work on 6/20/2022  If you have any questions or concerns, please don't hesitate to call           Sincerely,          Terrence Shannon MD        CC: No Recipients
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
## 2022-06-16 ENCOUNTER — TELEPHONE (OUTPATIENT)
Dept: FAMILY MEDICINE CLINIC | Facility: CLINIC | Age: 57
End: 2022-06-16

## 2022-06-16 ENCOUNTER — EVALUATION (OUTPATIENT)
Dept: PHYSICAL THERAPY | Facility: CLINIC | Age: 57
End: 2022-06-16
Payer: COMMERCIAL

## 2022-06-16 DIAGNOSIS — S16.1XXA STRAIN OF NECK MUSCLE, INITIAL ENCOUNTER: ICD-10-CM

## 2022-06-16 DIAGNOSIS — S29.019A ACUTE THORACIC MYOFASCIAL STRAIN, INITIAL ENCOUNTER: ICD-10-CM

## 2022-06-16 PROCEDURE — 97112 NEUROMUSCULAR REEDUCATION: CPT | Performed by: PHYSICAL THERAPIST

## 2022-06-16 PROCEDURE — 97010 HOT OR COLD PACKS THERAPY: CPT | Performed by: PHYSICAL THERAPIST

## 2022-06-16 PROCEDURE — 97161 PT EVAL LOW COMPLEX 20 MIN: CPT | Performed by: PHYSICAL THERAPIST

## 2022-06-16 PROCEDURE — 97014 ELECTRIC STIMULATION THERAPY: CPT | Performed by: PHYSICAL THERAPIST

## 2022-06-16 NOTE — LETTER
June 16, 2022     Patient: Susan Pappas [place]  YOB: 1965  Date of Visit: 6/16/2022      To Whom it May Concern:    Kiara Mark is under my professional care  Please excuse from work from 6/20/2022 until re-evaluated by myself on 6/23/2022  Her return to work status to work to be determined on 6/23/2022  If you have any questions or concerns, please don't hesitate to call           Sincerely,          Rosanne Bhat MD        CC: No Recipients
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
## 2022-06-16 NOTE — TELEPHONE ENCOUNTER
Patient came in the office requesting if provider can extend her letter for work, she is supposed to return on 06/20/2022 she states she is in bad pain, she has a f/u appt on 06/23/20222 she would like  The letter for work until she sees provider on 06/23/2022    Please advise

## 2022-06-20 ENCOUNTER — OFFICE VISIT (OUTPATIENT)
Dept: PHYSICAL THERAPY | Facility: CLINIC | Age: 57
End: 2022-06-20
Payer: COMMERCIAL

## 2022-06-20 DIAGNOSIS — S29.019A ACUTE THORACIC MYOFASCIAL STRAIN, INITIAL ENCOUNTER: ICD-10-CM

## 2022-06-20 DIAGNOSIS — S16.1XXA STRAIN OF NECK MUSCLE, INITIAL ENCOUNTER: Primary | ICD-10-CM

## 2022-06-20 PROCEDURE — 97110 THERAPEUTIC EXERCISES: CPT

## 2022-06-20 PROCEDURE — 97140 MANUAL THERAPY 1/> REGIONS: CPT
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
## 2022-06-23 ENCOUNTER — OFFICE VISIT (OUTPATIENT)
Dept: FAMILY MEDICINE CLINIC | Facility: CLINIC | Age: 57
End: 2022-06-23

## 2022-06-23 VITALS
SYSTOLIC BLOOD PRESSURE: 138 MMHG | OXYGEN SATURATION: 98 % | DIASTOLIC BLOOD PRESSURE: 90 MMHG | WEIGHT: 164.8 LBS | TEMPERATURE: 97.1 F | HEIGHT: 61 IN | BODY MASS INDEX: 31.11 KG/M2 | HEART RATE: 82 BPM | RESPIRATION RATE: 16 BRPM

## 2022-06-23 DIAGNOSIS — R51.9 LEFT-SIDED HEADACHE: Primary | ICD-10-CM

## 2022-06-23 DIAGNOSIS — S16.1XXA STRAIN OF NECK MUSCLE, INITIAL ENCOUNTER: ICD-10-CM

## 2022-06-23 DIAGNOSIS — S29.019A ACUTE THORACIC MYOFASCIAL STRAIN, INITIAL ENCOUNTER: ICD-10-CM

## 2022-06-23 DIAGNOSIS — M25.512 ACUTE PAIN OF LEFT SHOULDER: ICD-10-CM

## 2022-06-23 PROCEDURE — 99213 OFFICE O/P EST LOW 20 MIN: CPT | Performed by: FAMILY MEDICINE

## 2022-06-23 PROCEDURE — 3008F BODY MASS INDEX DOCD: CPT | Performed by: NURSE PRACTITIONER
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
## 2022-06-23 NOTE — LETTER
June 23, 2022     Patient: Aline Mancini [facility]  YOB: 1965  Date of Visit: 6/23/2022      To Whom it May Concern:    Shala Smith is under my professional care  Glen Aguiar was seen in my office on 6/23/2022  Glen Orn may return to work on 6/29/2022  If you have any questions or concerns, please don't hesitate to call           Sincerely,          Rocío Jo MD        CC: No Recipients

## 2022-06-24 ENCOUNTER — OFFICE VISIT (OUTPATIENT)
Dept: PHYSICAL THERAPY | Facility: CLINIC | Age: 57
End: 2022-06-24
Payer: COMMERCIAL

## 2022-06-24 DIAGNOSIS — S16.1XXA STRAIN OF NECK MUSCLE, INITIAL ENCOUNTER: Primary | ICD-10-CM

## 2022-06-24 DIAGNOSIS — S29.019A ACUTE THORACIC MYOFASCIAL STRAIN, INITIAL ENCOUNTER: ICD-10-CM

## 2022-06-24 PROCEDURE — 97112 NEUROMUSCULAR REEDUCATION: CPT | Performed by: PHYSICAL THERAPIST

## 2022-06-24 PROCEDURE — 97110 THERAPEUTIC EXERCISES: CPT | Performed by: PHYSICAL THERAPIST

## 2022-06-24 PROCEDURE — 97140 MANUAL THERAPY 1/> REGIONS: CPT | Performed by: PHYSICAL THERAPIST
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
## 2022-06-28 ENCOUNTER — TELEPHONE (OUTPATIENT)
Dept: FAMILY MEDICINE CLINIC | Facility: CLINIC | Age: 57
End: 2022-06-28

## 2022-06-28 ENCOUNTER — OFFICE VISIT (OUTPATIENT)
Dept: PHYSICAL THERAPY | Facility: CLINIC | Age: 57
End: 2022-06-28
Payer: COMMERCIAL

## 2022-06-28 DIAGNOSIS — S29.019A ACUTE THORACIC MYOFASCIAL STRAIN, INITIAL ENCOUNTER: ICD-10-CM

## 2022-06-28 DIAGNOSIS — S16.1XXA STRAIN OF NECK MUSCLE, INITIAL ENCOUNTER: Primary | ICD-10-CM

## 2022-06-28 PROCEDURE — 97112 NEUROMUSCULAR REEDUCATION: CPT

## 2022-06-28 PROCEDURE — 97140 MANUAL THERAPY 1/> REGIONS: CPT

## 2022-06-28 PROCEDURE — 97110 THERAPEUTIC EXERCISES: CPT
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
## 2022-06-28 NOTE — TELEPHONE ENCOUNTER
[facility] insurance emperatriz [id number] form received on 6/28/22  to be completed by PCP  Copy made and placed in PCP folder  Forms to be delivered to PCP mailbox at assigned time

## 2022-07-01 ENCOUNTER — OFFICE VISIT (OUTPATIENT)
Dept: PHYSICAL THERAPY | Facility: CLINIC | Age: 57
End: 2022-07-01
Payer: COMMERCIAL

## 2022-07-01 DIAGNOSIS — S29.019A ACUTE THORACIC MYOFASCIAL STRAIN, INITIAL ENCOUNTER: ICD-10-CM

## 2022-07-01 DIAGNOSIS — S16.1XXA STRAIN OF NECK MUSCLE, INITIAL ENCOUNTER: Primary | ICD-10-CM

## 2022-07-01 PROCEDURE — 97140 MANUAL THERAPY 1/> REGIONS: CPT | Performed by: PHYSICAL THERAPIST

## 2022-07-01 PROCEDURE — 97110 THERAPEUTIC EXERCISES: CPT | Performed by: PHYSICAL THERAPIST

## 2022-07-01 PROCEDURE — 97112 NEUROMUSCULAR REEDUCATION: CPT | Performed by: PHYSICAL THERAPIST
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
## 2022-07-05 ENCOUNTER — APPOINTMENT (OUTPATIENT)
Dept: PHYSICAL THERAPY | Facility: CLINIC | Age: 57
End: 2022-07-05
Payer: COMMERCIAL

## 2022-07-07 ENCOUNTER — ANNUAL EXAM (OUTPATIENT)
Dept: OBGYN CLINIC | Facility: CLINIC | Age: 57
End: 2022-07-07

## 2022-07-07 VITALS
HEART RATE: 64 BPM | BODY MASS INDEX: 29.52 KG/M2 | DIASTOLIC BLOOD PRESSURE: 80 MMHG | SYSTOLIC BLOOD PRESSURE: 144 MMHG | WEIGHT: 166.6 LBS | HEIGHT: 63 IN

## 2022-07-07 DIAGNOSIS — Z12.31 ENCOUNTER FOR SCREENING MAMMOGRAM FOR MALIGNANT NEOPLASM OF BREAST: ICD-10-CM

## 2022-07-07 DIAGNOSIS — Z11.3 SCREEN FOR STD (SEXUALLY TRANSMITTED DISEASE): ICD-10-CM

## 2022-07-07 DIAGNOSIS — Z12.4 SCREENING FOR CERVICAL CANCER: ICD-10-CM

## 2022-07-07 DIAGNOSIS — Z01.419 ENCOUNTER FOR GYNECOLOGICAL EXAMINATION WITHOUT ABNORMAL FINDING: Primary | ICD-10-CM

## 2022-07-07 DIAGNOSIS — Z12.39 ENCOUNTER FOR BREAST CANCER SCREENING USING NON-MAMMOGRAM MODALITY: ICD-10-CM

## 2022-07-07 PROBLEM — S29.019A ACUTE THORACIC MYOFASCIAL STRAIN: Status: RESOLVED | Noted: 2022-06-09 | Resolved: 2022-07-07

## 2022-07-07 PROBLEM — L20.82 FLEXURAL ECZEMA: Status: RESOLVED | Noted: 2017-08-01 | Resolved: 2022-07-07

## 2022-07-07 PROBLEM — K21.9 GERD (GASTROESOPHAGEAL REFLUX DISEASE): Status: RESOLVED | Noted: 2020-02-13 | Resolved: 2022-07-07

## 2022-07-07 PROBLEM — R51.9 LEFT-SIDED HEADACHE: Status: RESOLVED | Noted: 2022-06-09 | Resolved: 2022-07-07

## 2022-07-07 PROBLEM — S16.1XXA NECK STRAIN: Status: RESOLVED | Noted: 2022-06-09 | Resolved: 2022-07-07

## 2022-07-07 PROBLEM — R03.0 ELEVATED BLOOD PRESSURE READING: Status: RESOLVED | Noted: 2022-03-18 | Resolved: 2022-07-07

## 2022-07-07 PROBLEM — U07.1 PNEUMONIA DUE TO COVID-19 VIRUS: Status: RESOLVED | Noted: 2021-12-20 | Resolved: 2022-07-07

## 2022-07-07 PROBLEM — J12.82 PNEUMONIA DUE TO COVID-19 VIRUS: Status: RESOLVED | Noted: 2021-12-20 | Resolved: 2022-07-07

## 2022-07-07 PROBLEM — M25.512 ACUTE PAIN OF LEFT SHOULDER: Status: RESOLVED | Noted: 2022-06-09 | Resolved: 2022-07-07

## 2022-07-07 PROBLEM — G56.01 CARPAL TUNNEL SYNDROME OF RIGHT WRIST: Status: RESOLVED | Noted: 2017-09-07 | Resolved: 2022-07-07

## 2022-07-07 PROBLEM — Z00.00 HEALTHCARE MAINTENANCE: Status: RESOLVED | Noted: 2022-04-13 | Resolved: 2022-07-07

## 2022-07-07 PROCEDURE — 0503F POSTPARTUM CARE VISIT: CPT | Performed by: NURSE PRACTITIONER

## 2022-07-07 PROCEDURE — G0145 SCR C/V CYTO,THINLAYER,RESCR: HCPCS | Performed by: NURSE PRACTITIONER

## 2022-07-07 PROCEDURE — 87491 CHLMYD TRACH DNA AMP PROBE: CPT | Performed by: NURSE PRACTITIONER

## 2022-07-07 PROCEDURE — 87591 N.GONORRHOEAE DNA AMP PROB: CPT | Performed by: NURSE PRACTITIONER

## 2022-07-07 PROCEDURE — G0476 HPV COMBO ASSAY CA SCREEN: HCPCS | Performed by: NURSE PRACTITIONER

## 2022-07-07 PROCEDURE — 99396 PREV VISIT EST AGE 40-64: CPT | Performed by: NURSE PRACTITIONER

## 2022-07-07 NOTE — PROGRESS NOTES
Rolf Mcclain is a 65 y o  female who presents today for annual GYN exam   Her last pap smear was performed 2019 and result was NILM with negative HPV  She reports no history of abnormal pap smears in her past  Her last mammogram was performed 9/15/2021 and result was WNL  She had colon cancer screening performed 2020  She reports menses as absent since   Her general medical history has been reviewed and she reports it as follows:    Past Medical History:   Diagnosis Date    Asthma     GERD (gastroesophageal reflux disease)     Prediabetes     Vitamin D deficiency      Past Surgical History:   Procedure Laterality Date    NO PAST SURGERIES       OB History        3    Para   1    Term   1       0    AB   2    Living   1       SAB   0    IAB   2    Ectopic   0    Multiple   0    Live Births   1               Social History     Tobacco Use    Smoking status: Never Smoker    Smokeless tobacco: Never Used   Vaping Use    Vaping Use: Never used   Substance Use Topics    Alcohol use: Yes    Drug use: Never     Social History     Substance and Sexual Activity   Sexual Activity Yes    Partners: Male    Birth control/protection: Post-menopausal     Cancer-related family history includes Breast cancer in her maternal aunt and maternal aunt  There is no history of Colon cancer or Ovarian cancer  Current Outpatient Medications   Medication Instructions    albuterol (Ventolin HFA) 90 mcg/act inhaler 2 puffs, Inhalation, Every 6 hours PRN    famotidine (PEPCID) 20 mg, Oral, 2 times daily    lidocaine (Lidoderm) 5 % 1 patch, Topical, Daily, Remove & Discard patch within 12 hours or as directed by MD       Review of Systems:  Review of Systems   Constitutional: Negative  Gastrointestinal: Negative  Genitourinary: Negative for difficulty urinating, pelvic pain, vaginal bleeding and vaginal discharge  Skin: Negative  Physical Exam:  /80   Pulse 64   Ht 5' 3" (1 6 m)   Wt 75 6 kg (166 lb 9 6 oz)   BMI 29 51 kg/m²   Physical Exam  Constitutional:       General: She is not in acute distress  Appearance: She is well-developed  Genitourinary:      Vulva normal       No lesions in the vagina  Right Adnexa: not tender and no mass present  Left Adnexa: not tender and no mass present  No cervical motion tenderness or lesion  Uterus is not tender  Breasts:      Right: No mass, nipple discharge, skin change or tenderness  Left: No mass, nipple discharge, skin change or tenderness  Neck:      Thyroid: No thyromegaly  Cardiovascular:      Rate and Rhythm: Normal rate and regular rhythm  Pulmonary:      Effort: Pulmonary effort is normal    Abdominal:      Palpations: Abdomen is soft  Tenderness: There is no abdominal tenderness  Musculoskeletal:      Cervical back: Neck supple  Neurological:      Mental Status: She is alert and oriented to person, place, and time  Skin:     General: Skin is warm and dry  Vitals reviewed  Assessment/Plan:   1  Normal well-woman GYN exam   2  Cervical cancer screening:  Normal cervical exam   Pap smear done with HPV co-testing  3  STD screening:  Orders placed for vaginal GC/CT cultures  Orders placed for serum anti-HIV, anti-HCV, HbsAg, RPR    4  Breast cancer screening:  Normal breast exam   Order placed for bilateral screening mammogram   Reviewed breast self-awareness  5  Colon cancer screening:  Up to date  6  Depression Screening: Patient's depression screening was assessed with a PHQ-2 score of 0  Their PHQ-9 score was 0  Clinically patient does not have depression  No treatment is required  7  BMI Counseling: Body mass index is 29 51 kg/m²  No intervention indicated     8  Return to office in 1 year for annual GYN exam     Reviewed with patient that test results are available in [facility] immediately, but that they will not necessarily be reviewed by me immediately  Explained that I will review results at my earliest opportunity and contact patient appropriately

## 2022-07-07 NOTE — LETTER
2022    To [facility]  : 1965      This letter is to advise you that your recent PAP SMEAR results were reviewed by me and are NORMAL    We will see you in 1 year for your annual exam     DEANDRE Dang
2022    To [facility]  : 1965      This letter is to advise you that your recent BLOODWORK for Sexually-Transmitted Diseases (HIV, hepatitis B, hepatitis C, and syphilis) results were reviewed by me and are NORMAL  Please contact our office for an appointment if you have any additional concerns      DEANDRE Dang
2022    To [facility]  : 1965      This letter is to advise you that your recent CULTURE for gonorrhea and chlamydia results were reviewed by me and are NORMAL  Please contact the office for an appointment if you have any additional concerns      DEANDRE Bonilla
Yes

## 2022-07-07 NOTE — PATIENT INSTRUCTIONS
Thank you for your confidence in our team    We appreciate you and welcome your feedback  If you receive a survey from us, please take a few moments to let us know how we are doing     Sincerely,  DEANDRE Doss

## 2022-07-07 NOTE — TELEPHONE ENCOUNTER
patient   returning your call  she said to please leave a detail message on her voice mail, she would like to know if it's regarding her nation wide insurance  she's at work right now

## 2022-07-08 ENCOUNTER — OFFICE VISIT (OUTPATIENT)
Dept: PHYSICAL THERAPY | Facility: CLINIC | Age: 57
End: 2022-07-08
Payer: COMMERCIAL

## 2022-07-08 DIAGNOSIS — S29.019A ACUTE THORACIC MYOFASCIAL STRAIN, INITIAL ENCOUNTER: ICD-10-CM

## 2022-07-08 DIAGNOSIS — S16.1XXA STRAIN OF NECK MUSCLE, INITIAL ENCOUNTER: Primary | ICD-10-CM

## 2022-07-08 PROCEDURE — 97112 NEUROMUSCULAR REEDUCATION: CPT

## 2022-07-08 PROCEDURE — 97110 THERAPEUTIC EXERCISES: CPT

## 2022-07-08 PROCEDURE — 97530 THERAPEUTIC ACTIVITIES: CPT
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
## 2022-07-12 ENCOUNTER — APPOINTMENT (OUTPATIENT)
Dept: LAB | Facility: HOSPITAL | Age: 57
End: 2022-07-12
Attending: NURSE PRACTITIONER
Payer: COMMERCIAL

## 2022-07-12 ENCOUNTER — OFFICE VISIT (OUTPATIENT)
Dept: PHYSICAL THERAPY | Facility: CLINIC | Age: 57
End: 2022-07-12
Payer: COMMERCIAL

## 2022-07-12 DIAGNOSIS — Z11.3 SCREEN FOR STD (SEXUALLY TRANSMITTED DISEASE): ICD-10-CM

## 2022-07-12 DIAGNOSIS — S16.1XXA STRAIN OF NECK MUSCLE, INITIAL ENCOUNTER: Primary | ICD-10-CM

## 2022-07-12 DIAGNOSIS — S29.019A ACUTE THORACIC MYOFASCIAL STRAIN, INITIAL ENCOUNTER: ICD-10-CM

## 2022-07-12 LAB
C TRACH DNA SPEC QL NAA+PROBE: NEGATIVE
HPV HR 12 DNA CVX QL NAA+PROBE: NEGATIVE
HPV16 DNA CVX QL NAA+PROBE: NEGATIVE
HPV18 DNA CVX QL NAA+PROBE: NEGATIVE
N GONORRHOEA DNA SPEC QL NAA+PROBE: NEGATIVE

## 2022-07-12 PROCEDURE — 97112 NEUROMUSCULAR REEDUCATION: CPT

## 2022-07-12 PROCEDURE — 86803 HEPATITIS C AB TEST: CPT

## 2022-07-12 PROCEDURE — 87389 HIV-1 AG W/HIV-1&-2 AB AG IA: CPT

## 2022-07-12 PROCEDURE — 87340 HEPATITIS B SURFACE AG IA: CPT

## 2022-07-12 PROCEDURE — 86592 SYPHILIS TEST NON-TREP QUAL: CPT

## 2022-07-12 PROCEDURE — 97530 THERAPEUTIC ACTIVITIES: CPT

## 2022-07-12 PROCEDURE — 97110 THERAPEUTIC EXERCISES: CPT

## 2022-07-12 PROCEDURE — 36415 COLL VENOUS BLD VENIPUNCTURE: CPT

## 2022-07-12 NOTE — LETTER
July 12, 2022     Patient: Diamond Best [place]  YOB: 1965  Date of Visit: 7/12/2022      To Whom it May Concern:    Zuleima Yne is under my professional care  [facility] was seen in my office on 7/12/2022  If you have any questions or concerns, please don't hesitate to call            Sincerely,          Viviana Camacho, PTA        CC: No Recipients
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
## 2022-07-13 LAB
HBV SURFACE AG SER QL: NORMAL
HCV AB SER QL: NORMAL
HIV 1+2 AB+HIV1 P24 AG SERPL QL IA: NORMAL
LAB AP GYN PRIMARY INTERPRETATION: NORMAL
Lab: NORMAL
RPR SER QL: NORMAL

## 2022-07-15 ENCOUNTER — EVALUATION (OUTPATIENT)
Dept: PHYSICAL THERAPY | Facility: CLINIC | Age: 57
End: 2022-07-15
Payer: COMMERCIAL

## 2022-07-15 DIAGNOSIS — S16.1XXA STRAIN OF NECK MUSCLE, INITIAL ENCOUNTER: Primary | ICD-10-CM

## 2022-07-15 DIAGNOSIS — S29.019A ACUTE THORACIC MYOFASCIAL STRAIN, INITIAL ENCOUNTER: ICD-10-CM

## 2022-07-15 PROCEDURE — 97110 THERAPEUTIC EXERCISES: CPT | Performed by: PHYSICAL THERAPIST

## 2022-07-15 PROCEDURE — 97164 PT RE-EVAL EST PLAN CARE: CPT | Performed by: PHYSICAL THERAPIST

## 2022-07-15 PROCEDURE — 97140 MANUAL THERAPY 1/> REGIONS: CPT | Performed by: PHYSICAL THERAPIST
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
## 2022-07-15 NOTE — LETTER
July 15, 2022     Patient: Suzy Trejo [place]  YOB: 1965  Date of Visit: 7/15/2022      To Whom it May Concern:    Jose Cabrera is under my professional care  Namita Ling was seen in my office on 7/15/2022  If you have any questions or concerns, please don't hesitate to call  Sincerely,      Tracey Gleason PT, DPT  Board Certified Clinical Specialist in [facility] Director   Physical Therapy at [address] [place] [address] Leno Randhawa   [address]: [phone]  F: [phone]  [email or web address]      CC: No Recipients

## 2022-07-18 ENCOUNTER — OFFICE VISIT (OUTPATIENT)
Dept: FAMILY MEDICINE CLINIC | Facility: CLINIC | Age: 57
End: 2022-07-18

## 2022-07-18 ENCOUNTER — TELEPHONE (OUTPATIENT)
Dept: FAMILY MEDICINE CLINIC | Facility: CLINIC | Age: 57
End: 2022-07-18

## 2022-07-18 VITALS
BODY MASS INDEX: 30.58 KG/M2 | HEIGHT: 61 IN | HEART RATE: 88 BPM | RESPIRATION RATE: 18 BRPM | DIASTOLIC BLOOD PRESSURE: 80 MMHG | OXYGEN SATURATION: 97 % | TEMPERATURE: 98.1 F | SYSTOLIC BLOOD PRESSURE: 124 MMHG | WEIGHT: 162 LBS

## 2022-07-18 DIAGNOSIS — K52.9 GASTROENTERITIS: Primary | ICD-10-CM

## 2022-07-18 DIAGNOSIS — R11.2 NAUSEA AND VOMITING, UNSPECIFIED VOMITING TYPE: ICD-10-CM

## 2022-07-18 LAB
SARS-COV-2 AG UPPER RESP QL IA: NEGATIVE
VALID CONTROL: NORMAL

## 2022-07-18 PROCEDURE — 99213 OFFICE O/P EST LOW 20 MIN: CPT | Performed by: FAMILY MEDICINE

## 2022-07-18 PROCEDURE — 87811 SARS-COV-2 COVID19 W/OPTIC: CPT | Performed by: FAMILY MEDICINE

## 2022-07-18 NOTE — ASSESSMENT & PLAN NOTE
Possibly viral gastroenteritis  [place] diet, increase hydration  Monitor symptoms and mange conservatively for now  Covid inoffice test negative

## 2022-07-18 NOTE — PROGRESS NOTES
Assessment/Plan:    Gastroenteritis  Possibly viral gastroenteritis  [place] diet, increase hydration  Monitor symptoms and mange conservatively for now  Covid inoffice test negative      Return if symptoms worsen or fail to improve  Diagnoses and all orders for this visit:    Gastroenteritis    Nausea and vomiting, unspecified vomiting type  -     POCT Rapid Covid Ag        Subjective:     HPI  Peggy Linares is a 65 y o  female  who presented to the office today for a SAME DAY VISIT with the following symptoms which started late Saturday evening  Pt has diarrhea, nausea, has vomited, and feeling generally unwell  She visited a [employer] show 3 days ago with greater than 100 attendees and is concerned she may have Covid  Pt's daughter also developing similar sx  Pt works in a Dental Office at the   The following portions of the patient's history were reviewed and updated as appropriate: allergies, current medications, past family history, past medical history, past social history, past surgical history and problem list     Patient Active Problem List   Diagnosis    Asthma    Prediabetes    Gastroenteritis       Current Outpatient Medications:     albuterol (Ventolin HFA) 90 mcg/act inhaler, Inhale 2 puffs every 6 (six) hours as needed for wheezing, Disp: 18 g, Rfl: 5    famotidine (PEPCID) 20 mg tablet, Take 1 tablet (20 mg total) by mouth 2 (two) times a day, Disp: 90 tablet, Rfl: 1    lidocaine (Lidoderm) 5 %, Apply 1 patch topically daily Remove & Discard patch within 12 hours or as directed by MD, Disp: 15 patch, Rfl: 1    Review of Systems   Constitutional: Negative for chills and fever  HENT: Negative for congestion, rhinorrhea and sore throat  Gastrointestinal: Positive for abdominal pain, diarrhea, nausea and vomiting  Skin: Negative for rash  Neurological: Positive for dizziness  Negative for headaches           Objective:    /80 (BP Location: Left arm, Patient Position: Sitting, Cuff Size: Standard)   Pulse 88   Temp 98 1 °F (36 7 °C) (Temporal)   Resp 18   Ht 5' 1" (1 549 m)   Wt 73 5 kg (162 lb)   SpO2 97%   BMI 30 61 kg/m²     Physical Exam  Constitutional:       Appearance: She is obese  HENT:      Head: Normocephalic and atraumatic  Mouth/Throat:      Mouth: Mucous membranes are moist    Eyes:      Extraocular Movements: Extraocular movements intact  Conjunctiva/sclera: Conjunctivae normal    Cardiovascular:      Rate and Rhythm: Normal rate and regular rhythm  Heart sounds: Normal heart sounds  Pulmonary:      Effort: Pulmonary effort is normal  No respiratory distress  Breath sounds: Normal breath sounds  Abdominal:      General: Bowel sounds are normal  There is no distension  Palpations: Abdomen is soft  Tenderness: There is no abdominal tenderness  Musculoskeletal:      Right lower leg: No edema  Left lower leg: No edema  Neurological:      Mental Status: She is alert and oriented to person, place, and time  Psychiatric:         Mood and Affect: Mood normal          Thought Content:  Thought content normal          Sakina Mcelroy MD  07/18/22  5:51 PM

## 2022-07-18 NOTE — PATIENT INSTRUCTIONS
Gastroenteritis   AMBULATORY CARE:   Gastroenteritis , or stomach flu, is an infection of the stomach and intestines  It is caused by bacteria, parasites, or viruses  Common symptoms include the following:   Diarrhea or gas    Nausea, vomiting, or poor appetite    Abdominal cramps, pain, or gurgling    Fever    Tiredness or weakness    Headaches or muscle aches with any of the above symptoms    Call 911 for any of the following: You have trouble breathing or a very fast pulse  Seek care immediately if:   You see blood in your diarrhea  You cannot stop vomiting  You have not urinated for 12 hours  You feel like you are going to faint  Contact your healthcare provider if:   You have a fever  You continue to vomit or have diarrhea, even after treatment  You see worms in your diarrhea  Your mouth or eyes are dry  You are not urinating as much or as often  You have questions or concerns about your condition or care  Treatment for gastroenteritis  may include medicines to slow or stop your diarrhea or vomiting  You may also need medicines to treat an infection caused by bacteria or a parasite  Manage your symptoms:   Drink liquids as directed  Ask your healthcare provider how much liquid to drink each day, and which liquids are best for you  You may also need to drink an oral rehydration solution (ORS)  An ORS has the right amounts of sugar, salt, and minerals in water to replace body fluids  Eat bland foods  When you feel hungry, begin eating soft, bland foods  Examples are bananas, clear soup, potatoes, and applesauce  Do not have dairy products, alcohol, sugary drinks, or drinks with caffeine until you feel better  Rest as much as possible  Slowly start to do more each day when you begin to feel better  Prevent the spread of germs:  Gastroenteritis can spread easily   Keep yourself, your family, and your surroundings clean to help prevent the spread of gastroenteritis:  Wash your hands often  Use soap and water  Wash your hands after you use the bathroom, change a child's diapers, or sneeze  Wash your hands before you prepare or eat food  Clean surfaces and do laundry often  Wash your clothes and towels separately from the rest of the laundry  Clean surfaces in your home with antibacterial  or bleach  Clean food thoroughly and cook safely  Wash raw vegetables before you cook  Cook meat, fish, and eggs fully  Do not use the same dishes for raw meat as you do for other foods  Refrigerate any leftover food immediately  Be aware when you camp or travel  Drink only clean water  Do not drink from rivers or lakes unless you purify or boil the water first  When you travel, drink bottled water and do not add ice  Do not eat fruit that has not been peeled  Do not eat raw fish or meat that is not fully cooked  Follow up with your doctor as directed:  Write down your questions so you remember to ask them during your visits  © Copyright [employer] 2022 Information is for End User's use only and may not be sold, redistributed or otherwise used for commercial purposes  All illustrations and images included in CareNotes® are the copyrighted property of A D A M [employer]  or Srinivas Juárez   The above information is an  only  It is not intended as medical advice for individual conditions or treatments  Talk to your doctor, nurse or pharmacist before following any medical regimen to see if it is safe and effective for you

## 2022-07-18 NOTE — LETTER
July 18, 2022     Patient: Len Hernandez [place]  YOB: 1965  Date of Visit: 7/18/2022      To Whom it May Concern:    Peggy Linares is under my professional care  Debby Hernandez was seen in my office on 7/18/2022  Debby Hernandez may return to work on 7/19/2022 if feeling better, otherwise she may return to to work on 7/20/2022  If you have any questions or concerns, please don't hesitate to call           Sincerely,          Ericka Treviño MD        CC: No Recipients

## 2022-07-19 ENCOUNTER — OFFICE VISIT (OUTPATIENT)
Dept: FAMILY MEDICINE CLINIC | Facility: CLINIC | Age: 57
End: 2022-07-19

## 2022-07-19 VITALS
RESPIRATION RATE: 18 BRPM | HEIGHT: 61 IN | OXYGEN SATURATION: 98 % | SYSTOLIC BLOOD PRESSURE: 116 MMHG | HEART RATE: 88 BPM | WEIGHT: 161.7 LBS | TEMPERATURE: 97.7 F | BODY MASS INDEX: 30.53 KG/M2 | DIASTOLIC BLOOD PRESSURE: 80 MMHG

## 2022-07-19 DIAGNOSIS — Z00.00 ANNUAL PHYSICAL EXAM: Primary | ICD-10-CM

## 2022-07-19 PROCEDURE — 99396 PREV VISIT EST AGE 40-64: CPT | Performed by: FAMILY MEDICINE

## 2022-07-19 PROCEDURE — 1036F TOBACCO NON-USER: CPT | Performed by: FAMILY MEDICINE

## 2022-07-19 PROCEDURE — 3725F SCREEN DEPRESSION PERFORMED: CPT | Performed by: FAMILY MEDICINE

## 2022-07-19 NOTE — PATIENT INSTRUCTIONS

## 2022-07-20 ENCOUNTER — APPOINTMENT (OUTPATIENT)
Dept: PHYSICAL THERAPY | Facility: CLINIC | Age: 57
End: 2022-07-20
Payer: COMMERCIAL

## 2022-07-20 NOTE — ASSESSMENT & PLAN NOTE
· A1C-5 7  · Continue to monitor  · Dietary habits and exercise discussed  · Will check A1C at next visit for monitoring

## 2022-07-20 NOTE — ASSESSMENT & PLAN NOTE
· Discussed risks/benefits COVID vaccine; still contemplating at this time  · Cervical Cancer screening UTD-negative PAP  · Colonoscopy UTD  · Mammogram scheduled for September  · Discussed Pneumococcal Vaccination in the setting of Asthma-will consider

## 2022-07-27 ENCOUNTER — APPOINTMENT (OUTPATIENT)
Dept: PHYSICAL THERAPY | Facility: CLINIC | Age: 57
End: 2022-07-27
Payer: COMMERCIAL

## 2022-07-28 ENCOUNTER — OFFICE VISIT (OUTPATIENT)
Dept: FAMILY MEDICINE CLINIC | Facility: CLINIC | Age: 57
End: 2022-07-28

## 2022-07-28 VITALS
SYSTOLIC BLOOD PRESSURE: 118 MMHG | OXYGEN SATURATION: 98 % | TEMPERATURE: 98.7 F | HEIGHT: 61 IN | WEIGHT: 162 LBS | HEART RATE: 78 BPM | RESPIRATION RATE: 16 BRPM | DIASTOLIC BLOOD PRESSURE: 70 MMHG | BODY MASS INDEX: 30.58 KG/M2

## 2022-07-28 DIAGNOSIS — S16.1XXD STRAIN OF NECK MUSCLE, SUBSEQUENT ENCOUNTER: ICD-10-CM
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
## 2022-07-28 DIAGNOSIS — K21.9 GASTROESOPHAGEAL REFLUX DISEASE WITHOUT ESOPHAGITIS: ICD-10-CM

## 2022-07-28 PROCEDURE — 99214 OFFICE O/P EST MOD 30 MIN: CPT | Performed by: NURSE PRACTITIONER

## 2022-07-28 PROCEDURE — 3008F BODY MASS INDEX DOCD: CPT | Performed by: FAMILY MEDICINE

## 2022-07-28 RX ORDER — OMEPRAZOLE 20 MG/1
CAPSULE, DELAYED RELEASE ORAL
Qty: 24 CAPSULE | Refills: 0 | Status: SHIPPED | OUTPATIENT
Start: 2022-07-28 | End: 2022-09-22 | Stop reason: SDUPTHER

## 2022-07-28 RX ORDER — OMEPRAZOLE 20 MG/1
20 CAPSULE, DELAYED RELEASE ORAL DAILY
Status: CANCELLED | OUTPATIENT
Start: 2022-07-28
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
## 2022-07-29 ENCOUNTER — APPOINTMENT (OUTPATIENT)
Dept: LAB | Facility: CLINIC | Age: 57
End: 2022-07-29
Payer: COMMERCIAL

## 2022-07-29 ENCOUNTER — OFFICE VISIT (OUTPATIENT)
Dept: PHYSICAL THERAPY | Facility: CLINIC | Age: 57
End: 2022-07-29
Payer: COMMERCIAL

## 2022-07-29 DIAGNOSIS — S16.1XXA STRAIN OF NECK MUSCLE, INITIAL ENCOUNTER: Primary | ICD-10-CM

## 2022-07-29 DIAGNOSIS — S29.019A ACUTE THORACIC MYOFASCIAL STRAIN, INITIAL ENCOUNTER: ICD-10-CM

## 2022-07-29 DIAGNOSIS — K21.9 GASTROESOPHAGEAL REFLUX DISEASE WITHOUT ESOPHAGITIS: ICD-10-CM
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
## 2022-07-29 PROCEDURE — 97112 NEUROMUSCULAR REEDUCATION: CPT

## 2022-07-29 PROCEDURE — 97110 THERAPEUTIC EXERCISES: CPT

## 2022-07-29 PROCEDURE — 87338 HPYLORI STOOL AG IA: CPT

## 2022-07-29 NOTE — ASSESSMENT & PLAN NOTE
Omeprazole was previously d/c'd due to possible side effects of long term use and she was started on Pepcid but now has intractable rebound sx   Will check for H pylori and do PPI taper  In depth discussion regarding diet and lifestyle modifications
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
## 2022-07-30 LAB — H PYLORI AG STL QL IA: NEGATIVE

## 2022-08-08 ENCOUNTER — OFFICE VISIT (OUTPATIENT)
Dept: FAMILY MEDICINE CLINIC | Facility: CLINIC | Age: 57
End: 2022-08-08

## 2022-08-08 ENCOUNTER — TELEPHONE (OUTPATIENT)
Dept: FAMILY MEDICINE CLINIC | Facility: CLINIC | Age: 57
End: 2022-08-08

## 2022-08-08 VITALS
WEIGHT: 163 LBS | TEMPERATURE: 98.1 F | DIASTOLIC BLOOD PRESSURE: 86 MMHG | BODY MASS INDEX: 30.78 KG/M2 | HEIGHT: 61 IN | RESPIRATION RATE: 18 BRPM | OXYGEN SATURATION: 98 % | HEART RATE: 82 BPM | SYSTOLIC BLOOD PRESSURE: 138 MMHG

## 2022-08-08 DIAGNOSIS — Z20.822 COVID-19 RULED OUT: Primary | ICD-10-CM

## 2022-08-08 DIAGNOSIS — J06.9 UPPER RESPIRATORY TRACT INFECTION, UNSPECIFIED TYPE: ICD-10-CM

## 2022-08-08 LAB
SARS-COV-2 AG UPPER RESP QL IA: NEGATIVE
VALID CONTROL: NORMAL

## 2022-08-08 PROCEDURE — 99213 OFFICE O/P EST LOW 20 MIN: CPT | Performed by: FAMILY MEDICINE

## 2022-08-08 PROCEDURE — 87811 SARS-COV-2 COVID19 W/OPTIC: CPT | Performed by: FAMILY MEDICINE

## 2022-08-08 PROCEDURE — 87636 SARSCOV2 & INF A&B AMP PRB: CPT

## 2022-08-08 RX ORDER — GUAIFENESIN 100 MG/5ML
200 SYRUP ORAL 3 TIMES DAILY PRN
Qty: 120 ML | Refills: 0 | Status: SHIPPED | OUTPATIENT
Start: 2022-08-08

## 2022-08-08 NOTE — PROGRESS NOTES
Assessment/Plan:     ASSESSMENT:   - One week h/o of non-productive cough, congestion, myalgia,   - viral upper respiratory illness  - Not vaccinated for COVID-19 or Pneumococcal vaccine  - Denies fever, chills, SOB,   - Tylenol/Ibuprofen for headache, fever or pain  - start Robitussin 100 mg x3 daily for cough  - Denies chills, fever,   - humilifier  - Symptomatic therapy suggested: rest, gargle warm salt water, use acetaminophen, ibuprofen, antihistamine-decongestant of choice prn and return office visit prn if symptoms persist or worsen  Lack of antibiotic effectiveness discussed with her  Call or return to clinic prn if these symptoms worsen or fail to improve as anticipated  Subjective:     Patient ID: Denny Shah is a 65 y o  female  HPI  SUBJECTIVE:   Denny Shah is a 65 y o  female who complains of congestion, sneezing, dry cough, myalgias and headache for 7 days  She denies a history of chest pain, dizziness, fevers, nausea, shortness of breath, vomiting, weakness and weight loss and has a history of asthma  Patient denies smoke cigarettes  OBJECTIVE:  She appears well, vital signs are as noted  Ears normal   Throat and pharynx normal   Neck supple  No adenopathy in the neck  Nose is congested  Sinuses non tender  The chest is clear, without wheezes or rales  Review of Systems   Constitutional: Negative for chills and fever  HENT: Positive for congestion and sneezing  Negative for ear pain and sore throat  Eyes: Negative for pain and visual disturbance  Respiratory: Negative for cough, chest tightness, shortness of breath and wheezing  Cardiovascular: Negative for chest pain and palpitations  Gastrointestinal: Negative for abdominal pain, constipation, diarrhea, nausea and vomiting  Genitourinary: Negative for dysuria, frequency and hematuria  Musculoskeletal: Positive for myalgias  Negative for arthralgias and back pain     Skin: Negative for color change and rash  Neurological: Positive for headaches  Negative for dizziness, seizures, syncope and weakness  Psychiatric/Behavioral: Negative for sleep disturbance and suicidal ideas  All other systems reviewed and are negative  Objective:     Physical Exam  Constitutional:       Appearance: Normal appearance  HENT:      Head: Normocephalic and atraumatic  Right Ear: External ear normal       Left Ear: External ear normal       Nose: Congestion present  Mouth/Throat:      Mouth: Mucous membranes are moist    Eyes:      Extraocular Movements: Extraocular movements intact  Pupils: Pupils are equal, round, and reactive to light  Cardiovascular:      Rate and Rhythm: Normal rate and regular rhythm  Pulses: Normal pulses  Heart sounds: Normal heart sounds  No murmur heard  No friction rub  No gallop  Pulmonary:      Effort: Pulmonary effort is normal  No respiratory distress  Breath sounds: Normal breath sounds  No stridor  No wheezing or rhonchi  Chest:      Chest wall: No tenderness  Abdominal:      General: Bowel sounds are normal  There is no distension  Palpations: Abdomen is soft  Tenderness: There is no abdominal tenderness  There is no right CVA tenderness, left CVA tenderness or guarding  Musculoskeletal:         General: No swelling or tenderness  Normal range of motion  Cervical back: Neck supple  Right lower leg: No edema  Left lower leg: No edema  Skin:     General: Skin is warm and dry  Capillary Refill: Capillary refill takes less than 2 seconds  Findings: No lesion or rash  Neurological:      General: No focal deficit present  Mental Status: She is alert and oriented to person, place, and time     Psychiatric:         Mood and Affect: Mood normal

## 2022-08-08 NOTE — LETTER
August 8, 2022     Patient: Miguelina Villa [place]  YOB: 1965  Date of Visit: 8/8/2022      To Whom it May Concern:    Carolyn Reyna is under my professional care  Jonh Rg was seen in my office on 8/8/2022  Jonh Rg may return to work on 8/11/2022  Patient has a closed exposure to COVID-19  If you have any questions or concerns, please don't hesitate to call           Sincerely,          [facility]        CC: No Recipients

## 2022-08-08 NOTE — PATIENT INSTRUCTIONS
COVID-19 and Chronic Health Conditions   WHAT YOU NEED TO KNOW:   Your chronic condition may increase your risk for COVID-19 or serious problems it can cause  Healthcare providers might need to make changes that affect how you usually manage your chronic health condition  Providers may change hours of operation or not have patients come in to be seen  You may not be able to make appointments to get blood drawn or to have tests or procedures  This may continue until the virus that causes COVID-19 is controlled  Until then, you can take steps to manage your condition  The steps will also lower your risk for COVID-19 or the serious problems it causes  If you do develop COVID-19, healthcare providers will tell you when it is okay to be around others after you recover  This depends on your chronic condition, any symptoms of COVID-19 that developed, and how severe the symptoms were  DISCHARGE INSTRUCTIONS:   Call your local emergency number [phone] in the [address] [place] [address],[address]) or an emergency department if:   You have trouble breathing or shortness of breath  You have chest pain or pressure that lasts longer than 5 minutes  You become confused or hard to wake  Your lips or face are blue  Return to the emergency department if:   You have a fever of 104°F (40°C) or higher  Call your doctor or healthcare provider if:   You have symptoms of COVID-19  You have questions or concerns about COVID-19 or your chronic condition  What you need to know about serious problems from the virus:  You may develop long-term health problems caused by the virus  Your risk is higher if you are 65 or older  A weak immune system, obesity, diabetes, chronic kidney disease, or a heart or lung condition can also increase your risk  Your risk is also higher if you are a current or former cigarette smoker   COVID-19 can lead to any of the following:  Multisymptom inflammatory syndrome in adults (MIS-A) or in children (MIS-C), causing inflammation in the heart, digestive system, skin, or brain    Shortness of breath, serious lower respiratory conditions, such as pneumonia or acute respiratory distress syndrome (ARDS)    Blood clots or blood vessel damage    Organ damage from a lack of oxygen or from blood clots    Sleep problems    Problems thinking clearly, remembering information, or concentrating    Mood changes, depression, or anxiety    Long-term problems tasting or smelling    Loss of appetite and weight loss    Nerve pain    Fatigue (feeling mentally and physically tired)    How the 2019 coronavirus spreads: The virus spreads quickly and easily  The virus can be passed starting 2 to 3 days before symptoms begin or before a positive test if symptoms never begin  Droplets are the main way all coronaviruses spread  The virus travels in droplets that form when a person talks, sings, coughs, or sneezes  The droplets can also float in the air for minutes or hours  Infection happens when you breathe in the droplets or get them in your eyes or nose  Close personal contact with an infected person increases your risk for infection  This means being within 6 feet (2 meters) of the person for at least 15 minutes over 24 hours  Person-to-person contact can spread the virus  For example, a person with the virus on his or her hands can spread it by shaking hands with someone  The virus can stay on objects and surfaces for up to 3 days  You may become infected by touching the object or surface and then touching your eyes or mouth  Manage your chronic health condition during this time:  If you do not have a regular healthcare provider, experts recommend you contact a local [facility] health center or health department  The following can help you manage your condition and prevent COVID-19:  Get emergency care for your condition if needed  Talk to your healthcare providers about symptoms of your chronic condition that need immediate care   Your providers can help you create a plan or add exacerbation management to your plan  The plan will include when to go to an emergency department and when to call your local emergency number  This will depend on where you live and the services that are available during this time  Go to dialysis appointments as scheduled  It is important to stay on schedule  You will need to have enough food to be able to follow the emergency diet plan if you must miss a session  The emergency diet needs to be part of the management plan for your condition  Reschedule any upcoming appointments as needed  Medical facilities may be closed until the coronavirus is better controlled  This means you may need to reschedule a surgery, procedure, or check-up appointment  If you cannot have a phone or video appointment, you will need to make a new appointment  Some providers may be scheduling appointments several months in advance  Some surgeries and procedures will happen as scheduled  This depends on the medical condition and the reason for the surgery or procedure  You may need to have extra testing for COVID-19 several days before  Follow any regular management plan you use  Your healthcare provider will tell you if you need to make any changes to your regular management plan  For example, if you have asthma, continue to follow your asthma action plan  If you have diabetes, you may need to check your blood sugar level more often  Stress and illness can make blood sugar levels go up  You may need to adjust medicine such as insulin  If you have a heart condition or high blood pressure, you may need to check your blood pressure more often  Stress and illness can also raise your blood pressure  Talk to your healthcare providers about your medicines  You may be able to get more than 1 month of medicine at a time  This will lower the number of times you need to go to a pharmacy to get your medicines   Make sure you have enough medicine if you have a condition that can lead to an emergency  Examples include asthma medicines, insulin, or an epinephrine pen  Check the expiration dates on the medicines you currently have  Ask for refills as soon as possible, if needed  If it is not time to refill prescriptions, you may be able to get an emergency supply of some medicines  Medicine plans vary, so ask your healthcare provider or pharmacist for options  Have supplies available in your home  If possible, get extra supplies you use regularly  Examples include absorbent pads, syringes, and wound cleaning solutions  This will limit the number of trips out of your home to get supplies  Know the signs and symptoms of COVID-19  Signs and symptoms usually start about 5 days after infection but can take 2 to 14 days  Signs and symptoms range from mild to severe  You may feel like you have the flu or a bad cold  Your chronic health condition may cause some of the same symptoms COVID-19 causes  This can make it hard to know if a symptom is from COVID-19 or your chronic condition  Keep a record of any new or worsening symptom you have  This is especially important if you have a condition that often causes shortness of breath  Your provider can tell you if you should be tested for COVID-19  Tell your healthcare provider if you think you were infected but develop signs or symptoms not listed below:    A cough    Shortness of breath or trouble breathing that may become severe    A fever of at least 100 4°F, or 38°C (may be lower in adults 65 or older)    Chills that might include shaking    Muscle pain, body aches, or a headache    A sore throat    Suddenly not being able to taste or smell anything    Feeling very tired (fatigue)    Congestion (stuffy head and nose), or a runny nose    Diarrhea, nausea, or vomiting    What you can do to prevent having to go out of your home during this time:   Ask your healthcare provider for other ways to have appointments    Some providers offer phone, video, or other types of appointments  Have food, medicines, and other supplies delivered  Some pharmacies can send certain medicines to you through the mail  Grocery stores and restaurants may be able to deliver food and other items  If possible, have delivered items placed somewhere  Try not to have someone hand you an item  You will be so close to the person that the virus can spread between you  Ask someone to get items you need  The person can get groceries, medicines, or other needed items for you  Choose a person who does not have signs or symptoms of COVID-19 or has tested negative for it  The person should not be waiting for test results  He or she should not have a condition that increases the risk for COVID-19 or serious problems it causes  What you need to know about COVID-19 vaccines: Your healthcare provider can give you more information about what to expect, depending on your chronic condition  You are considered fully vaccinated against COVID-19 two weeks after the final dose of any COVID-19 vaccine  Let your healthcare provider know when you have received the final dose of the vaccine  Make a copy of your vaccination card  Keep the original with you in case you need to show it  Keep the copy in a safe place  COVID-19 vaccines are given as a shot in 1 or 2 doses  Vaccination is recommended for everyone 5 years or older  One 2-dose vaccine is fully approved  for those 16 years or older  This vaccine also has an emergency use authorization (EUA) for children 11to 13years old  No vaccine is currently available for children younger than 5 years  A booster (additional) dose  is given to help the immune system continue to protect against severe COVID-19  A booster is recommended for all adults 18 or older  The booster can be a different brand of the COVID-19 vaccine than you originally received   The timing for the booster depends on the type of vaccine you received:    1-dose vaccine: The booster is given at least 2 months after you received the vaccine  2-dose vaccine: The booster is given at least 5 or 6 months after the second dose  A booster can be given to adolescents 15to 16years old  Only 1 COVID-19 vaccine has this EUA  The booster is given at least 5 months after the second dose of the original vaccine series  A booster is recommended for immunocompromised children 11to 6years old  Only 1 COVID-19 vaccine has this EUA  The booster is given 28 days after the second dose  Continue social distancing and other measures, even after you get the vaccine  Although it is not common, you can become infected after you get the vaccine  You may also be able to pass the virus to others without knowing you are infected  After you get the vaccine, check local, national, and international travel rules  You may need to be tested before you travel  Some countries require proof of a negative test before you travel  You may also need to quarantine after you return  Medicine may be given to prevent infection  The medicine can be given if you are at high risk for infection and cannot get the vaccine  It can also be given if your immune system does not respond well to the vaccine  Lower your risk for COVID-19:   Wash your hands often throughout the day  Use soap and water  Rub your soapy hands together, lacing your fingers, for at least 20 seconds  Rinse with warm, running water  Dry your hands with a clean towel or paper towel  Use hand  that contains alcohol if soap and water are not available  Teach children how to wash their hands and use hand   Cover sneezes and coughs  Turn your face away and cover your mouth and nose with a tissue  Throw the tissue away  Use the bend of your arm if a tissue is not available  Then wash your hands with soap and water or use hand    Teach children how to cover a cough or sneeze  Follow worldwide, national, and local social distancing guidelines  Keep at least 6 feet (2 meters) between you and others  Wear a face covering (mask) around anyone who does not live in your home  Use a cloth covering with at least 2 layers  You can also create layers by putting a cloth covering over a disposable non-medical mask  Cover your mouth and your nose  Try not to touch your face  If you get the virus on your hands, you can transfer it to your eyes, nose, or mouth and become infected  You can also transfer it to objects, surfaces, or people  Clean and disinfect high-touch surfaces and objects in your home often  Use disinfecting wipes, or make a solution by mixing 4 teaspoons of bleach with 1 quart (4 cups) of water  Do not  use any cleaning or disinfecting products that can trigger an asthma attack or other breathing problems  Open windows or have circulating air as you clean  Do not  mix ammonia with bleach  This will create toxic fumes  How to follow social distancing guidelines:  National and local social distancing rules vary  Rules and restrictions may change over time as restrictions are lifted  The following are general guidelines:  Stay home if you are sick or think you may have COVID-19  It is important to stay home if you are waiting for a testing appointment or for test results  Avoid close physical contact with anyone who does not live in your home  Do not shake hands with, hug, or kiss a person as a greeting  If you must use public transportation (such as a bus or subway), try to sit or stand away from others  Wear your face covering  Avoid in-person gatherings and crowds  Attend virtually if possible  Help strengthen your immune system:   Ask about other vaccines you may need  Get the influenza (flu) vaccine as soon as recommended each year, usually starting in September or October  Get the pneumonia vaccine if recommended   Your healthcare provider can tell you if you should also get other vaccines, and when to get them  Do not smoke  Nicotine and other chemicals in cigarettes and cigars can increase your risk for infection and for serious COVID-19 effects  Ask your healthcare provider for information if you currently smoke and need help to quit  E-cigarettes or smokeless tobacco still contain nicotine  Talk to your healthcare provider before you use these products  Eat a variety of healthy foods  Examples include vegetables, fruits, whole-grain breads and cereals, lean meats and poultry, fish, low-fat dairy products, and cooked beans  Healthy foods contain nutrients that help keep your immune system strong  Find ways to manage stress  You may be feeling more stressed than usual because of the COVID-19 outbreak  The situation is very stressful to many people  Talk to your healthcare providers about ways to manage stress during this time  Stress can lead to breathing problems or make the problems worse  Stress can trigger an attack or exacerbation of many health conditions  It is important to do things that help you feel more relaxed, such as the following:     Pick 1 or 2 times a day to watch the news  Constant news watching can increase your stress levels  Talk to a friend on the phone or through a video chat  Take a warm, soothing bath  Listen to music  Exercise can also help relieve stress  This may be hard if your regular gym or outdoor exercise area is closed  If you do not have exercise equipment at home, try walking inside your home  You can walk quickly or turn on music and dance  Follow up with your doctor or healthcare provider as directed: Your providers will tell you when you can come in for tests, procedures, or check-ups  Bring your symptom record with you to all appointments  Write down your questions so you remember to ask them during your visits    For more information:   [facility]  [address] , [address]  Phone: [phone] - [phone]  Phone: 9- 205 [phone]  Web Address: [employer]    © [address] [place] [address] 2022 Information is for End User's use only and may not be sold, redistributed or otherwise used for commercial purposes  All illustrations and images included in CareNotes® are the copyrighted property of [employer]  or [facility] Nilson Juárez   The above information is an  only  It is not intended as medical advice for individual conditions or treatments  Talk to your doctor, nurse or pharmacist before following any medical regimen to see if it is safe and effective for you

## 2022-08-09 LAB
FLUAV RNA RESP QL NAA+PROBE: NEGATIVE
FLUBV RNA RESP QL NAA+PROBE: NEGATIVE
SARS-COV-2 RNA RESP QL NAA+PROBE: POSITIVE

## 2022-08-10 ENCOUNTER — TELEPHONE (OUTPATIENT)
Dept: FAMILY MEDICINE CLINIC | Facility: CLINIC | Age: 57
End: 2022-08-10

## 2022-08-10 NOTE — TELEPHONE ENCOUNTER
Patient called and saw the test on my chart saying she is positive for covid and she needs an updated note for work not to return for tomorrow  Patient is asking for medication that can help her, now that she is positive  She would like a call back  Patient not feeling any better

## 2022-08-11 ENCOUNTER — TELEPHONE (OUTPATIENT)
Dept: FAMILY MEDICINE CLINIC | Facility: CLINIC | Age: 57
End: 2022-08-11

## 2022-08-11 DIAGNOSIS — U07.1 COVID-19: Primary | ICD-10-CM

## 2022-08-11 NOTE — PROGRESS NOTES
Patient is a 64 yo F with PMHx of Asthma, obesity who presented to the clinic on Monday 8/82022 with URI symptoms  Patient tested negative on the rapid covid test in the office  However, patient tested posted on the lab covid test  Patient is requesting Paxlovid covid antiviral medication and a letter for work       - will order Paxlovid for patient   - will send patient letter for work

## 2022-09-16 ENCOUNTER — HOSPITAL ENCOUNTER (OUTPATIENT)
Dept: MAMMOGRAPHY | Facility: CLINIC | Age: 57
End: 2022-09-16
Payer: COMMERCIAL

## 2022-09-16 VITALS — BODY MASS INDEX: 30.78 KG/M2 | HEIGHT: 61 IN | WEIGHT: 163 LBS

## 2022-09-16 DIAGNOSIS — Z12.31 ENCOUNTER FOR SCREENING MAMMOGRAM FOR MALIGNANT NEOPLASM OF BREAST: ICD-10-CM

## 2022-09-16 PROCEDURE — 77067 SCR MAMMO BI INCL CAD: CPT

## 2022-09-16 PROCEDURE — 77063 BREAST TOMOSYNTHESIS BI: CPT

## 2022-09-22 ENCOUNTER — OFFICE VISIT (OUTPATIENT)
Dept: FAMILY MEDICINE CLINIC | Facility: CLINIC | Age: 57
End: 2022-09-22

## 2022-09-22 VITALS
HEART RATE: 83 BPM | HEIGHT: 61 IN | RESPIRATION RATE: 18 BRPM | DIASTOLIC BLOOD PRESSURE: 78 MMHG | TEMPERATURE: 97.5 F | WEIGHT: 165 LBS | BODY MASS INDEX: 31.15 KG/M2 | OXYGEN SATURATION: 99 % | SYSTOLIC BLOOD PRESSURE: 140 MMHG

## 2022-09-22 DIAGNOSIS — Z00.00 HEALTHCARE MAINTENANCE: ICD-10-CM

## 2022-09-22 DIAGNOSIS — R73.03 PREDIABETES: ICD-10-CM

## 2022-09-22 DIAGNOSIS — K21.9 GASTROESOPHAGEAL REFLUX DISEASE WITHOUT ESOPHAGITIS: Primary | ICD-10-CM

## 2022-09-22 DIAGNOSIS — Z23 ENCOUNTER FOR IMMUNIZATION: ICD-10-CM

## 2022-09-22 PROBLEM — M25.531 RIGHT WRIST PAIN: Status: ACTIVE | Noted: 2022-09-22

## 2022-09-22 LAB — SL AMB POCT HEMOGLOBIN AIC: 5.9 (ref ?–6.5)

## 2022-09-22 PROCEDURE — 99213 OFFICE O/P EST LOW 20 MIN: CPT | Performed by: FAMILY MEDICINE

## 2022-09-22 PROCEDURE — 90471 IMMUNIZATION ADMIN: CPT | Performed by: FAMILY MEDICINE

## 2022-09-22 PROCEDURE — 83036 HEMOGLOBIN GLYCOSYLATED A1C: CPT | Performed by: FAMILY MEDICINE

## 2022-09-22 PROCEDURE — 90677 PCV20 VACCINE IM: CPT | Performed by: FAMILY MEDICINE

## 2022-09-22 RX ORDER — OMEPRAZOLE 20 MG/1
CAPSULE, DELAYED RELEASE ORAL
Qty: 30 CAPSULE | Refills: 2 | Status: SHIPPED | OUTPATIENT
Start: 2022-09-22

## 2022-09-22 NOTE — ASSESSMENT & PLAN NOTE
· Recurrent symptoms over the course of the last 2 years   7/2022: H pylori stool test negative   Had an EGD in 2020: Regular Z line  No evidence of Magaña's esophagus  Normal stomach  Biopsied-Antral and oxyntic mucosa with mild chronic inactive gastritis, ultimately benign by GI  Continue Omeprazole 20 mg daily for 6-8 weeks and will then reassess   Advised to take PPI 30 min before eating  If Protontix does not alleviate symptoms after 8-12 weeks consider other PPI's or H2 blockers  If no alleviation of symptoms will refer back to GI   Can use antacid tablets for episodic heartburn flares

## 2022-09-22 NOTE — ASSESSMENT & PLAN NOTE
· Discussed risks/benefits COVID vaccine; still contemplating at this time  · Cervical Cancer screening UTD-negative PAP; repeat   · Colonoscopy UTD 2020-no abnormalities, repeat 2030  · Mammogram 9/2022: No mammographic evidence of malignancy  · Discussed Pneumococcal Vaccination in the setting of Asthma-will consider

## 2022-09-22 NOTE — PROGRESS NOTES
[facility]   [address], [address]  Phone#  [phone]  Fax#  [phone]    ASSESSMENT and PLAN  Healthcare maintenance  · Discussed risks/benefits COVID vaccine; still contemplating at this time  · Cervical Cancer screening UTD-negative PAP; repeat   · Colonoscopy UTD 2020-no abnormalities, repeat 2030  · Mammogram 9/2022: No mammographic evidence of malignancy  · Discussed Pneumococcal Vaccination in the setting of Asthma-will consider    Prediabetes  · A1C-5 9  · Continue to monitor  · Dietary habits and exercise discussed  · Pneumococcal vaccine administered today  · Will check A1C in 6 months    Gastroesophageal reflux disease without esophagitis  · Recurrent symptoms over the course of the last 2 years   7/2022: H pylori stool test negative   Had an EGD in 2020: Regular Z line  No evidence of Magaña's esophagus  Normal stomach  Biopsied-Antral and oxyntic mucosa with mild chronic inactive gastritis, ultimately benign by GI  Continue Omeprazole 20 mg daily for 6-8 weeks and will then reassess  Advised to take PPI 30 min before eating  If Protontix does not alleviate symptoms after 8-12 weeks consider other PPI's or H2 blockers  If no alleviation of symptoms will refer back to GI   Can use antacid tablets for episodic heartburn flares       Right wrist pain  · Secondary to overuse from using her computer at work  · Recommended conservative therapy  · Recommend buying an ergonomic mouse  · NSAID's prn  · Has brace at home; recommend wearing that while working  · No concerns for carpal tunnel or other wrist pathology  · No imaging at this time    Diagnoses and all orders for this visit:    Gastroesophageal reflux disease without esophagitis  -     omeprazole (PriLOSEC) 20 mg delayed release capsule;  Take one capsule (20 mg) daily x 2 weeks then take one capsule (20 mg) every other day for 2 weeks, then stop    Healthcare maintenance    Prediabetes  - POCT hemoglobin A1c    Encounter for immunization  -     Pneumococcal Conjugate Vaccine 20-valent (Pcv20)      HISTORY OF PRESENT ILLNESS  Nafisa Zapata is a 65 y o  female with a significant PMHx of Prediabetes, GERD, Asthma, who presents to the clinic for  management of their chronic medical conditions  Patient's medical conditions are stable unless noted otherwise above  Patient has not had any recent hospitalizations, or medical emergencies since last visit  Patient has no further complaints other than what is mentioned in the ROS  REVIEW OF SYSTEMS  Review of Systems   Constitutional: Negative for chills, fatigue and fever  HENT: Negative for sore throat  Respiratory: Negative for cough, chest tightness and shortness of breath  Cardiovascular: Negative for chest pain and leg swelling  Gastrointestinal: Negative for constipation, diarrhea, nausea and vomiting  Endocrine: Negative for polydipsia, polyphagia and polyuria  Genitourinary: Negative for dysuria  Musculoskeletal: Positive for joint swelling  Negative for arthralgias  Skin: Negative for rash  Neurological: Negative for dizziness, light-headedness and headaches  Psychiatric/Behavioral: Negative for agitation and behavioral problems  PAST MEDICAL HISTORY   Past Medical History:   Diagnosis Date    Asthma     GERD (gastroesophageal reflux disease)     Prediabetes     Vitamin D deficiency 2017     Past Surgical History:   Procedure Laterality Date    NO PAST SURGERIES       Social History     Socioeconomic History    Marital status: /Civil Union     Spouse name: Not on file    Number of children: Not on file    Years of education: Not on file    Highest education level: Not on file   Occupational History    Not on file   Tobacco Use    Smoking status: Never Smoker    Smokeless tobacco: Never Used   Vaping Use    Vaping Use: Never used   Substance and Sexual Activity    Alcohol use:  Yes    Drug use: Never    Sexual activity: Yes     Partners: Male     Birth control/protection: Post-menopausal   Other Topics Concern    Not on file   Social History Narrative    No preference on Congregation beliefs      Social Determinants of Health     Financial Resource Strain: Low Risk     Difficulty of Paying Living Expenses: Not hard at all   Food Insecurity: No Food Insecurity    Worried About Running Out of Food in the Last Year: Never true    Barb of Food in the Last Year: Never true   Transportation Needs: No Transportation Needs    Lack of Transportation (Medical): No    Lack of Transportation (Non-Medical):  No   Physical Activity: Not on file   Stress: Not on file   Social Connections: Not on file   Intimate Partner Violence: Not on file   Housing Stability: Not on file     Family History   Problem Relation Age of Onset    No Known Problems Mother     No Known Problems Father     No Known Problems Daughter     Dementia Maternal Grandmother         Without behavioral disturbance, unspecified dementia type     Heart failure Maternal Grandmother     Hypertension Maternal Grandmother     Diabetes type II Maternal Grandmother     No Known Problems Paternal Grandfather     Breast cancer Maternal Aunt         62    Breast cancer Maternal Aunt         62s    No Known Problems Maternal Aunt     Colon cancer Neg Hx     Ovarian cancer Neg Hx        MEDICATIONS    Current Outpatient Medications:     omeprazole (PriLOSEC) 20 mg delayed release capsule, Take one capsule (20 mg) daily x 2 weeks then take one capsule (20 mg) every other day for 2 weeks, then stop, Disp: 30 capsule, Rfl: 2    albuterol (Ventolin HFA) 90 mcg/act inhaler, Inhale 2 puffs every 6 (six) hours as needed for wheezing, Disp: 18 g, Rfl: 5    famotidine (PEPCID) 20 mg tablet, Take 1 tablet (20 mg total) by mouth 2 (two) times a day, Disp: 90 tablet, Rfl: 1    guaiFENesin (ROBITUSSIN) 100 MG/5ML oral liquid, Take 10 mL (200 mg total) by mouth 3 (three) times a day as needed for cough, Disp: 120 mL, Rfl: 0    lidocaine (Lidoderm) 5 %, Apply 1 patch topically daily Remove & Discard patch within 12 hours or as directed by MD, Disp: 15 patch, Rfl: 1    PHYSICAL EXAM  Vitals:    09/22/22 1403   BP: 140/78   BP Location: Left arm   Patient Position: Sitting   Cuff Size: Adult   Pulse: 83   Resp: 18   Temp: 97 5 °F (36 4 °C)   TempSrc: Temporal   SpO2: 99%   Weight: 74 8 kg (165 lb)   Height: 5' 1" (1 549 m)     Wt Readings from Last 3 Encounters:   09/22/22 74 8 kg (165 lb)   09/16/22 73 9 kg (163 lb)   08/08/22 73 9 kg (163 lb)    , Body mass index is 31 18 kg/m²  Physical Exam  Constitutional:       Appearance: She is well-developed  HENT:      Head: Normocephalic and atraumatic  Eyes:      Pupils: Pupils are equal, round, and reactive to light  Cardiovascular:      Rate and Rhythm: Normal rate and regular rhythm  Heart sounds: Normal heart sounds  Pulmonary:      Effort: Pulmonary effort is normal       Breath sounds: Normal breath sounds  Abdominal:      General: Bowel sounds are normal       Palpations: Abdomen is soft  Musculoskeletal:         General: Normal range of motion  Right wrist: Normal  No swelling, tenderness or bony tenderness  Normal range of motion  Left wrist: Normal  No swelling or bony tenderness  Normal range of motion  Cervical back: Normal range of motion and neck supple  Skin:     General: Skin is warm  Findings: No erythema or rash  Neurological:      Mental Status: She is alert and oriented to person, place, and time  Psychiatric:         Behavior: Behavior normal          LABS/IMAGING  Hemoglobin A1C   Date Value Ref Range Status   09/22/2022 5 9 6 5 Final   03/22/2022 5 7 (H) Normal 3 8-5 6%; PreDiabetic 5 7-6 4%;  Diabetic >=6 5%; Glycemic control for adults with diabetes <7 0% % Final     HDL, Direct   Date Value Ref Range Status   03/22/2022 53 >=50 mg/dL Final Comment:     Specimen collection should occur prior to Metamizole administration due to the potential for falsley depressed results  Triglycerides   Date Value Ref Range Status   03/22/2022 108 See Comment mg/dL Final     Comment:     Triglyceride:     0-9Y            <75mg/dL     10Y-17Y         <90 mg/dL       >=18Y     Normal          <150 mg/dL     Borderline High 150-199 mg/dL     High            200-499 mg/dL        Very High       >499 mg/dL    Specimen collection should occur prior to N-Acetylcysteine or Metamizole administration due to the potential for falsely depressed results        WBC   Date Value Ref Range Status   03/22/2022 5 14 4 31 - 10 16 Thousand/uL Final   08/01/2017 6 04 4 31 - 10 16 Thousand/uL Final   10/14/2014 6 16 4 31 - 10 16 Thousand/uL Final     Hemoglobin   Date Value Ref Range Status   03/22/2022 12 1 11 5 - 15 4 g/dL Final   08/01/2017 11 7 11 5 - 15 4 g/dL Final   10/14/2014 12 4 11 5 - 15 4 g/dL Final     Platelets   Date Value Ref Range Status   03/22/2022 349 149 - 390 Thousands/uL Final   08/01/2017 338 149 - 390 Thousands/uL Final   10/14/2014 322 149 - 390 Thousand/uL Final     Potassium   Date Value Ref Range Status   03/22/2022 3 6 3 5 - 5 3 mmol/L Final   07/07/2021 3 9 3 5 - 5 3 mmol/L Final   06/08/2020 4 2 3 5 - 5 3 mmol/L Final   10/14/2014 3 9 3 6 - 5 0 mmol/L Final     Chloride   Date Value Ref Range Status   03/22/2022 110 (H) 100 - 108 mmol/L Final   07/07/2021 106 100 - 108 mmol/L Final   06/08/2020 109 (H) 100 - 108 mmol/L Final   10/14/2014 105 101 - 111 mmol/L Final     CO2   Date Value Ref Range Status   03/22/2022 26 21 - 32 mmol/L Final   07/07/2021 27 21 - 32 mmol/L Final   06/08/2020 26 21 - 32 mmol/L Final   10/14/2014 25 21 - 31 mmol/L Final     Anion Gap   Date Value Ref Range Status   10/14/2014 12 4 - 13 mmol/L Final     BUN   Date Value Ref Range Status   03/22/2022 10 5 - 25 mg/dL Final   07/07/2021 15 5 - 25 mg/dL Final   06/08/2020 13 5 - 25 mg/dL Final   10/14/2014 11 5 - 27 mg/dL Final     Creatinine   Date Value Ref Range Status   03/22/2022 0 85 0 60 - 1 30 mg/dL Final     Comment:     Standardized to IDMS reference method   07/07/2021 0 91 0 60 - 1 30 mg/dL Final     Comment:     Standardized to IDMS reference method   06/08/2020 0 83 0 60 - 1 30 mg/dL Final     Comment:     Standardized to IDMS reference method   10/14/2014 0 85 0 60 - 1 30 mg/dL Final     Comment:     Standardized to IDMS reference method     eGFR   Date Value Ref Range Status   03/22/2022 77 ml/min/1 73sq m Final   07/07/2021 82 ml/min/1 73sq m Final   06/08/2020 93 ml/min/1 73sq m Final     Glucose   Date Value Ref Range Status   10/14/2014 106 65 - 140 mg/dL Final     Comment:     If patient is fasting, the ADA then defines impaired fasting glucose as  >100 mg/dl and diabetes as  >or equal to 126 mg/dl  Calcium   Date Value Ref Range Status   03/22/2022 9 2 8 3 - 10 1 mg/dL Final   07/07/2021 9 4 8 3 - 10 1 mg/dL Final   06/08/2020 8 9 8 3 - 10 1 mg/dL Final   10/14/2014 9 2 8 3 - 10 1 mg/dL Final     AST   Date Value Ref Range Status   03/22/2022 18 5 - 45 U/L Final     Comment:     Specimen collection should occur prior to Sulfasalazine administration due to the potential for falsely depressed results  07/07/2021 24 5 - 45 U/L Final     Comment:     Specimen collection should occur prior to Sulfasalazine administration due to the potential for falsely depressed results  06/08/2020 18 5 - 45 U/L Final     Comment:       Specimen collection should occur prior to Sulfasalazine administration due to the potential for falsely depressed results  10/14/2014 22 10 - 42 U/L Final     ALT   Date Value Ref Range Status   03/22/2022 23 12 - 78 U/L Final     Comment:     Specimen collection should occur prior to Sulfasalazine and/or Sulfapyridine administration due to the potential for falsely depressed results      07/07/2021 28 12 - 78 U/L Final     Comment:     Specimen collection should occur prior to Sulfasalazine and/or Sulfapyridine administration due to the potential for falsely depressed results  06/08/2020 25 12 - 78 U/L Final     Comment:       Specimen collection should occur prior to Sulfasalazine and/or Sulfapyridine administration due to the potential for falsely depressed results  10/14/2014 24 6 - 78 U/L Final     Alkaline Phosphatase   Date Value Ref Range Status   03/22/2022 95 46 - 116 U/L Final   07/07/2021 120 (H) 46 - 116 U/L Final   06/08/2020 98 46 - 116 U/L Final   10/14/2014 122 (H) 42 - 121 U/L Final     Total Protein   Date Value Ref Range Status   10/14/2014 8 5 (H) 6 4 - 8 2 g/dL Final     Total Bilirubin   Date Value Ref Range Status   10/14/2014 0 3 0 2 - 1 0 mg/dL Final     No results found for: TSH    This note has been dictated using Easycause software  It may contain errors, including improperly dictated words  Please contact physician directly for any questions       Courtney August MD   PGY-3

## 2022-09-22 NOTE — ASSESSMENT & PLAN NOTE
· A1C-5 9  · Continue to monitor  · Dietary habits and exercise discussed  · Pneumococcal vaccine administered today  · Will check A1C in 6 months

## 2022-09-22 NOTE — ASSESSMENT & PLAN NOTE
· Secondary to overuse from using her computer at work  · Recommended conservative therapy  · Recommend buying an ergonomic mouse  · NSAID's prn  · Has brace at home; recommend wearing that while working  · No concerns for carpal tunnel or other wrist pathology     · No imaging at this time

## 2022-10-11 PROBLEM — Z00.00 HEALTHCARE MAINTENANCE: Status: RESOLVED | Noted: 2022-04-13 | Resolved: 2022-10-11
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
## 2022-11-28 ENCOUNTER — OFFICE VISIT (OUTPATIENT)
Dept: FAMILY MEDICINE CLINIC | Facility: CLINIC | Age: 57
End: 2022-11-28

## 2022-11-28 VITALS
SYSTOLIC BLOOD PRESSURE: 118 MMHG | HEIGHT: 61 IN | TEMPERATURE: 101 F | DIASTOLIC BLOOD PRESSURE: 82 MMHG | OXYGEN SATURATION: 97 % | BODY MASS INDEX: 30.93 KG/M2 | WEIGHT: 163.8 LBS | RESPIRATION RATE: 18 BRPM | HEART RATE: 103 BPM

## 2022-11-28 DIAGNOSIS — B34.9 VIRAL SYNDROME: Primary | ICD-10-CM

## 2022-11-28 RX ORDER — ACETAMINOPHEN 500 MG
500 TABLET ORAL EVERY 6 HOURS PRN
Qty: 30 TABLET | Refills: 0 | Status: SHIPPED | OUTPATIENT
Start: 2022-11-28

## 2022-11-28 RX ORDER — GUAIFENESIN/DEXTROMETHORPHAN 100-10MG/5
5 SYRUP ORAL 3 TIMES DAILY PRN
Qty: 354 ML | Refills: 0 | Status: SHIPPED | OUTPATIENT
Start: 2022-11-28 | End: 2022-12-06

## 2022-11-28 RX ORDER — LORATADINE 10 MG/1
10 TABLET ORAL DAILY
Qty: 30 TABLET | Refills: 0 | Status: SHIPPED | OUTPATIENT
Start: 2022-11-28 | End: 2022-12-06

## 2022-11-28 NOTE — LETTER
November 28, 2022     Patient: Brandon Galeas [place]  YOB: 1965  Date of Visit: 11/28/2022      To Whom it May Concern:    Ramseyeloy Jovanna is under my professional care  Nelson Levine was seen in my office on 11/28/2022  Nelson Levine may return to work on once we have had a follow up  If you have any questions or concerns, please don't hesitate to call           Sincerely,          [address]

## 2022-11-28 NOTE — LETTER
November 28, 2022     Patient: Beba Albright [place]  YOB: 1965  Date of Visit: 11/28/2022      To Whom it May Concern:    Quinton Kyra is under my professional care  Germaniawancmagan Mobley was seen in my office on 11/28/2022  Guadalupe Mobley should isolate for the next 3-5 days until COVID labs result  Continue to isolate until 5th day (11/31/2022) if COVID is positive  If you have any questions or concerns, please don't hesitate to call           Sincerely,          Isabel [address]

## 2022-11-28 NOTE — LETTER
November 28, 2022     Patient: Priyanka Luna [place]  YOB: 1965  Date of Visit: 11/28/2022      To Whom it May Concern:    Daysi Gonzalez is under my professional care  Ronnald Holstein was seen in my office on 11/28/2022  Ronnald Holstein may return to work once we have had a follow up in the next 1 week  If you have any questions or concerns, please don't hesitate to call           Sincerely,   Carlos Delgado, [phone]

## 2022-11-28 NOTE — ASSESSMENT & PLAN NOTE
Day 2 of symptoms  -Will send for COVID/RSV testing  -Isolate until COVID results, continue until 5 days or until 24h after resolution without fever reducing medication  -Tylenol as needed, max 3-4g/day for fever, headache, and myalgia  -Albuterol q6h prn if chest tightness or SOB  -Will send claritin as well given reported possible seasonal allergy  -Guaifenesin-dextromethorphan syrup for cough and congestion  -Work note provided  -Follow up in 5-7 days for assessment to return to work   -E  D  precautions given

## 2022-11-28 NOTE — PROGRESS NOTES
Name: Felisa Brown      : 1965      MRN: [id number]  Encounter Provider: [address]  Encounter Date: 2022   Encounter department: [address] [place] [address]  Viral syndrome  Assessment & Plan:  Day 2 of symptoms  -Will send for COVID/RSV testing  -Isolate until COVID results, continue until 5 days or until 24h after resolution without fever reducing medication  -Tylenol as needed, max 3-4g/day for fever, headache, and myalgia  -Albuterol q6h prn if chest tightness or SOB  -Will send claritin as well given reported possible seasonal allergy  -Guaifenesin-dextromethorphan syrup for cough and congestion  -Work note provided  -Follow up in 5-7 days for assessment to return to work   -E  D  precautions given  Orders:  -     dextromethorphan-guaiFENesin (ROBITUSSIN DM)  mg/5 mL syrup; Take 5 mL by mouth 3 (three) times a day as needed for cough or congestion  -     Covid/Flu- Office Collect  -     loratadine (CLARITIN) 10 mg tablet; Take 1 tablet (10 mg total) by mouth daily  -     acetaminophen (TYLENOL) 500 mg tablet; Take 1 tablet (500 mg total) by mouth every 6 (six) hours as needed for mild pain      Subjective      HPI   Patient presents with 2 days of chills occasionally, fever, Tmax 101F, cough productive mildly with yellow mucus, has used albuterol 1x yesterday due to chest tightness although does not feel like an asthma attack  Has associated body aches  Denies sick contact but went to the [facility] a few hours 3 days prior to symptom onset  Using tylenol 1000mg q6 hours and guaifenesin       Review of Systems  As stated in HPI    Current Outpatient Medications on File Prior to Visit   Medication Sig   • albuterol (Ventolin HFA) 90 mcg/act inhaler Inhale 2 puffs every 6 (six) hours as needed for wheezing   • guaiFENesin (ROBITUSSIN) 100 MG/5ML oral liquid Take 10 mL (200 mg total) by mouth 3 (three) times a day as needed for cough   • lidocaine (Lidoderm) 5 % Apply 1 patch topically daily Remove & Discard patch within 12 hours or as directed by MD   • omeprazole (PriLOSEC) 20 mg delayed release capsule Take one capsule (20 mg) daily x 2 weeks then take one capsule (20 mg) every other day for 2 weeks, then stop   • [DISCONTINUED] famotidine (PEPCID) 20 mg tablet Take 1 tablet (20 mg total) by mouth 2 (two) times a day       Objective     /82 (BP Location: Left arm, Patient Position: Sitting, Cuff Size: Standard)   Pulse 103   Temp (!) 101 °F (38 3 °C) (Temporal)   Resp 18   Ht 5' 1" (1 549 m)   Wt 74 3 kg (163 lb 12 8 oz)   SpO2 97%   BMI 30 95 kg/m²     Physical Exam  Vitals reviewed  Constitutional:       General: She is not in acute distress  Appearance: Normal appearance  HENT:      Nose: Congestion present  Eyes:      Conjunctiva/sclera: Conjunctivae normal    Neck:      Thyroid: No thyromegaly  Cardiovascular:      Rate and Rhythm: Normal rate and regular rhythm  Heart sounds: Normal heart sounds  No murmur heard  Pulmonary:      Effort: Pulmonary effort is normal  No respiratory distress  Breath sounds: Normal breath sounds  No wheezing  Abdominal:      General: Bowel sounds are normal  There is no distension  Palpations: Abdomen is soft  Tenderness: There is no abdominal tenderness  Musculoskeletal:         General: No swelling  Cervical back: Neck supple  Right lower leg: No edema  Left lower leg: No edema  Lymphadenopathy:      Cervical: No cervical adenopathy  Skin:     General: Skin is warm  Capillary Refill: Capillary refill takes less than 2 seconds  Neurological:      Mental Status: She is alert and oriented to person, place, and time              [address] [place] [address]

## 2022-11-29 LAB
FLUAV RNA RESP QL NAA+PROBE: POSITIVE
FLUBV RNA RESP QL NAA+PROBE: NEGATIVE
SARS-COV-2 RNA RESP QL NAA+PROBE: NEGATIVE

## 2022-11-30 NOTE — RESULT ENCOUNTER NOTE
Patient informed of result of flu positive  Continue conservative management and will see at next appointment

## 2022-12-02 ENCOUNTER — OFFICE VISIT (OUTPATIENT)
Dept: FAMILY MEDICINE CLINIC | Facility: CLINIC | Age: 57
End: 2022-12-02

## 2022-12-02 VITALS
WEIGHT: 163 LBS | HEART RATE: 91 BPM | SYSTOLIC BLOOD PRESSURE: 122 MMHG | HEIGHT: 61 IN | TEMPERATURE: 98.7 F | OXYGEN SATURATION: 99 % | BODY MASS INDEX: 30.78 KG/M2 | RESPIRATION RATE: 16 BRPM | DIASTOLIC BLOOD PRESSURE: 80 MMHG

## 2022-12-02 DIAGNOSIS — B34.9 VIRAL SYNDROME: Primary | ICD-10-CM

## 2022-12-02 RX ORDER — BENZONATATE 100 MG/1
100 CAPSULE ORAL 3 TIMES DAILY PRN
Qty: 20 CAPSULE | Refills: 0 | Status: SHIPPED | OUTPATIENT
Start: 2022-12-02 | End: 2022-12-06

## 2022-12-02 NOTE — LETTER
December 2, 2022     Patient: Andrea Huizar  YOB: 1965  Date of Visit: 12/2/2022      To Whom it May Concern:    Andrea Huizar is under my professional care  Greenwoodcatrina Orellana was seen in my office on 12/2/2022  Greenwoodcatrina Orellana may return to work on 12/3/2022 without restrictions       If you have any questions or concerns, please don't hesitate to call           Sincerely,        Evita Cadet MD

## 2022-12-02 NOTE — PROGRESS NOTES
Name: Theo Molina      : 1965      MRN: [id number]  Encounter Provider: Bishop Ravi MD  Encounter Date: 2022   Encounter department: [address] [place] [address]  Viral syndrome  Assessment & Plan:  Continue hydration and conservative management  Return to work letter given     Orders:  -     benzonatate (TESSALON PERLES) 100 mg capsule; Take 1 capsule (100 mg total) by mouth 3 (three) times a day as needed for cough    Subjective      HPI   Patient presents today for follow up of viral illness  She tested positive for flu on send out testing  She is doing well and recovering, has residual cough  She reports mild diarrhea yesterday and today, but staying hydrated and without blood, fever or abdominal pain  On further assessment, she has been drinking a tea which contains probiotics       Review of Systems  Denies fever, abdominal pain, chest pain, SOB    Current Outpatient Medications on File Prior to Visit   Medication Sig   • acetaminophen (TYLENOL) 500 mg tablet Take 1 tablet (500 mg total) by mouth every 6 (six) hours as needed for mild pain   • albuterol (Ventolin HFA) 90 mcg/act inhaler Inhale 2 puffs every 6 (six) hours as needed for wheezing   • dextromethorphan-guaiFENesin (ROBITUSSIN DM)  mg/5 mL syrup Take 5 mL by mouth 3 (three) times a day as needed for cough or congestion   • guaiFENesin (ROBITUSSIN) 100 MG/5ML oral liquid Take 10 mL (200 mg total) by mouth 3 (three) times a day as needed for cough   • lidocaine (Lidoderm) 5 % Apply 1 patch topically daily Remove & Discard patch within 12 hours or as directed by MD   • loratadine (CLARITIN) 10 mg tablet Take 1 tablet (10 mg total) by mouth daily   • omeprazole (PriLOSEC) 20 mg delayed release capsule Take one capsule (20 mg) daily x 2 weeks then take one capsule (20 mg) every other day for 2 weeks, then stop       Objective     /80 (BP Location: Left arm, Patient Position: Sitting, Cuff Size: Standard)   Pulse 91   Temp 98 7 °F (37 1 °C) (Temporal)   Resp 16   Ht 5' 1" (1 549 m)   Wt 73 9 kg (163 lb)   SpO2 99%   Breastfeeding No   BMI 30 80 kg/m²     Physical Exam  Constitutional:       General: She is not in acute distress  Appearance: She is not ill-appearing  HENT:      Mouth/Throat:      Mouth: Mucous membranes are moist       Pharynx: No oropharyngeal exudate or posterior oropharyngeal erythema  Cardiovascular:      Rate and Rhythm: Normal rate and regular rhythm  Heart sounds: Normal heart sounds  No murmur heard  Pulmonary:      Effort: Pulmonary effort is normal  No respiratory distress  Breath sounds: Normal breath sounds  No wheezing  Musculoskeletal:      Cervical back: Neck supple  No tenderness  Lymphadenopathy:      Cervical: No cervical adenopathy  Neurological:      Mental Status: She is alert           Iram Jenkins MD

## 2022-12-02 NOTE — LETTER
December 2, 2022     Patient: Andrea Huizar  YOB: 1965  Date of Visit: 12/2/2022      To Whom it May Concern:    Andrea Huizar is under my professional care  Robin Carlson was seen in my office on 12/2/2022  Robin Carlson may return to work on 12/6/2022  If you have any questions or concerns, please don't hesitate to call           Sincerely,       Sebastián Jansen MD

## 2022-12-05 ENCOUNTER — TELEPHONE (OUTPATIENT)
Dept: FAMILY MEDICINE CLINIC | Facility: CLINIC | Age: 57
End: 2022-12-05

## 2022-12-05 NOTE — TELEPHONE ENCOUNTER
I already extended her time until tomorrow to be a week since initial assessment  If there is more going on, I would rather she is reassessed  Please discuss with patient possibly scheduling a same day visit to be reevaluated  Thank you 
Patient called asking to see if her work note can be extened because she still having symptoms  Feeling exhausted, no energy  Please advise, she would like a call back 
Pt scheduled
Quality 226: Preventive Care And Screening: Tobacco Use: Screening And Cessation Intervention: Tobacco Screening not Performed for Medical Reasons
Detail Level: Detailed
Quality 130: Documentation Of Current Medications In The Medical Record: Current Medications Documented
Quality 431: Preventive Care And Screening: Unhealthy Alcohol Use - Screening: Patient identified as an unhealthy alcohol user when screened for unhealthy alcohol use using a systematic screening method and received brief counseling

## 2022-12-06 ENCOUNTER — TELEPHONE (OUTPATIENT)
Dept: FAMILY MEDICINE CLINIC | Facility: CLINIC | Age: 57
End: 2022-12-06

## 2022-12-06 ENCOUNTER — OFFICE VISIT (OUTPATIENT)
Dept: FAMILY MEDICINE CLINIC | Facility: CLINIC | Age: 57
End: 2022-12-06

## 2022-12-06 VITALS
RESPIRATION RATE: 16 BRPM | DIASTOLIC BLOOD PRESSURE: 80 MMHG | WEIGHT: 161 LBS | OXYGEN SATURATION: 99 % | HEART RATE: 105 BPM | HEIGHT: 61 IN | SYSTOLIC BLOOD PRESSURE: 122 MMHG | TEMPERATURE: 97.8 F | BODY MASS INDEX: 30.4 KG/M2

## 2022-12-06 DIAGNOSIS — J06.9 UPPER RESPIRATORY TRACT INFECTION, UNSPECIFIED TYPE: ICD-10-CM

## 2022-12-06 DIAGNOSIS — B34.9 VIRAL SYNDROME: Primary | ICD-10-CM

## 2022-12-06 RX ORDER — FEXOFENADINE HCL 180 MG/1
180 TABLET ORAL DAILY
Qty: 90 TABLET | Refills: 0 | Status: SHIPPED | OUTPATIENT
Start: 2022-12-06

## 2022-12-06 RX ORDER — BENZONATATE 200 MG/1
200 CAPSULE ORAL 3 TIMES DAILY PRN
Qty: 20 CAPSULE | Refills: 0 | Status: SHIPPED | OUTPATIENT
Start: 2022-12-06

## 2022-12-06 RX ORDER — FLUTICASONE PROPIONATE 50 MCG
1 SPRAY, SUSPENSION (ML) NASAL DAILY
Qty: 16 G | Refills: 0 | Status: SHIPPED | OUTPATIENT
Start: 2022-12-06

## 2022-12-06 RX ORDER — GUAIFENESIN 100 MG/5ML
200 SYRUP ORAL 3 TIMES DAILY PRN
Qty: 120 ML | Refills: 0 | Status: SHIPPED | OUTPATIENT
Start: 2022-12-06

## 2022-12-06 RX ORDER — PHENYLEPHRINE HCL 10 MG/1
10 TABLET, FILM COATED ORAL EVERY 8 HOURS PRN
Qty: 12 TABLET | Refills: 0 | Status: SHIPPED | OUTPATIENT
Start: 2022-12-06

## 2022-12-06 NOTE — TELEPHONE ENCOUNTER
Patient called yesterday requesting rtw (to return 12/6/22) be extended stating she was still not feeling well, per provider patient needed to be seen again and  was scheduled on a "same day" visit this morning 12/6/22  After assessing patient and sending new RX the Attending provider wrote a rtw note for 12/8/22  Patient came back in to the office this afternoon requesting her rtw note 'be extended until Monday December 12 2022" patient stated that [facility] employer said "it makes no sense to come back to work for one day"  patient asked employer if note a was needed to cover Friday, just incase she still does not feel well she can stay home, her employer said a note is required to cover until Monday 12/12  Provider was unavailable to request extension, unaware of the situation our office [facility] wrote the extension  Pt stated that "they need the doctor to physically sign the note or they wont accept it" when the new rtw note was given to attending provider, the attending provider declined the extension since patient has been out of work since 11/28/22  Attending advised if pt is still not feeling well by Friday, another follow up will be needed to re-evaluate  Attending stated pt employer will require FMLA if time out is extended  Patient was advised of attending decision  Patient and friend were not happy with out come and stated "I will be back on Friday so you can charge my insurance again" pt asked if she needed to speak to anyone specific for Friday, pt advised to call the office is symptoms persist  Patient asked if I can advise attending that Trudi Cadena is on "Backorder for the cough medicine" attending advised  Cough med can be purchased otc

## 2022-12-06 NOTE — TELEPHONE ENCOUNTER
Pt contacted office stating she has spoken with her boss and her boss informed her that her excuse note makes no sence for pt to return to work on Friday if she's sick  Her boss is requesting an updated letter for pt to return to work on Monday 12/12/22 for pt to have enough time to recover  Please advice

## 2022-12-06 NOTE — PATIENT INSTRUCTIONS
Try the Neti-Pot for sinus congestion       Influenza   AMBULATORY CARE:   Influenza  (the flu) is an infection caused by the influenza virus  The flu is easily spread when an infected person coughs, sneezes, or has close contact with others  You may be able to spread the flu to others for 1 week or longer after signs or symptoms appear  Common signs and symptoms include the following:   Fever and chills    Headaches, body aches, and muscle or joint pain    Cough, runny nose, and sore throat    Loss of appetite, nausea, vomiting, or diarrhea    Tiredness    Trouble breathing    Call your local emergency number (911 in the [address] [facility]) if:   You have trouble breathing, and your lips look purple or blue  You have a seizure  Seek care immediately if:   You are dizzy, or you are urinating less or not at all  You have a headache with a stiff neck, and you feel tired or confused  You have new pain or pressure in your chest     Your symptoms, such as shortness of breath, vomiting, or diarrhea, get worse  Your symptoms, such as fever and coughing, seem to get better, but then get worse  Call your doctor if:   You have new muscle pain or weakness  You have questions or concerns about your condition or care  Treatment for influenza  may include any of the following:  Acetaminophen  decreases pain and fever  It is available without a doctor's order  Ask how much to take and how often to take it  Follow directions  Read the labels of all other medicines you are using to see if they also contain acetaminophen, or ask your doctor or pharmacist  Acetaminophen can cause liver damage if not taken correctly  Do not use more than 4 grams (4,000 milligrams) total of acetaminophen in one day  NSAIDs , such as ibuprofen, help decrease swelling, pain, and fever  This medicine is available with or without a doctor's order  NSAIDs can cause stomach bleeding or kidney problems in certain people   If you take blood thinner medicine, always ask your healthcare provider if NSAIDs are safe for you  Always read the medicine label and follow directions  Antivirals  help fight a viral infection  Manage your symptoms:   Rest  as much as you can to help you recover  Drink liquids as directed  to help prevent dehydration  Ask how much liquid to drink each day and which liquids are best for you  Prevent the spread of germs:       Wash your hands often  Wash your hands several times each day  Wash after you use the bathroom, change a child's diaper, and before you prepare or eat food  Use soap and water every time  Rub your soapy hands together, lacing your fingers  Wash the front and back of your hands, and in between your fingers  Use the fingers of one hand to scrub under the fingernails of the other hand  Wash for at least 20 seconds  Rinse with warm, running water for several seconds  Then dry your hands with a clean towel or paper towel  Use hand  that contains alcohol if soap and water are not available  Do not touch your eyes, nose, or mouth without washing your hands first          Cover a sneeze or cough  Use a tissue that covers your mouth and nose  Throw the tissue away in a trash can right away  Use the bend of your arm if a tissue is not available  Wash your hands well with soap and water or use a hand   Stay away from others while you are sick  Avoid crowds as much as possible  Ask about vaccines you may need  Talk to your healthcare provider about your vaccine history  He or she will tell you which vaccines you need, and when to get them  Get the influenza (flu) vaccine as soon as it is available  Flu viruses change, so it is important to get a flu vaccine every year  Get the pneumonia vaccine if recommended  This vaccine is usually recommended every 5 years  Your provider will tell you when to get this vaccine, if needed      Follow up with your doctor as directed:  Write down your questions so you remember to ask them during your visits  © Copyright [employer] 2022 Information is for End User's use only and may not be sold, redistributed or otherwise used for commercial purposes  All illustrations and images included in CareNotes® are the copyrighted property of A D A M , Inc  or Srinivas Cox  The above information is an  only  It is not intended as medical advice for individual conditions or treatments  Talk to your doctor, nurse or pharmacist before following any medical regimen to see if it is safe and effective for you  Acute Cough   AMBULATORY CARE:   An acute cough  can last up to 3 weeks  Common causes of an acute cough include a cold, allergies, or a lung infection  Seek care immediately if:   You have trouble breathing or feel short of breath  You cough up blood, or you see blood in your mucus  You faint or feel weak or dizzy  You have chest pain when you cough or take a deep breath  You have new wheezing  Contact your healthcare provider if:   You have a fever  Your cough lasts longer than 4 weeks  Your symptoms do not improve with treatment  You have questions or concerns about your condition or care  Treatment:  An acute cough usually goes away on its own  Ask your healthcare provider about medicines you can take to decrease your cough  You may need medicine to stop the cough, decrease swelling in your airways, or help open your airways  Medicine may also be given to help you cough up mucus  If you have an infection caused by bacteria, you may need antibiotics  Manage your symptoms:   Do not smoke and stay away from others who smoke  Nicotine and other chemicals in cigarettes and cigars can cause lung damage and make your cough worse  Ask your healthcare provider for information if you currently smoke and need help to quit  E-cigarettes or smokeless tobacco still contain nicotine   Talk to your healthcare provider before you use these products  Drink extra liquids as directed  Liquids will help thin and loosen mucus so you can cough it up  Liquids will also help prevent dehydration  Examples of good liquids to drink include water, fruit juice, and broth  Do not drink liquids that contain caffeine  Caffeine can increase your risk for dehydration  Ask your healthcare provider how much liquid to drink each day  Rest as directed  Do not do activities that make your cough worse, such as exercise  Use a humidifier or vaporizer  Use a cool mist humidifier or a vaporizer to increase air moisture in your home  This may make it easier for you to breathe and help decrease your cough  Eat 2 to 5 mL of honey 2 times each day  Honey can help thin mucus and decrease your cough  Use cough drops or lozenges  These can help decrease throat irritation and your cough  Follow up with your healthcare provider as directed:  Write down your questions so you remember to ask them during your visits  © Copyright [employer] 2022 Information is for End User's use only and may not be sold, redistributed or otherwise used for commercial purposes  All illustrations and images included in CareNotes® are the copyrighted property of [employer]  or [address] [place], [place] [address]macario jessika   The above information is an  only  It is not intended as medical advice for individual conditions or treatments  Talk to your doctor, nurse or pharmacist before following any medical regimen to see if it is safe and effective for you

## 2022-12-06 NOTE — TELEPHONE ENCOUNTER
Provider declined, letter removed    Discussed between provider and practice coordinator whom relayed to patient

## 2022-12-06 NOTE — LETTER
December 6, 2022     Patient: Marilee Sommer [place]   YOB: 1965   Date of Visit: 12/6/2022       To Whom it May Concern:    Andrea Huizar was seen in my clinic on 12/6/2022  She may return to work on 12/8/2022  If you have any questions or concerns, please don't hesitate to call           Sincerely,          [facility]        CC: No Recipients

## 2022-12-06 NOTE — PROGRESS NOTES
Assessment/Plan:    Casi Walton was seen today for worsening flu symtoms  Diagnoses and all orders for this visit:    Viral syndrome  -     guaiFENesin (ROBITUSSIN) 100 MG/5ML oral liquid; Take 10 mL (200 mg total) by mouth 3 (three) times a day as needed for cough  -     benzonatate (TESSALON) 200 MG capsule; Take 1 capsule (200 mg total) by mouth 3 (three) times a day as needed for cough  -     fexofenadine (ALLEGRA) 180 MG tablet; Take 1 tablet (180 mg total) by mouth daily  -     fluticasone (FLONASE) 50 mcg/act nasal spray; 1 spray into each nostril daily  -     phenylephrine (SUDAFED PE) 10 MG TABS; Take 1 tablet (10 mg total) by mouth every 8 (eight) hours as needed for congestion    Upper respiratory tract infection, unspecified type  -     guaiFENesin (ROBITUSSIN) 100 MG/5ML oral liquid; Take 10 mL (200 mg total) by mouth 3 (three) times a day as needed for cough  -     benzonatate (TESSALON) 200 MG capsule; Take 1 capsule (200 mg total) by mouth 3 (three) times a day as needed for cough  -     fexofenadine (ALLEGRA) 180 MG tablet; Take 1 tablet (180 mg total) by mouth daily  -     fluticasone (FLONASE) 50 mcg/act nasal spray; 1 spray into each nostril daily  -     phenylephrine (SUDAFED PE) 10 MG TABS; Take 1 tablet (10 mg total) by mouth every 8 (eight) hours as needed for congestion      Patient with viral sx, discussed supportive care         Return if symptoms worsen or fail to improve  Patient Instructions     Try the Neti-Pot for sinus congestion       Influenza   AMBULATORY CARE:   Influenza  (the flu) is an infection caused by the influenza virus  The flu is easily spread when an infected person coughs, sneezes, or has close contact with others  You may be able to spread the flu to others for 1 week or longer after signs or symptoms appear     Common signs and symptoms include the following:   · Fever and chills    · Headaches, body aches, and muscle or joint pain    · Cough, runny nose, and sore throat    · Loss of appetite, nausea, vomiting, or diarrhea    · Tiredness    · Trouble breathing    Call your local emergency number (911 in the [address]) if:   · You have trouble breathing, and your lips look purple or blue  · You have a seizure  Seek care immediately if:   · You are dizzy, or you are urinating less or not at all  · You have a headache with a stiff neck, and you feel tired or confused  · You have new pain or pressure in your chest     · Your symptoms, such as shortness of breath, vomiting, or diarrhea, get worse  · Your symptoms, such as fever and coughing, seem to get better, but then get worse  Call your doctor if:   · You have new muscle pain or weakness  · You have questions or concerns about your condition or care  Treatment for influenza  may include any of the following:  · Acetaminophen  decreases pain and fever  It is available without a doctor's order  Ask how much to take and how often to take it  Follow directions  Read the labels of all other medicines you are using to see if they also contain acetaminophen, or ask your doctor or pharmacist  Acetaminophen can cause liver damage if not taken correctly  Do not use more than 4 grams (4,000 milligrams) total of acetaminophen in one day  · NSAIDs , such as ibuprofen, help decrease swelling, pain, and fever  This medicine is available with or without a doctor's order  NSAIDs can cause stomach bleeding or kidney problems in certain people  If you take blood thinner medicine, always ask your healthcare provider if NSAIDs are safe for you  Always read the medicine label and follow directions  · Antivirals  help fight a viral infection  Manage your symptoms:   · Rest  as much as you can to help you recover  · Drink liquids as directed  to help prevent dehydration  Ask how much liquid to drink each day and which liquids are best for you  Prevent the spread of germs:       1  Wash your hands often    Wash your hands several times each day  Wash after you use the bathroom, change a child's diaper, and before you prepare or eat food  Use soap and water every time  Rub your soapy hands together, lacing your fingers  Wash the front and back of your hands, and in between your fingers  Use the fingers of one hand to scrub under the fingernails of the other hand  Wash for at least 20 seconds  Rinse with warm, running water for several seconds  Then dry your hands with a clean towel or paper towel  Use hand  that contains alcohol if soap and water are not available  Do not touch your eyes, nose, or mouth without washing your hands first          2  Cover a sneeze or cough  Use a tissue that covers your mouth and nose  Throw the tissue away in a trash can right away  Use the bend of your arm if a tissue is not available  Wash your hands well with soap and water or use a hand   3  Stay away from others while you are sick  Avoid crowds as much as possible  4  Ask about vaccines you may need  Talk to your healthcare provider about your vaccine history  He or she will tell you which vaccines you need, and when to get them  ? Get the influenza (flu) vaccine as soon as it is available  Flu viruses change, so it is important to get a flu vaccine every year  ? Get the pneumonia vaccine if recommended  This vaccine is usually recommended every 5 years  Your provider will tell you when to get this vaccine, if needed  Follow up with your doctor as directed:  Write down your questions so you remember to ask them during your visits  © Copyright [employer] 2022 Information is for End User's use only and may not be sold, redistributed or otherwise used for commercial purposes  All illustrations and images included in CareNotes® are the copyrighted property of A D A [employer]  or Srinivas Juárez   The above information is an  only   It is not intended as medical advice for individual conditions or treatments  Talk to your doctor, nurse or pharmacist before following any medical regimen to see if it is safe and effective for you  Acute Cough   AMBULATORY CARE:   An acute cough  can last up to 3 weeks  Common causes of an acute cough include a cold, allergies, or a lung infection  Seek care immediately if:   · You have trouble breathing or feel short of breath  · You cough up blood, or you see blood in your mucus  · You faint or feel weak or dizzy  · You have chest pain when you cough or take a deep breath  · You have new wheezing  Contact your healthcare provider if:   · You have a fever  · Your cough lasts longer than 4 weeks  · Your symptoms do not improve with treatment  · You have questions or concerns about your condition or care  Treatment:  An acute cough usually goes away on its own  Ask your healthcare provider about medicines you can take to decrease your cough  You may need medicine to stop the cough, decrease swelling in your airways, or help open your airways  Medicine may also be given to help you cough up mucus  If you have an infection caused by bacteria, you may need antibiotics  Manage your symptoms:   · Do not smoke and stay away from others who smoke  Nicotine and other chemicals in cigarettes and cigars can cause lung damage and make your cough worse  Ask your healthcare provider for information if you currently smoke and need help to quit  E-cigarettes or smokeless tobacco still contain nicotine  Talk to your healthcare provider before you use these products  · Drink extra liquids as directed  Liquids will help thin and loosen mucus so you can cough it up  Liquids will also help prevent dehydration  Examples of good liquids to drink include water, fruit juice, and broth  Do not drink liquids that contain caffeine  Caffeine can increase your risk for dehydration  Ask your healthcare provider how much liquid to drink each day      · Rest as directed  Do not do activities that make your cough worse, such as exercise  · Use a humidifier or vaporizer  Use a cool mist humidifier or a vaporizer to increase air moisture in your home  This may make it easier for you to breathe and help decrease your cough  · Eat 2 to 5 mL of honey 2 times each day  Honey can help thin mucus and decrease your cough  · Use cough drops or lozenges  These can help decrease throat irritation and your cough  Follow up with your healthcare provider as directed:  Write down your questions so you remember to ask them during your visits  © Copyright [employer] 2022 Information is for End User's use only and may not be sold, redistributed or otherwise used for commercial purposes  All illustrations and images included in CareNotes® are the copyrighted property of A D A M , Inc  or [facility] [employer]   The above information is an  only  It is not intended as medical advice for individual conditions or treatments  Talk to your doctor, nurse or pharmacist before following any medical regimen to see if it is safe and effective for you  Subjective:     Marta Santos is a 65 y o  female who  has a past medical history of Asthma, GERD (gastroesophageal reflux disease), Prediabetes, and Vitamin D deficiency  who presented to the office today for same day visit  She was found to be + influenza A on 11/28/2022  She reports today that she is still not feeling any better and continues with night sweats, abdominal pain, nausea, fatigue, body aches, cough, and congestion  She has been taking Robitussin and Claritin  Feels like she is not ready to return to work         The following portions of the patient's history were reviewed and updated as appropriate: allergies, current medications, past family history, past medical history, past social history, past surgical history and problem list     Current Outpatient Medications on File Prior to Visit   Medication Sig Dispense Refill   • acetaminophen (TYLENOL) 500 mg tablet Take 1 tablet (500 mg total) by mouth every 6 (six) hours as needed for mild pain 30 tablet 0   • albuterol (Ventolin HFA) 90 mcg/act inhaler Inhale 2 puffs every 6 (six) hours as needed for wheezing 18 g 5   • lidocaine (Lidoderm) 5 % Apply 1 patch topically daily Remove & Discard patch within 12 hours or as directed by MD 15 patch 1   • omeprazole (PriLOSEC) 20 mg delayed release capsule Take one capsule (20 mg) daily x 2 weeks then take one capsule (20 mg) every other day for 2 weeks, then stop 30 capsule 2   • [DISCONTINUED] benzonatate (TESSALON PERLES) 100 mg capsule Take 1 capsule (100 mg total) by mouth 3 (three) times a day as needed for cough 20 capsule 0   • [DISCONTINUED] dextromethorphan-guaiFENesin (ROBITUSSIN DM)  mg/5 mL syrup Take 5 mL by mouth 3 (three) times a day as needed for cough or congestion 354 mL 0   • [DISCONTINUED] guaiFENesin (ROBITUSSIN) 100 MG/5ML oral liquid Take 10 mL (200 mg total) by mouth 3 (three) times a day as needed for cough 120 mL 0   • [DISCONTINUED] loratadine (CLARITIN) 10 mg tablet Take 1 tablet (10 mg total) by mouth daily 30 tablet 0     No current facility-administered medications on file prior to visit  Review of Systems   Constitutional: Positive for activity change, appetite change, chills and fatigue  Negative for fever  HENT: Positive for congestion, rhinorrhea and sinus pain  Negative for ear pain and sore throat  Eyes: Negative for pain and visual disturbance  Respiratory: Positive for cough  Negative for shortness of breath  Cardiovascular: Negative for chest pain and palpitations  Gastrointestinal: Negative for abdominal pain and vomiting  Genitourinary: Negative for dysuria and hematuria  Musculoskeletal: Negative for arthralgias and back pain  Skin: Negative for color change and rash  Neurological: Negative for seizures and syncope  All other systems reviewed and are negative  Objective:    /80 (BP Location: Right arm, Patient Position: Sitting, Cuff Size: Standard)   Pulse 105   Temp 97 8 °F (36 6 °C) (Temporal)   Resp 16   Ht 5' 1" (1 549 m)   Wt 73 kg (161 lb)   SpO2 99%   Breastfeeding No   BMI 30 42 kg/m²     Physical Exam  Vitals and nursing note reviewed  Constitutional:       General: She is not in acute distress  Appearance: She is not toxic-appearing  HENT:      Head: Normocephalic and atraumatic  Right Ear: Tympanic membrane and external ear normal       Left Ear: Tympanic membrane and external ear normal       Nose: Congestion present  Right Sinus: Maxillary sinus tenderness present  Left Sinus: Maxillary sinus tenderness present  Mouth/Throat:      Pharynx: No posterior oropharyngeal erythema  Eyes:      General:         Right eye: No discharge  Left eye: No discharge  Conjunctiva/sclera: Conjunctivae normal    Cardiovascular:      Rate and Rhythm: Normal rate and regular rhythm  Pulmonary:      Effort: Pulmonary effort is normal  No respiratory distress  Breath sounds: No wheezing or rales  Lymphadenopathy:      Cervical: No cervical adenopathy  Skin:     General: Skin is warm and dry  Neurological:      Mental Status: She is alert     Psychiatric:         Behavior: Behavior normal          DEANDRE Lima  12/06/22  12:54 PM

## 2022-12-09 ENCOUNTER — OFFICE VISIT (OUTPATIENT)
Dept: FAMILY MEDICINE CLINIC | Facility: CLINIC | Age: 57
End: 2022-12-09

## 2022-12-09 VITALS
OXYGEN SATURATION: 99 % | BODY MASS INDEX: 30.85 KG/M2 | SYSTOLIC BLOOD PRESSURE: 124 MMHG | HEIGHT: 61 IN | WEIGHT: 163.4 LBS | TEMPERATURE: 98.9 F | DIASTOLIC BLOOD PRESSURE: 80 MMHG | RESPIRATION RATE: 18 BRPM | HEART RATE: 91 BPM

## 2022-12-09 DIAGNOSIS — J11.1 FLU: Primary | ICD-10-CM

## 2022-12-09 RX ORDER — ONDANSETRON 4 MG/1
4 TABLET, FILM COATED ORAL EVERY 8 HOURS PRN
Qty: 20 TABLET | Refills: 0 | Status: SHIPPED | OUTPATIENT
Start: 2022-12-09

## 2022-12-09 RX ORDER — OXYMETAZOLINE HYDROCHLORIDE 0.05 G/100ML
2 SPRAY NASAL 2 TIMES DAILY
Qty: 30 ML | Refills: 0 | Status: SHIPPED | OUTPATIENT
Start: 2022-12-09

## 2022-12-09 NOTE — ASSESSMENT & PLAN NOTE
- Reviewed supportive management such as salt water gargles, increasing hydration, rest   - Recommend increasing flonase from daily to bid  - Recommend afrin but advised to not use > 3 days given possible rebound congestion   - Zofran prn for nausea

## 2022-12-09 NOTE — LETTER
December 9, 2022     Patient: Adnrea Huizar  YOB: 1965  Date of Visit: 12/9/2022      To Whom it May Concern:    Andrea Huizar is under my professional care  Tiki Gaviria was seen in my office on 12/9/2022  Tiki Gaviria may return to work on 12/12/22  If you have any questions or concerns, please don't hesitate to call           Sincerely,          [facility]        CC: No Recipients

## 2022-12-09 NOTE — PROGRESS NOTES
Name: Carolyn Reyna      : 1965      MRN: [id number]  Encounter Provider: Isabel Leger [place]  Encounter Date: 2022   Encounter department: [address] [place] [address]  Flu  Assessment & Plan:  - Reviewed supportive management such as salt water gargles, increasing hydration, rest   - Recommend increasing flonase from daily to bid  - Recommend afrin but advised to not use > 3 days given possible rebound congestion   - Zofran prn for nausea  Orders:  -     ondansetron (ZOFRAN) 4 mg tablet; Take 1 tablet (4 mg total) by mouth every 8 (eight) hours as needed for nausea or vomiting  -     oxymetazoline (AFRIN) 0 05 % nasal spray; 2 sprays by Each Nare route 2 (two) times a day         Justice [place] Gillian is a 65 y o  female  has a past medical history of Asthma, GERD (gastroesophageal reflux disease), Prediabetes, and Vitamin D deficiency  has a past surgical history that includes No past surgeries  She tested positive for the flu about 1 5 weeks ago  She was taking cough medicine, sudafed, flonase, and allegra which did help  She is overall improving without fevers, chills, and night sweats like previously  However, she is still experiencing congestion, fatigue, and nausea  Review of Systems   Constitutional: Positive for fatigue  Negative for chills and fever  HENT: Positive for congestion and postnasal drip  Negative for ear pain and sore throat  Eyes: Negative for pain and visual disturbance  Respiratory: Positive for cough  Negative for shortness of breath  Cardiovascular: Negative for chest pain and palpitations  Gastrointestinal: Positive for nausea  Negative for abdominal pain and vomiting  Genitourinary: Negative for dysuria and hematuria  Musculoskeletal: Negative for arthralgias and back pain  Skin: Negative for color change and rash     Neurological: Negative for seizures and syncope  All other systems reviewed and are negative  Current Outpatient Medications on File Prior to Visit   Medication Sig   • acetaminophen (TYLENOL) 500 mg tablet Take 1 tablet (500 mg total) by mouth every 6 (six) hours as needed for mild pain   • albuterol (Ventolin HFA) 90 mcg/act inhaler Inhale 2 puffs every 6 (six) hours as needed for wheezing   • benzonatate (TESSALON) 200 MG capsule Take 1 capsule (200 mg total) by mouth 3 (three) times a day as needed for cough   • fexofenadine (ALLEGRA) 180 MG tablet Take 1 tablet (180 mg total) by mouth daily   • fluticasone (FLONASE) 50 mcg/act nasal spray 1 spray into each nostril daily   • guaiFENesin (ROBITUSSIN) 100 MG/5ML oral liquid Take 10 mL (200 mg total) by mouth 3 (three) times a day as needed for cough   • lidocaine (Lidoderm) 5 % Apply 1 patch topically daily Remove & Discard patch within 12 hours or as directed by MD   • omeprazole (PriLOSEC) 20 mg delayed release capsule Take one capsule (20 mg) daily x 2 weeks then take one capsule (20 mg) every other day for 2 weeks, then stop   • phenylephrine (SUDAFED PE) 10 MG TABS Take 1 tablet (10 mg total) by mouth every 8 (eight) hours as needed for congestion       Objective     /80 (BP Location: Left arm, Patient Position: Sitting, Cuff Size: Standard)   Pulse 91   Temp 98 9 °F (37 2 °C) (Temporal)   Resp 18   Ht 5' 1" (1 549 m)   Wt 74 1 kg (163 lb 6 4 oz)   SpO2 99%   BMI 30 87 kg/m²     Physical Exam  Vitals and nursing note reviewed  HENT:      Head: Normocephalic and atraumatic  Right Ear: Hearing, tympanic membrane, ear canal and external ear normal       Left Ear: Hearing, tympanic membrane, ear canal and external ear normal       Nose: Nasal tenderness and congestion present  Mouth/Throat:      Lips: Pink  Pharynx: Oropharynx is clear  Uvula midline  Eyes:      Extraocular Movements: Extraocular movements intact        Conjunctiva/sclera: Conjunctivae normal  Pupils: Pupils are equal, round, and reactive to light  Cardiovascular:      Rate and Rhythm: Normal rate and regular rhythm  Pulses: Normal pulses  Heart sounds: Normal heart sounds  Pulmonary:      Effort: Pulmonary effort is normal       Breath sounds: Normal breath sounds  Abdominal:      General: Bowel sounds are normal       Palpations: Abdomen is soft  Tenderness: There is no abdominal tenderness  Musculoskeletal:         General: Normal range of motion  Cervical back: Full passive range of motion without pain and normal range of motion  Skin:     General: Skin is warm and dry  Neurological:      General: No focal deficit present  Mental Status: She is alert and oriented to person, place, and time  Mental status is at baseline  Psychiatric:         Mood and Affect: Mood normal          Behavior: Behavior normal          Thought Content:  Thought content normal        [address] [place] [address]

## 2023-01-26 DIAGNOSIS — K21.9 GASTROESOPHAGEAL REFLUX DISEASE WITHOUT ESOPHAGITIS: ICD-10-CM

## 2023-01-26 DIAGNOSIS — J45.20 MILD INTERMITTENT ASTHMA WITHOUT COMPLICATION: ICD-10-CM

## 2023-01-26 RX ORDER — ALBUTEROL SULFATE 90 UG/1
2 AEROSOL, METERED RESPIRATORY (INHALATION) EVERY 6 HOURS PRN
Qty: 8.5 G | Refills: 0 | Status: SHIPPED | OUTPATIENT
Start: 2023-01-26 | End: 2023-02-06 | Stop reason: SDUPTHER

## 2023-01-26 NOTE — TELEPHONE ENCOUNTER
Good afternoon  This message is for Dunia  I got a call back from you  My name is [facility]  Regarding the matter, I spoke to you this morning with my beautiful and healer  Thank you so much that the pharmacy did put it through  However, I just went to refill for my omeprazole and they said this would be the last refill that I would need a new script  And also, this was put in as a tapering dose  Originally, I was taking this medication once a day  I had seen one of the providers there believe it, believe it was Betty, who had done a tapering dose  And then when I went back and see my provider, Amadou Antunez, he decided for me to continue to take the medication  But I think he just kept it as a refill from the tapering dose  And that's what they're showing in the system  So originally it was but once a day  But again, that prescription, they don't see in the system  Now they only see that tapering one  And this is the last refill  So if you're able to, please assist me and getting that script corrected with the doctor and so that our new one could be sent over because she did say this is the last I believe  24 taps  I'm 24 capsules  I'm sorry  So if you can give me a call back, my number is [phone]  Again, this is [facility]  Thank you and have a great day

## 2023-01-26 NOTE — TELEPHONE ENCOUNTER
Pt stating pharmacy told her she needs prior auth for albuterol inhaler  Insurance only covers Albuterol Sulfate (HFAA)   But I could not find that specifically and that's pretty much what was sent so I can only assume the pt needs a renewed Rx since it hasn't been sent since 4/2022 and it was never dispensed  Last dispense was 12/2021    I put a note to pharmacy and changed the qty in hopes that fixes the problem  LM letting her know

## 2023-01-30 RX ORDER — OMEPRAZOLE 20 MG/1
CAPSULE, DELAYED RELEASE ORAL
Qty: 30 CAPSULE | Refills: 2 | Status: SHIPPED | OUTPATIENT
Start: 2023-01-30 | End: 2023-02-06

## 2023-02-06 ENCOUNTER — OFFICE VISIT (OUTPATIENT)
Dept: FAMILY MEDICINE CLINIC | Facility: CLINIC | Age: 58
End: 2023-02-06

## 2023-02-06 VITALS
HEIGHT: 61 IN | SYSTOLIC BLOOD PRESSURE: 130 MMHG | BODY MASS INDEX: 31.15 KG/M2 | WEIGHT: 165 LBS | TEMPERATURE: 97.1 F | OXYGEN SATURATION: 99 % | HEART RATE: 16 BPM | DIASTOLIC BLOOD PRESSURE: 76 MMHG | RESPIRATION RATE: 16 BRPM

## 2023-02-06 DIAGNOSIS — M25.531 RIGHT WRIST PAIN: ICD-10-CM

## 2023-02-06 DIAGNOSIS — J45.20 MILD INTERMITTENT ASTHMA WITHOUT COMPLICATION: ICD-10-CM

## 2023-02-06 DIAGNOSIS — K21.9 GASTROESOPHAGEAL REFLUX DISEASE WITHOUT ESOPHAGITIS: ICD-10-CM

## 2023-02-06 DIAGNOSIS — R09.81 MILD NASAL CONGESTION: Primary | ICD-10-CM

## 2023-02-06 PROBLEM — J11.1 FLU: Status: RESOLVED | Noted: 2022-12-09 | Resolved: 2023-02-06

## 2023-02-06 RX ORDER — FAMOTIDINE 20 MG/1
20 TABLET, FILM COATED ORAL DAILY
Qty: 30 TABLET | Refills: 1 | Status: SHIPPED | OUTPATIENT
Start: 2023-02-06

## 2023-02-06 RX ORDER — ALBUTEROL SULFATE 90 UG/1
2 AEROSOL, METERED RESPIRATORY (INHALATION) EVERY 6 HOURS PRN
Qty: 8.5 G | Refills: 3 | Status: SHIPPED | OUTPATIENT
Start: 2023-02-06

## 2023-02-06 NOTE — ASSESSMENT & PLAN NOTE
· Discussed risks/benefits COVID vaccine; still contemplating at this time  · Cervical Cancer screening UTD-negative PAP; repeat 2024  · Colonoscopy UTD 2020-no abnormalities, repeat 2030  · Mammogram 9/2022: No mammographic evidence of malignancy-repeat 9/2023  · PCV 20 UTD

## 2023-02-06 NOTE — LETTER
February 6, 2023     Patient: Lauren [place]  YOB: 1965  Date of Visit: 2/6/2023      To Whom it May Concern:    Geraldine Chen is under my professional care  Bell Her was seen in my office on 2/6/2023  Bell Her may return to work on 2/8/23  If you have any questions or concerns, please don't hesitate to call           Sincerely,          Lew Cherry MD        CC: No Recipients

## 2023-02-06 NOTE — ASSESSMENT & PLAN NOTE
Lesion located in the Ostium Diag 1. Balloon removed. · Secondary to overuse from using her computer at work  · Recommended conservative therapy  · Recommend buying an ergonomic mouse  · NSAID's prn  · Has brace at home; recommend wearing that while working  · Consider DeQuervain's Tenosynovitis   · Advised to wear wrist brace and will follow up

## 2023-02-06 NOTE — ASSESSMENT & PLAN NOTE
· With associated headaches  · No obvious deformities on physical exam  · No testing needed at this time  · Continue conservative measures with Ibuprofen/Tylenol   Can also utilize Flonase   · F/u prn

## 2023-02-06 NOTE — PROGRESS NOTES
[facility]   [address]  [place], [address]  Phone#  [phone]  Fax#  [phone]    ASSESSMENT and PLAN  Mild nasal congestion  · With associated headaches  · No obvious deformities on physical exam  · No testing needed at this time  · Continue conservative measures with Ibuprofen/Tylenol  Can also utilize Flonase   · F/u prn    Healthcare maintenance  · Discussed risks/benefits COVID vaccine; still contemplating at this time  · Cervical Cancer screening UTD-negative PAP; repeat 2024  · Colonoscopy UTD 2020-no abnormalities, repeat 2030  · Mammogram 9/2022: No mammographic evidence of malignancy-repeat 9/2023  · PCV 20 UTD    Gastroesophageal reflux disease without esophagitis  · Continue Pepcid 20 mg qd    Right wrist pain  · Secondary to overuse from using her computer at work  · Recommended conservative therapy  · Recommend buying an ergonomic mouse  · NSAID's prn  · Has brace at home; recommend wearing that while working  · Consider DeQuervain's Tenosynovitis   · Advised to wear wrist brace and will follow up    Diagnoses and all orders for this visit:    Mild nasal congestion    Gastroesophageal reflux disease without esophagitis  -     famotidine (PEPCID) 20 mg tablet; Take 1 tablet (20 mg total) by mouth daily    Mild intermittent asthma without complication  -     albuterol (Ventolin HFA) 90 mcg/act inhaler; Inhale 2 puffs every 6 (six) hours as needed for wheezing or shortness of breath    Right wrist pain      HISTORY OF PRESENT ILLNESS  Mariajose Che is a 65 y o  female with a significant PMHx of Prediabetes, GERD, Asthma, who presents to the clinic for  management of their chronic medical conditions  Patient's medical conditions are stable unless noted otherwise above  Patient has not had any recent hospitalizations, or medical emergencies since last visit    Patient has no further complaints other than what is mentioned in the ROS     REVIEW OF SYSTEMS  Review of Systems   Constitutional: Negative for chills, fatigue and fever  HENT: Positive for congestion  Negative for sore throat  Respiratory: Negative for cough, chest tightness and shortness of breath  Cardiovascular: Negative for chest pain and leg swelling  Gastrointestinal: Negative for constipation, diarrhea, nausea and vomiting  Endocrine: Negative for polydipsia, polyphagia and polyuria  Genitourinary: Negative for dysuria  Musculoskeletal: Negative for arthralgias  Skin: Negative for rash  Neurological: Negative for dizziness, light-headedness and headaches  Psychiatric/Behavioral: Negative for agitation and behavioral problems  PAST MEDICAL HISTORY   Past Medical History:   Diagnosis Date   • Asthma    • GERD (gastroesophageal reflux disease)    • Prediabetes    • Vitamin D deficiency 2017     Past Surgical History:   Procedure Laterality Date   • NO PAST SURGERIES       Social History     Socioeconomic History   • Marital status: /Civil Union     Spouse name: Not on file   • Number of children: Not on file   • Years of education: Not on file   • Highest education level: Not on file   Occupational History   • Not on file   Tobacco Use   • Smoking status: Never   • Smokeless tobacco: Never   Vaping Use   • Vaping Use: Never used   Substance and Sexual Activity   • Alcohol use:  Yes   • Drug use: Never   • Sexual activity: Yes     Partners: Male     Birth control/protection: Post-menopausal   Other Topics Concern   • Not on file   Social History Narrative    No preference on Jain beliefs      Social Determinants of Health     Financial Resource Strain: Low Risk    • Difficulty of Paying Living Expenses: Not hard at all   Food Insecurity: No Food Insecurity   • Worried About Running Out of Food in the Last Year: Never true   • Ran Out of Food in the Last Year: Never true   Transportation Needs: No Transportation Needs   • Lack of Transportation (Medical): No   • Lack of Transportation (Non-Medical):  No   Physical Activity: Not on file   Stress: Not on file   Social Connections: Not on file   Intimate Partner Violence: Not on file   Housing Stability: Not on file     Family History   Problem Relation Age of Onset   • No Known Problems Mother    • No Known Problems Father    • No Known Problems Daughter    • Dementia Maternal Grandmother         Without behavioral disturbance, unspecified dementia type    • Heart failure Maternal Grandmother    • Hypertension Maternal Grandmother    • Diabetes type II Maternal Grandmother    • No Known Problems Paternal Grandfather    • Breast cancer Maternal Aunt         58   • Breast cancer Maternal Aunt         60s   • No Known Problems Maternal Aunt    • Colon cancer Neg Hx    • Ovarian cancer Neg Hx        MEDICATIONS    Current Outpatient Medications:   •  albuterol (Ventolin HFA) 90 mcg/act inhaler, Inhale 2 puffs every 6 (six) hours as needed for wheezing or shortness of breath, Disp: 8 5 g, Rfl: 3  •  famotidine (PEPCID) 20 mg tablet, Take 1 tablet (20 mg total) by mouth daily, Disp: 30 tablet, Rfl: 1  •  acetaminophen (TYLENOL) 500 mg tablet, Take 1 tablet (500 mg total) by mouth every 6 (six) hours as needed for mild pain, Disp: 30 tablet, Rfl: 0  •  benzonatate (TESSALON) 200 MG capsule, Take 1 capsule (200 mg total) by mouth 3 (three) times a day as needed for cough, Disp: 20 capsule, Rfl: 0  •  fexofenadine (ALLEGRA) 180 MG tablet, Take 1 tablet (180 mg total) by mouth daily, Disp: 90 tablet, Rfl: 0  •  fluticasone (FLONASE) 50 mcg/act nasal spray, 1 spray into each nostril daily, Disp: 16 g, Rfl: 0  •  guaiFENesin (ROBITUSSIN) 100 MG/5ML oral liquid, Take 10 mL (200 mg total) by mouth 3 (three) times a day as needed for cough, Disp: 120 mL, Rfl: 0  •  lidocaine (Lidoderm) 5 %, Apply 1 patch topically daily Remove & Discard patch within 12 hours or as directed by MD, Disp: 15 patch, Rfl: 1  • ondansetron (ZOFRAN) 4 mg tablet, Take 1 tablet (4 mg total) by mouth every 8 (eight) hours as needed for nausea or vomiting, Disp: 20 tablet, Rfl: 0  •  oxymetazoline (AFRIN) 0 05 % nasal spray, 2 sprays by Each Nare route 2 (two) times a day, Disp: 30 mL, Rfl: 0  •  phenylephrine (SUDAFED PE) 10 MG TABS, Take 1 tablet (10 mg total) by mouth every 8 (eight) hours as needed for congestion, Disp: 12 tablet, Rfl: 0    PHYSICAL EXAM  Vitals:    02/06/23 1537   BP: 130/76   BP Location: Left arm   Patient Position: Sitting   Cuff Size: Standard   Pulse: (!) 16   Resp: 16   Temp: (!) 97 1 °F (36 2 °C)   TempSrc: Temporal   SpO2: 99%   Weight: 74 8 kg (165 lb)   Height: 5' 1" (1 549 m)     Wt Readings from Last 3 Encounters:   02/06/23 74 8 kg (165 lb)   12/09/22 74 1 kg (163 lb 6 4 oz)   12/06/22 73 kg (161 lb)    , Body mass index is 31 18 kg/m²  Physical Exam  Constitutional:       Appearance: She is well-developed  HENT:      Head: Normocephalic and atraumatic  Right Ear: Tympanic membrane, ear canal and external ear normal       Left Ear: Tympanic membrane, ear canal and external ear normal       Nose: Congestion present  No rhinorrhea  Mouth/Throat:      Mouth: Mucous membranes are moist       Pharynx: Oropharynx is clear  No oropharyngeal exudate or posterior oropharyngeal erythema  Eyes:      Pupils: Pupils are equal, round, and reactive to light  Cardiovascular:      Rate and Rhythm: Normal rate and regular rhythm  Heart sounds: Normal heart sounds  No murmur heard  No friction rub  No gallop  Pulmonary:      Effort: Pulmonary effort is normal  No respiratory distress  Breath sounds: Normal breath sounds  No stridor  No wheezing, rhonchi or rales  Abdominal:      General: Bowel sounds are normal       Palpations: Abdomen is soft  Musculoskeletal:         General: Normal range of motion  Right wrist: Normal  No swelling, tenderness or bony tenderness   Normal range of motion  Left wrist: Normal  No swelling or bony tenderness  Normal range of motion  Cervical back: Normal range of motion and neck supple  Skin:     General: Skin is warm  Findings: No erythema or rash  Neurological:      Mental Status: She is alert and oriented to person, place, and time  Psychiatric:         Behavior: Behavior normal          LABS/IMAGING  Hemoglobin A1C   Date Value Ref Range Status   09/22/2022 5 9 6 5 Final   03/22/2022 5 7 (H) Normal 3 8-5 6%; PreDiabetic 5 7-6 4%; Diabetic >=6 5%; Glycemic control for adults with diabetes <7 0% % Final     HDL, Direct   Date Value Ref Range Status   03/22/2022 53 >=50 mg/dL Final     Comment:     Specimen collection should occur prior to Metamizole administration due to the potential for falsley depressed results  Triglycerides   Date Value Ref Range Status   03/22/2022 108 See Comment mg/dL Final     Comment:     Triglyceride:     0-9Y            <75mg/dL     10Y-17Y         <90 mg/dL       >=18Y     Normal          <150 mg/dL     Borderline High 150-199 mg/dL     High            200-499 mg/dL        Very High       >499 mg/dL    Specimen collection should occur prior to N-Acetylcysteine or Metamizole administration due to the potential for falsely depressed results        WBC   Date Value Ref Range Status   03/22/2022 5 14 4 31 - 10 16 Thousand/uL Final   08/01/2017 6 04 4 31 - 10 16 Thousand/uL Final   10/14/2014 6 16 4 31 - 10 16 Thousand/uL Final     Hemoglobin   Date Value Ref Range Status   03/22/2022 12 1 11 5 - 15 4 g/dL Final   08/01/2017 11 7 11 5 - 15 4 g/dL Final   10/14/2014 12 4 11 5 - 15 4 g/dL Final     Platelets   Date Value Ref Range Status   03/22/2022 349 149 - 390 Thousands/uL Final   08/01/2017 338 149 - 390 Thousands/uL Final   10/14/2014 322 149 - 390 Thousand/uL Final     Potassium   Date Value Ref Range Status   03/22/2022 3 6 3 5 - 5 3 mmol/L Final   07/07/2021 3 9 3 5 - 5 3 mmol/L Final   06/08/2020 4 2 3 5 - 5 3 mmol/L Final   10/14/2014 3 9 3 6 - 5 0 mmol/L Final     Chloride   Date Value Ref Range Status   03/22/2022 110 (H) 100 - 108 mmol/L Final   07/07/2021 106 100 - 108 mmol/L Final   06/08/2020 109 (H) 100 - 108 mmol/L Final   10/14/2014 105 101 - 111 mmol/L Final     CO2   Date Value Ref Range Status   03/22/2022 26 21 - 32 mmol/L Final   07/07/2021 27 21 - 32 mmol/L Final   06/08/2020 26 21 - 32 mmol/L Final   10/14/2014 25 21 - 31 mmol/L Final     Anion Gap   Date Value Ref Range Status   10/14/2014 12 4 - 13 mmol/L Final     BUN   Date Value Ref Range Status   03/22/2022 10 5 - 25 mg/dL Final   07/07/2021 15 5 - 25 mg/dL Final   06/08/2020 13 5 - 25 mg/dL Final   10/14/2014 11 5 - 27 mg/dL Final     Creatinine   Date Value Ref Range Status   03/22/2022 0 85 0 60 - 1 30 mg/dL Final     Comment:     Standardized to IDMS reference method   07/07/2021 0 91 0 60 - 1 30 mg/dL Final     Comment:     Standardized to IDMS reference method   06/08/2020 0 83 0 60 - 1 30 mg/dL Final     Comment:     Standardized to IDMS reference method   10/14/2014 0 85 0 60 - 1 30 mg/dL Final     Comment:     Standardized to IDMS reference method     eGFR   Date Value Ref Range Status   03/22/2022 77 ml/min/1 73sq m Final   07/07/2021 82 ml/min/1 73sq m Final   06/08/2020 93 ml/min/1 73sq m Final     Glucose   Date Value Ref Range Status   10/14/2014 106 65 - 140 mg/dL Final     Comment:     If patient is fasting, the ADA then defines impaired fasting glucose as  >100 mg/dl and diabetes as  >or equal to 126 mg/dl  Calcium   Date Value Ref Range Status   03/22/2022 9 2 8 3 - 10 1 mg/dL Final   07/07/2021 9 4 8 3 - 10 1 mg/dL Final   06/08/2020 8 9 8 3 - 10 1 mg/dL Final   10/14/2014 9 2 8 3 - 10 1 mg/dL Final     AST   Date Value Ref Range Status   03/22/2022 18 5 - 45 U/L Final     Comment:     Specimen collection should occur prior to Sulfasalazine administration due to the potential for falsely depressed results  07/07/2021 24 5 - 45 U/L Final     Comment:     Specimen collection should occur prior to Sulfasalazine administration due to the potential for falsely depressed results  06/08/2020 18 5 - 45 U/L Final     Comment:       Specimen collection should occur prior to Sulfasalazine administration due to the potential for falsely depressed results  10/14/2014 22 10 - 42 U/L Final     ALT   Date Value Ref Range Status   03/22/2022 23 12 - 78 U/L Final     Comment:     Specimen collection should occur prior to Sulfasalazine and/or Sulfapyridine administration due to the potential for falsely depressed results  07/07/2021 28 12 - 78 U/L Final     Comment:     Specimen collection should occur prior to Sulfasalazine and/or Sulfapyridine administration due to the potential for falsely depressed results  06/08/2020 25 12 - 78 U/L Final     Comment:       Specimen collection should occur prior to Sulfasalazine and/or Sulfapyridine administration due to the potential for falsely depressed results  10/14/2014 24 6 - 78 U/L Final     Alkaline Phosphatase   Date Value Ref Range Status   03/22/2022 95 46 - 116 U/L Final   07/07/2021 120 (H) 46 - 116 U/L Final   06/08/2020 98 46 - 116 U/L Final   10/14/2014 122 (H) 42 - 121 U/L Final     Total Protein   Date Value Ref Range Status   10/14/2014 8 5 (H) 6 4 - 8 2 g/dL Final     Total Bilirubin   Date Value Ref Range Status   10/14/2014 0 3 0 2 - 1 0 mg/dL Final     No results found for: TSH    This note has been dictated using Swiftpage software  It may contain errors, including improperly dictated words  Please contact physician directly for any questions       Cecille Parents   PGY-3

## 2023-02-08 ENCOUNTER — HOSPITAL ENCOUNTER (EMERGENCY)
Facility: HOSPITAL | Age: 58
Discharge: HOME | End: 2023-02-08
Attending: EMERGENCY MEDICINE

## 2023-02-08 ENCOUNTER — APPOINTMENT (EMERGENCY)
Dept: NON INVASIVE DIAGNOSTICS | Facility: HOSPITAL | Age: 58
End: 2023-02-08

## 2023-02-08 VITALS
WEIGHT: 166.23 LBS | SYSTOLIC BLOOD PRESSURE: 165 MMHG | DIASTOLIC BLOOD PRESSURE: 88 MMHG | OXYGEN SATURATION: 99 % | TEMPERATURE: 98.5 F | RESPIRATION RATE: 20 BRPM | BODY MASS INDEX: 31.41 KG/M2 | HEART RATE: 86 BPM

## 2023-02-08 DIAGNOSIS — M79.604 RIGHT LEG PAIN: Primary | ICD-10-CM

## 2023-02-08 RX ORDER — IBUPROFEN 600 MG/1
600 TABLET ORAL ONCE
Status: COMPLETED | OUTPATIENT
Start: 2023-02-08 | End: 2023-02-08

## 2023-02-08 RX ORDER — METHOCARBAMOL 500 MG/1
500 TABLET, FILM COATED ORAL 2 TIMES DAILY
Qty: 20 TABLET | Refills: 0 | Status: SHIPPED | OUTPATIENT
Start: 2023-02-08

## 2023-02-08 RX ADMIN — IBUPROFEN 600 MG: 600 TABLET, FILM COATED ORAL at 12:22

## 2023-02-08 NOTE — Clinical Note
Graciela Ke was seen and treated in our emergency department on 2/8/2023  Diagnosis:     Darlene Mclaughlin  may return to work on return date  She may return on this date: 02/09/2023         If you have any questions or concerns, please don't hesitate to call        Jimena Diaz PA-C    ______________________________           _______________          _______________  Hospital Representative                              Date                                Time

## 2023-02-08 NOTE — ED PROVIDER NOTES
History  Chief Complaint   Patient presents with   • Leg Pain     Patient reports hurrying on Monday and then felt like a "pop" in the right calf and has had pain since with difficulty bearing weight on the leg since  Patient is a 65year old female with a history of asthma and GERD presenting with right calf pain for 3 days  She reports walking around doing chores on Monday afternoon when she got a sudden sharp pain in her right calf  Reports associated mild swelling  Does not recall any injury or twisting motion  No pain in knee or ankle  Denies numbness, tingling, weakness  She reports difficulty with weightbearing due to pain  Tried heat and ibuprofen with minimal relief  Denies history of blood clots or malignancy, smoking, recent travel or surgery  Prior to Admission Medications   Prescriptions Last Dose Informant Patient Reported?  Taking?   acetaminophen (TYLENOL) 500 mg tablet   No No   Sig: Take 1 tablet (500 mg total) by mouth every 6 (six) hours as needed for mild pain   albuterol (Ventolin HFA) 90 mcg/act inhaler   No No   Sig: Inhale 2 puffs every 6 (six) hours as needed for wheezing or shortness of breath   benzonatate (TESSALON) 200 MG capsule   No No   Sig: Take 1 capsule (200 mg total) by mouth 3 (three) times a day as needed for cough   famotidine (PEPCID) 20 mg tablet   No No   Sig: Take 1 tablet (20 mg total) by mouth daily   fexofenadine (ALLEGRA) 180 MG tablet   No No   Sig: Take 1 tablet (180 mg total) by mouth daily   fluticasone (FLONASE) 50 mcg/act nasal spray   No No   Si spray into each nostril daily   guaiFENesin (ROBITUSSIN) 100 MG/5ML oral liquid   No No   Sig: Take 10 mL (200 mg total) by mouth 3 (three) times a day as needed for cough   lidocaine (Lidoderm) 5 %   No No   Sig: Apply 1 patch topically daily Remove & Discard patch within 12 hours or as directed by MD   ondansetron (ZOFRAN) 4 mg tablet   No No   Sig: Take 1 tablet (4 mg total) by mouth every 8 (eight) hours as needed for nausea or vomiting   oxymetazoline (AFRIN) 0 05 % nasal spray   No No   Si sprays by Each Nare route 2 (two) times a day   phenylephrine (SUDAFED PE) 10 MG TABS   No No   Sig: Take 1 tablet (10 mg total) by mouth every 8 (eight) hours as needed for congestion      Facility-Administered Medications: None       Past Medical History:   Diagnosis Date   • Asthma    • GERD (gastroesophageal reflux disease)    • Prediabetes    • Vitamin D deficiency        Past Surgical History:   Procedure Laterality Date   • NO PAST SURGERIES         Family History   Problem Relation Age of Onset   • No Known Problems Mother    • No Known Problems Father    • No Known Problems Daughter    • Dementia Maternal Grandmother         Without behavioral disturbance, unspecified dementia type    • Heart failure Maternal Grandmother    • Hypertension Maternal Grandmother    • Diabetes type II Maternal Grandmother    • No Known Problems Paternal Grandfather    • Breast cancer Maternal Aunt         58   • Breast cancer Maternal Aunt         60s   • No Known Problems Maternal Aunt    • Colon cancer Neg Hx    • Ovarian cancer Neg Hx      I have reviewed and agree with the history as documented  E-Cigarette/Vaping   • E-Cigarette Use Never User      E-Cigarette/Vaping Substances     Social History     Tobacco Use   • Smoking status: Never   • Smokeless tobacco: Never   Vaping Use   • Vaping Use: Never used   Substance Use Topics   • Alcohol use: Yes   • Drug use: Never       Review of Systems   Constitutional: Negative for chills and fever  HENT: Negative for ear pain and sore throat  Eyes: Negative for pain and visual disturbance  Respiratory: Negative for cough and shortness of breath  Cardiovascular: Negative for chest pain and palpitations  Gastrointestinal: Negative for abdominal pain and vomiting  Genitourinary: Negative for dysuria and hematuria     Musculoskeletal: Negative for arthralgias, back pain and neck pain  Leg pain  Skin: Negative for color change and rash  Neurological: Negative for seizures and syncope  All other systems reviewed and are negative  Physical Exam  Physical Exam  Vitals and nursing note reviewed  Constitutional:       General: She is not in acute distress  Appearance: Normal appearance  She is not ill-appearing  HENT:      Head: Normocephalic and atraumatic  Right Ear: External ear normal       Left Ear: External ear normal       Nose: Nose normal       Mouth/Throat:      Mouth: Mucous membranes are moist    Eyes:      General: No scleral icterus  Right eye: No discharge  Left eye: No discharge  Cardiovascular:      Rate and Rhythm: Normal rate and regular rhythm  Pulses:           Dorsalis pedis pulses are 2+ on the right side and 2+ on the left side  Posterior tibial pulses are 2+ on the right side and 2+ on the left side  Heart sounds: Normal heart sounds  Pulmonary:      Effort: Pulmonary effort is normal  No accessory muscle usage or respiratory distress  Breath sounds: Normal breath sounds  No decreased breath sounds or wheezing  Abdominal:      Palpations: Abdomen is soft  Tenderness: There is no abdominal tenderness  Musculoskeletal:         General: No deformity or signs of injury  Cervical back: Normal range of motion and neck supple  Right lower leg: Tenderness present  No swelling  No edema  Left lower leg: Normal       Comments: Tenderness to medial right calf  No overlying erythema, warmth, swelling  Full ROM of right knee and ankle  Negative Cabello test  Neurovascularly intact  Skin:     General: Skin is dry  Coloration: Skin is not jaundiced  Findings: No erythema or rash  Neurological:      General: No focal deficit present  Mental Status: She is alert and oriented to person, place, and time  Mental status is at baseline        Motor: No weakness  Gait: Gait normal    Psychiatric:         Mood and Affect: Mood normal          Behavior: Behavior normal          Thought Content: Thought content normal          Vital Signs  ED Triage Vitals [02/08/23 0843]   Temperature Pulse Respirations Blood Pressure SpO2   98 5 °F (36 9 °C) 86 20 165/88 99 %      Temp Source Heart Rate Source Patient Position - Orthostatic VS BP Location FiO2 (%)   Oral Monitor Sitting Right arm --      Pain Score       7           Vitals:    02/08/23 0843   BP: 165/88   Pulse: 86   Patient Position - Orthostatic VS: Sitting         Visual Acuity      ED Medications  Medications   ibuprofen (MOTRIN) tablet 600 mg (600 mg Oral Given 2/8/23 1222)       Diagnostic Studies  Results Reviewed     None                 VAS lower limb venous duplex study, unilateral/limited    (Results Pending)              Procedures  Procedures         ED Course                                     Phil' Criteria for DVT    Flowsheet Row Most Recent Value   Phil' Criteria for DVT    Active cancer Treatment or palliation within 6 months 0 Filed at: 02/08/2023 1124   Bedridden recently >3 days or major surgery within 12 weeks 0 Filed at: 02/08/2023 1124   Calf swelling >3 cm compared to the other leg 0 Filed at: 02/08/2023 1124   Entire leg swollen 0 Filed at: 02/08/2023 1124   Collateral (nonvaricose) superficial veins present 0 Filed at: 02/08/2023 1124   Localized tenderness along the deep venous system --   Pitting edema, confined to symptomatic leg 0 Filed at: 02/08/2023 1124   Paralysis, paresis, or recent plaster immobilization of the lower extremity 0 Filed at: 02/08/2023 1124   Previously documented DVT 0 Filed at: 02/08/2023 1124   Alternative diagnosis to DVT as likely or more likely 0 Filed at: 02/08/2023 1124   Phil DVT Critera Score 0 Filed at: 02/08/2023 [address]  Patient is a 65 presenting with right calf pain for 3 days    She was walking in the house when she got sharp sudden pain in her right calf  Does not recall any injury or twisting  Reports mild swelling  Weightbearing difficult due to pain  On exam, patient is well-appearing  Right calf without swelling, erythema, warmth  Some tenderness over medial posterior right calf  Full range of motion of knee and ankle  Negative Cabello test   DDx including DVT, Achilles tear, muscle sprain/spasm  Ultrasound negative for DVT  Likely not significant Achilles tear with negative Cabello test   Suspect muscle sprain or spasm  Prescribed Robaxin for use at home and instructed patient to continue taking ibuprofen  Provided work note stating patient can return with limited activity as tolerated  Provided orthopedic contact information if symptoms do not resolve in a week  Return precautions discussed with patient as outlined in AVS and patient verbally expressed understanding  Patient stable at time of discharge and ambulated out of the emergency department  Right leg pain: acute illness or injury  Amount and/or Complexity of Data Reviewed  Radiology: ordered  ECG/medicine tests: ordered  Risk  Prescription drug management  Disposition  Final diagnoses:   Right leg pain     Time reflects when diagnosis was documented in both MDM as applicable and the Disposition within this note     Time User Action Codes Description Comment    2/8/2023 12:37 PM Gracie Edmonds [F41 9] Anxiety     2/8/2023 12:37 PM Claudean Bal Add Barrie Ponto  A] Depression     2/8/2023 12:37 PM Claudean Bal Add [R45 851] Suicidal ideation     2/8/2023 12:43 PM Claudean Bal Modify Barrie Ponto  A] Depression     2/8/2023 12:43 PM Claudean Bal Remove [F41 9] Anxiety     2/8/2023 12:43 PM Claudean Bal Modify [R45 851] Suicidal ideation     2/8/2023 12:43 PM Claudean Bal Remove Barrie Ponto  A] Depression     2/8/2023 12:43 PM Claudean Bal Remove [B41 069] Suicidal ideation     2/8/2023  1:13 PM Claudean Bal Add [J25 480] Right leg pain       ED Disposition     ED Disposition   Discharge    Condition   Stable    Date/Time   Wed Feb 8, 2023  1:13 PM    Comment   [address] [facility] discharge to home/self care                 Follow-up Information     Follow up With Specialties Details Why Contact Info Additional [address] Floor Family Medicine Schedule an appointment as soon as possible for a visit in 1 week As needed [address] [place] [address] [facility] Emergency Department Emergency Medicine Go to  If symptoms worsen [facility] [id number]  [address] [facility] Emergency Department, [address] [facility] , [facility], [place], [address] MUELLER 25 Orthopedic Surgery Schedule an appointment as soon as possible for a visit in 1 week  [address] [facility]  [place] [address] [facility] [address]  [address] [facility], [address] [facility], [address] [facility], [facility], [place], [id number] [phone]          Discharge Medication List as of 2/8/2023  1:15 PM      START taking these medications    Details   methocarbamol (ROBAXIN) 500 mg tablet Take 1 tablet (500 mg total) by mouth 2 (two) times a day, Starting Wed 2/8/2023, Normal         CONTINUE these medications which have NOT CHANGED    Details   acetaminophen (TYLENOL) 500 mg tablet Take 1 tablet (500 mg total) by mouth every 6 (six) hours as needed for mild pain, Starting Mon 11/28/2022, Normal      albuterol (Ventolin HFA) 90 mcg/act inhaler Inhale 2 puffs every 6 (six) hours as needed for wheezing or shortness of breath, Starting Mon 2/6/2023, Normal      benzonatate (TESSALON) 200 MG capsule Take 1 capsule (200 mg total) by mouth 3 (three) times a day as needed for cough, Starting Tue 12/6/2022, Normal      famotidine (PEPCID) 20 mg tablet Take 1 tablet (20 mg total) by mouth daily, Starting Mon 2/6/2023, Normal      fexofenadine (ALLEGRA) 180 MG tablet Take 1 tablet (180 mg total) by mouth daily, Starting Tue 12/6/2022, Normal      fluticasone (FLONASE) 50 mcg/act nasal spray 1 spray into each nostril daily, Starting Tue 12/6/2022, Normal      guaiFENesin (ROBITUSSIN) 100 MG/5ML oral liquid Take 10 mL (200 mg total) by mouth 3 (three) times a day as needed for cough, Starting Tue 12/6/2022, Normal      lidocaine (Lidoderm) 5 % Apply 1 patch topically daily Remove & Discard patch within 12 hours or as directed by MD, Starting Thu 6/9/2022, Normal      ondansetron (ZOFRAN) 4 mg tablet Take 1 tablet (4 mg total) by mouth every 8 (eight) hours as needed for nausea or vomiting, Starting Fri 12/9/2022, Normal      oxymetazoline (AFRIN) 0 05 % nasal spray 2 sprays by Each Nare route 2 (two) times a day, Starting Fri 12/9/2022, Normal      phenylephrine (SUDAFED PE) 10 MG TABS Take 1 tablet (10 mg total) by mouth every 8 (eight) hours as needed for congestion, Starting Tue 12/6/2022, Normal             No discharge procedures on file      PDMP Review     None          ED Provider  Electronically Signed by           Zayda Beasley PA-C  02/08/23 0067

## 2023-02-08 NOTE — Clinical Note
Holley Stephen was seen and treated in our emergency department on 2/8/2023  Diagnosis:     Cuong Perez  may return to work on return date  She may return on this date: 02/10/2023    May return to work with limited activity as tolerated  If you have any questions or concerns, please don't hesitate to call        Isaiah Youngblood PA-C    ______________________________           _______________          _______________  Hospital Representative                              Date                                Time

## 2023-02-08 NOTE — Clinical Note
Aleshia Gross was seen and treated in our emergency department on 2/8/2023  Diagnosis:     Talha Leslie  may return to work on return date  She may return on this date: 02/09/2023         If you have any questions or concerns, please don't hesitate to call        Derrick Burton PA-C    ______________________________           _______________          _______________  Hospital Representative                              Date                                Time

## 2023-04-07 PROBLEM — Z00.00 HEALTHCARE MAINTENANCE: Status: RESOLVED | Noted: 2022-04-13 | Resolved: 2023-04-07

## 2023-05-02 DIAGNOSIS — K21.9 GASTROESOPHAGEAL REFLUX DISEASE WITHOUT ESOPHAGITIS: ICD-10-CM

## 2023-05-07 RX ORDER — FAMOTIDINE 20 MG/1
TABLET, FILM COATED ORAL
Qty: 30 TABLET | Refills: 1 | Status: SHIPPED | OUTPATIENT
Start: 2023-05-07

## 2023-06-22 ENCOUNTER — OFFICE VISIT (OUTPATIENT)
Dept: FAMILY MEDICINE CLINIC | Facility: CLINIC | Age: 58
End: 2023-06-22

## 2023-06-22 VITALS
RESPIRATION RATE: 16 BRPM | OXYGEN SATURATION: 99 % | DIASTOLIC BLOOD PRESSURE: 80 MMHG | WEIGHT: 167 LBS | HEART RATE: 62 BPM | HEIGHT: 61 IN | SYSTOLIC BLOOD PRESSURE: 138 MMHG | BODY MASS INDEX: 31.53 KG/M2 | TEMPERATURE: 97.3 F

## 2023-06-22 DIAGNOSIS — Z12.4 CERVICAL CANCER SCREENING: ICD-10-CM

## 2023-06-22 DIAGNOSIS — Z12.31 SCREENING MAMMOGRAM, ENCOUNTER FOR: ICD-10-CM

## 2023-06-22 DIAGNOSIS — K21.9 GASTROESOPHAGEAL REFLUX DISEASE WITHOUT ESOPHAGITIS: ICD-10-CM

## 2023-06-22 DIAGNOSIS — R21 RASH: ICD-10-CM

## 2023-06-22 DIAGNOSIS — R73.03 PREDIABETES: Primary | ICD-10-CM

## 2023-06-22 DIAGNOSIS — M25.531 RIGHT WRIST PAIN: ICD-10-CM

## 2023-06-22 DIAGNOSIS — J45.20 MILD INTERMITTENT ASTHMA WITHOUT COMPLICATION: ICD-10-CM

## 2023-06-22 DIAGNOSIS — Z12.11 ENCOUNTER FOR SCREENING COLONOSCOPY: ICD-10-CM

## 2023-06-22 PROBLEM — B34.9 VIRAL SYNDROME: Status: RESOLVED | Noted: 2022-11-28 | Resolved: 2023-06-22

## 2023-06-22 LAB — SL AMB POCT HEMOGLOBIN AIC: 6.1 (ref ?–6.5)

## 2023-06-22 PROCEDURE — 83036 HEMOGLOBIN GLYCOSYLATED A1C: CPT | Performed by: FAMILY MEDICINE

## 2023-06-22 PROCEDURE — 99213 OFFICE O/P EST LOW 20 MIN: CPT | Performed by: FAMILY MEDICINE

## 2023-06-22 RX ORDER — ALBUTEROL SULFATE 90 UG/1
2 AEROSOL, METERED RESPIRATORY (INHALATION) EVERY 6 HOURS PRN
Qty: 8.5 G | Refills: 3 | Status: SHIPPED | OUTPATIENT
Start: 2023-06-22

## 2023-06-22 RX ORDER — FAMOTIDINE 20 MG/1
20 TABLET, FILM COATED ORAL DAILY
Qty: 90 TABLET | Refills: 1 | Status: SHIPPED | OUTPATIENT
Start: 2023-06-22

## 2023-06-22 RX ORDER — TRIAMCINOLONE ACETONIDE 1 MG/G
CREAM TOPICAL 2 TIMES DAILY
Qty: 30 G | Refills: 0 | Status: SHIPPED | OUTPATIENT
Start: 2023-06-22

## 2023-06-22 NOTE — ASSESSMENT & PLAN NOTE
· Secondary to overuse from using her computer at work  · Recommended conservative therapy  · Recommend buying an ergonomic mouse  · NSAID's prn  · Has brace at home; recommend wearing that while working  · Consider DeQuervain's Tenosynovitis   · Advised to wear wrist brace and will follow up

## 2023-06-22 NOTE — PROGRESS NOTES
[facility]   [address] [facility]  [place], [address]  Phone#  [phone]  Fax#  [phone]    ASSESSMENT and PLAN  Prediabetes  · A1C-6 1, increased past year from 5 7-5 9-6 1  · Discussed that 30% of prediabetic patients, attempted to this  Discussed at length dietary habits and exercise  Patient stated she will be starting an exercise program for her insurance soon  · Pneumococcal vaccine UTD  · We will recheck an A1c in 3 months  Discussed starting metformin however will give 3 months to see if she can get her A1c down  Will defer to new PCP to see if medication is appropriate    Gastroesophageal reflux disease without esophagitis  · Continue Pepcid 20 mg qd    Right wrist pain  · Resolved  · Secondary to overuse from using her computer at work    Screening mammogram, encounter for  • Mammogram 9/2022: No mammographic evidence of malignancy-repeat 9/2023    Cervical cancer screening  • Cervical Cancer screening UTD-negative PAP; repeat 2024    Encounter for screening colonoscopy  · Colonoscopy UTD 2020-no abnormalities, repeat 2030    Return to clinic in 3 months for annual physical with Dr Bauer Screen    Diagnoses and all orders for this visit:    Prediabetes  -     POCT hemoglobin A1c    Gastroesophageal reflux disease without esophagitis  -     famotidine (PEPCID) 20 mg tablet; Take 1 tablet (20 mg total) by mouth daily    Right wrist pain    Screening mammogram, encounter for    Encounter for screening colonoscopy    Cervical cancer screening    Rash  -     triamcinolone (KENALOG) 0 1 % cream; Apply topically 2 (two) times a day    Mild intermittent asthma without complication  -     albuterol (Ventolin HFA) 90 mcg/act inhaler;  Inhale 2 puffs every 6 (six) hours as needed for wheezing or shortness of breath      HISTORY OF PRESENT ILLNESS  Mago Sousa is a 63 y o  female with a significant PMHx of Prediabetes, GERD, Asthma, who presents to the clinic for  management of their chronic medical conditions  Patient's medical conditions are stable unless noted otherwise above  Patient has not had any recent hospitalizations, or medical emergencies since last visit  Patient has no further complaints other than what is mentioned in the ROS  REVIEW OF SYSTEMS  Review of Systems   Constitutional: Negative for chills, fatigue and fever  HENT: Negative for congestion and sore throat  Respiratory: Negative for cough, chest tightness and shortness of breath  Cardiovascular: Negative for chest pain and leg swelling  Gastrointestinal: Negative for constipation, diarrhea, nausea and vomiting  Endocrine: Negative for polydipsia, polyphagia and polyuria  Genitourinary: Negative for dysuria  Musculoskeletal: Negative for arthralgias  Skin: Negative for rash  Neurological: Negative for dizziness, light-headedness and headaches  Psychiatric/Behavioral: Negative for agitation and behavioral problems  PAST MEDICAL HISTORY   Past Medical History:   Diagnosis Date   • Asthma    • GERD (gastroesophageal reflux disease)    • Prediabetes    • Right wrist pain 9/22/2022   • Vitamin D deficiency 2017     Past Surgical History:   Procedure Laterality Date   • NO PAST SURGERIES       Social History     Socioeconomic History   • Marital status: /Civil Union     Spouse name: Not on file   • Number of children: Not on file   • Years of education: Not on file   • Highest education level: Not on file   Occupational History   • Not on file   Tobacco Use   • Smoking status: Never   • Smokeless tobacco: Never   Vaping Use   • Vaping Use: Never used   Substance and Sexual Activity   • Alcohol use:  Yes   • Drug use: Never   • Sexual activity: Yes     Partners: Male     Birth control/protection: Post-menopausal   Other Topics Concern   • Not on file   Social History Narrative    No preference on Buddhism beliefs      Social Determinants of Health Financial Resource Strain: Low Risk  (7/7/2022)    Overall Financial Resource Strain (CARDIA)    • Difficulty of Paying Living Expenses: Not hard at all   Food Insecurity: No Food Insecurity (7/7/2022)    Hunger Vital Sign    • Worried About Running Out of Food in the Last Year: Never true    • Ran Out of Food in the Last Year: Never true   Transportation Needs: No Transportation Needs (7/7/2022)    PRAPARE - Transportation    • Lack of Transportation (Medical): No    • Lack of Transportation (Non-Medical): No   Physical Activity: Not on file   Stress: No Stress Concern Present (6/24/2021)    [address]    • Feeling of Stress :  Only a little   Social Connections: Not on file   Intimate Partner Violence: Not on file   Housing Stability: Unknown (6/24/2021)    Housing Stability Vital Sign    • Unable to Pay for Housing in the Last Year: No    • Number of Places Lived in the Last Year: Not on file    • Unstable Housing in the Last Year: No     Family History   Problem Relation Age of Onset   • No Known Problems Mother    • No Known Problems Father    • No Known Problems Daughter    • Dementia Maternal Grandmother         Without behavioral disturbance, unspecified dementia type    • Heart failure Maternal Grandmother    • Hypertension Maternal Grandmother    • Diabetes type II Maternal Grandmother    • No Known Problems Paternal Grandfather    • Breast cancer Maternal Aunt         58   • Breast cancer Maternal Aunt         60s   • No Known Problems Maternal Aunt    • Colon cancer Neg Hx    • Ovarian cancer Neg Hx        MEDICATIONS    Current Outpatient Medications:   •  albuterol (Ventolin HFA) 90 mcg/act inhaler, Inhale 2 puffs every 6 (six) hours as needed for wheezing or shortness of breath, Disp: 8 5 g, Rfl: 3  •  famotidine (PEPCID) 20 mg tablet, Take 1 tablet (20 mg total) by mouth daily, Disp: 90 tablet, Rfl: 1  •  triamcinolone (KENALOG) 0 1 "% cream, Apply topically 2 (two) times a day, Disp: 30 g, Rfl: 0  •  fexofenadine (ALLEGRA) 180 MG tablet, Take 1 tablet (180 mg total) by mouth daily, Disp: 90 tablet, Rfl: 0  •  fluticasone (FLONASE) 50 mcg/act nasal spray, 1 spray into each nostril daily, Disp: 16 g, Rfl: 2    PHYSICAL EXAM  Vitals:    06/22/23 1609   BP: 138/80   BP Location: Right arm   Patient Position: Sitting   Cuff Size: Standard   Pulse: 62   Resp: 16   Temp: (!) 97 3 °F (36 3 °C)   TempSrc: Temporal   SpO2: 99%   Weight: 75 8 kg (167 lb)   Height: 5' 1\" (1 549 m)     Wt Readings from Last 3 Encounters:   06/22/23 75 8 kg (167 lb)   04/11/23 74 8 kg (165 lb)   02/08/23 75 4 kg (166 lb 3 6 oz)    , Body mass index is 31 55 kg/m²  Physical Exam  Constitutional:       Appearance: She is well-developed  HENT:      Head: Normocephalic and atraumatic  Eyes:      Pupils: Pupils are equal, round, and reactive to light  Cardiovascular:      Rate and Rhythm: Normal rate and regular rhythm  Heart sounds: Normal heart sounds  Pulmonary:      Effort: Pulmonary effort is normal       Breath sounds: Normal breath sounds  Abdominal:      General: Bowel sounds are normal       Palpations: Abdomen is soft  Musculoskeletal:         General: Normal range of motion  Cervical back: Normal range of motion and neck supple  Skin:     General: Skin is warm  Findings: No erythema or rash  Neurological:      Mental Status: She is alert and oriented to person, place, and time  Psychiatric:         Behavior: Behavior normal          LABS/IMAGING  Hemoglobin A1C   Date Value Ref Range Status   06/22/2023 6 1 6 5 Final   03/22/2022 5 7 (H) Normal 3 8-5 6%; PreDiabetic 5 7-6 4%;  Diabetic >=6 5%; Glycemic control for adults with diabetes <7 0% % Final     HDL, Direct   Date Value Ref Range Status   03/22/2022 53 >=50 mg/dL Final     Comment:     Specimen collection should occur prior to Metamizole administration due to the potential for " brea depressed results  Triglycerides   Date Value Ref Range Status   03/22/2022 108 See Comment mg/dL Final     Comment:     Triglyceride:     0-9Y            <75mg/dL     10Y-17Y         <90 mg/dL       >=18Y     Normal          <150 mg/dL     Borderline High 150-199 mg/dL     High            200-499 mg/dL        Very High       >499 mg/dL    Specimen collection should occur prior to N-Acetylcysteine or Metamizole administration due to the potential for falsely depressed results        WBC   Date Value Ref Range Status   03/22/2022 5 14 4 31 - 10 16 Thousand/uL Final   08/01/2017 6 04 4 31 - 10 16 Thousand/uL Final   10/14/2014 6 16 4 31 - 10 16 Thousand/uL Final     Hemoglobin   Date Value Ref Range Status   03/22/2022 12 1 11 5 - 15 4 g/dL Final   08/01/2017 11 7 11 5 - 15 4 g/dL Final   10/14/2014 12 4 11 5 - 15 4 g/dL Final     Platelets   Date Value Ref Range Status   03/22/2022 349 149 - 390 Thousands/uL Final   08/01/2017 338 149 - 390 Thousands/uL Final   10/14/2014 322 149 - 390 Thousand/uL Final     Potassium   Date Value Ref Range Status   03/22/2022 3 6 3 5 - 5 3 mmol/L Final   07/07/2021 3 9 3 5 - 5 3 mmol/L Final   06/08/2020 4 2 3 5 - 5 3 mmol/L Final   10/14/2014 3 9 3 6 - 5 0 mmol/L Final     Chloride   Date Value Ref Range Status   03/22/2022 110 (H) 100 - 108 mmol/L Final   07/07/2021 106 100 - 108 mmol/L Final   06/08/2020 109 (H) 100 - 108 mmol/L Final   10/14/2014 105 101 - 111 mmol/L Final     CO2   Date Value Ref Range Status   03/22/2022 26 21 - 32 mmol/L Final   07/07/2021 27 21 - 32 mmol/L Final   06/08/2020 26 21 - 32 mmol/L Final   10/14/2014 25 21 - 31 mmol/L Final     Anion Gap   Date Value Ref Range Status   10/14/2014 12 4 - 13 mmol/L Final     BUN   Date Value Ref Range Status   03/22/2022 10 5 - 25 mg/dL Final   07/07/2021 15 5 - 25 mg/dL Final   06/08/2020 13 5 - 25 mg/dL Final   10/14/2014 11 5 - 27 mg/dL Final     Creatinine   Date Value Ref Range Status   03/22/2022 0  85 0 60 - 1 30 mg/dL Final     Comment:     Standardized to IDMS reference method   07/07/2021 0 91 0 60 - 1 30 mg/dL Final     Comment:     Standardized to IDMS reference method   06/08/2020 0 83 0 60 - 1 30 mg/dL Final     Comment:     Standardized to IDMS reference method   10/14/2014 0 85 0 60 - 1 30 mg/dL Final     Comment:     Standardized to IDMS reference method     eGFR   Date Value Ref Range Status   03/22/2022 77 ml/min/1 73sq m Final   07/07/2021 82 ml/min/1 73sq m Final   06/08/2020 93 ml/min/1 73sq m Final     Glucose   Date Value Ref Range Status   10/14/2014 106 65 - 140 mg/dL Final     Comment:     If patient is fasting, the ADA then defines impaired fasting glucose as  >100 mg/dl and diabetes as  >or equal to 126 mg/dl  Calcium   Date Value Ref Range Status   03/22/2022 9 2 8 3 - 10 1 mg/dL Final   07/07/2021 9 4 8 3 - 10 1 mg/dL Final   06/08/2020 8 9 8 3 - 10 1 mg/dL Final   10/14/2014 9 2 8 3 - 10 1 mg/dL Final     AST   Date Value Ref Range Status   03/22/2022 18 5 - 45 U/L Final     Comment:     Specimen collection should occur prior to Sulfasalazine administration due to the potential for falsely depressed results  07/07/2021 24 5 - 45 U/L Final     Comment:     Specimen collection should occur prior to Sulfasalazine administration due to the potential for falsely depressed results  06/08/2020 18 5 - 45 U/L Final     Comment:       Specimen collection should occur prior to Sulfasalazine administration due to the potential for falsely depressed results  10/14/2014 22 10 - 42 U/L Final     ALT   Date Value Ref Range Status   03/22/2022 23 12 - 78 U/L Final     Comment:     Specimen collection should occur prior to Sulfasalazine and/or Sulfapyridine administration due to the potential for falsely depressed results      07/07/2021 28 12 - 78 U/L Final     Comment:     Specimen collection should occur prior to Sulfasalazine and/or Sulfapyridine administration due to the potential "for falsely depressed results  06/08/2020 25 12 - 78 U/L Final     Comment:       Specimen collection should occur prior to Sulfasalazine and/or Sulfapyridine administration due to the potential for falsely depressed results  10/14/2014 24 6 - 78 U/L Final     Alkaline Phosphatase   Date Value Ref Range Status   03/22/2022 95 46 - 116 U/L Final   07/07/2021 120 (H) 46 - 116 U/L Final   06/08/2020 98 46 - 116 U/L Final   10/14/2014 122 (H) 42 - 121 U/L Final     Total Protein   Date Value Ref Range Status   10/14/2014 8 5 (H) 6 4 - 8 2 g/dL Final     Total Bilirubin   Date Value Ref Range Status   10/14/2014 0 3 0 2 - 1 0 mg/dL Final     No results found for: \"TSH\"    This note has been dictated using miCab software  It may contain errors, including improperly dictated words  Please contact physician directly for any questions  Justyna Mandujano   PGY-3    BMI Counseling: Body mass index is 31 55 kg/m²  The BMI is above normal  Nutrition recommendations include decreasing overall calorie intake, 3-5 servings of fruits/vegetables daily and reducing fast food intake  Exercise recommendations include moderate aerobic physical activity for 150 minutes/week    "

## 2023-06-22 NOTE — ASSESSMENT & PLAN NOTE
· A1C-6 1, increased past year from 5 7-5 9-6 1  · Discussed that 30% of prediabetic patients, attempted to this  Discussed at length dietary habits and exercise  Patient stated she will be starting an exercise program for her insurance soon  · Pneumococcal vaccine UTD  · We will recheck an A1c in 3 months  Discussed starting metformin however will give 3 months to see if she can get her A1c down    Will defer to new PCP to see if medication is appropriate

## 2023-07-10 ENCOUNTER — OFFICE VISIT (OUTPATIENT)
Dept: FAMILY MEDICINE CLINIC | Facility: CLINIC | Age: 58
End: 2023-07-10

## 2023-07-10 VITALS
WEIGHT: 168 LBS | HEART RATE: 89 BPM | TEMPERATURE: 97.6 F | SYSTOLIC BLOOD PRESSURE: 138 MMHG | BODY MASS INDEX: 31.74 KG/M2 | OXYGEN SATURATION: 98 % | DIASTOLIC BLOOD PRESSURE: 82 MMHG | RESPIRATION RATE: 18 BRPM

## 2023-07-10 DIAGNOSIS — K52.9 GASTROENTERITIS: Primary | ICD-10-CM

## 2023-07-10 DIAGNOSIS — R11.0 NAUSEA: ICD-10-CM

## 2023-07-10 PROCEDURE — 99213 OFFICE O/P EST LOW 20 MIN: CPT | Performed by: FAMILY MEDICINE

## 2023-07-10 RX ORDER — ONDANSETRON 4 MG/1
4 TABLET, FILM COATED ORAL EVERY 8 HOURS PRN
Qty: 10 TABLET | Refills: 0 | Status: SHIPPED | OUTPATIENT
Start: 2023-07-10

## 2023-07-10 NOTE — LETTER
July 10, 2023     Patient: Shazia Schmidt  YOB: 1965  Date of Visit: 7/10/2023      To Whom it May Concern:    Eber Chencho is under my professional care. Brian Sánchez was seen in my office on 7/10/2023. Brian Sánchez may return to work on 7/12/2023 or earlier if she feels better . If you have any questions or concerns, please don't hesitate to call.          Sincerely,          [facility]        CC: No Recipients

## 2023-07-10 NOTE — PATIENT INSTRUCTIONS
Gastroenteritis   WHAT YOU NEED TO KNOW:   Gastroenteritis, or stomach flu, is an infection of the stomach and intestines. DISCHARGE INSTRUCTIONS:   Call 911 for any of the following: You have trouble breathing or a very fast pulse. Return to the emergency department if:   You see blood in your diarrhea. You cannot stop vomiting. You have not urinated for 12 hours. You feel like you are going to faint. Contact your healthcare provider if:   You have a fever. You continue to vomit or have diarrhea, even after treatment. You see worms in your diarrhea. Your mouth or eyes are dry. You are not urinating as much or as often. You have questions or concerns about your condition or care. Medicines:   Medicines  may be given to stop vomiting or diarrhea, decrease abdominal cramps, or treat an infection. Take your medicine as directed. Contact your healthcare provider if you think your medicine is not helping or if you have side effects. Tell your provider if you are allergic to any medicine. Keep a list of the medicines, vitamins, and herbs you take. Include the amounts, and when and why you take them. Bring the list or the pill bottles to follow-up visits. Carry your medicine list with you in case of an emergency. Manage your symptoms:   Drink liquids as directed. Ask your healthcare provider how much liquid to drink each day, and which liquids are best for you. You may also need to drink an oral rehydration solution (ORS). An ORS has the right amounts of sugar, salt, and minerals in water to replace body fluids. Eat bland foods. When you feel hungry, begin eating soft, bland foods. Examples are bananas, clear soup, potatoes, and applesauce. Do not have dairy products, alcohol, sugary drinks, or drinks with caffeine until you feel better. Rest as much as possible. Slowly start to do more each day when you begin to feel better.     Prevent the spread of gastroenteritis:  Gastroenteritis can spread easily. Keep yourself, your family, and your surroundings clean to help prevent the spread of gastroenteritis:  Wash your hands often. Use soap and water. Wash your hands after you use the bathroom, change a child's diapers, or sneeze. Wash your hands before you prepare or eat food. Clean surfaces and do laundry often. Wash your clothes and towels separately from the rest of the laundry. Clean surfaces in your home with antibacterial  or bleach. Clean food thoroughly and cook safely. Wash raw vegetables before you cook. Cook meat, fish, and eggs fully. Do not use the same dishes for raw meat as you do for other foods. Refrigerate any leftover food immediately. Be aware when you camp or travel. Drink only clean water. Do not drink from rivers or lakes unless you purify or boil the water first. When you travel, drink bottled water and do not add ice. Do not eat fruit that has not been peeled. Do not eat raw fish or meat that is not fully cooked. Follow up with your doctor as directed:  Write down your questions so you remember to ask them during your visits. © Copyright [employer] 2022 Information is for End User's use only and may not be sold, redistributed or otherwise used for commercial purposes. The above information is an  only. It is not intended as medical advice for individual conditions or treatments. Talk to your doctor, nurse or pharmacist before following any medical regimen to see if it is safe and effective for you.

## 2023-07-10 NOTE — PROGRESS NOTES
Name: Candida Washington      : 1965      MRN: [id number]  Encounter Provider: Vashti Ambriz MD  Encounter Date: 7/10/2023   Encounter department: [address] [facility]     1. Gastroenteritis  Assessment & Plan:  - Due to description of symptoms, unremarkable physical exam, normal VS and history of sick contacts, I believe that patient has a possible viral illness  - Reassured patient that this is a self-limited disease and treatment is symptomatic. Advised her to engage in diet as tolerated (blend diet), avoid sugary food and drinks. She was handed-out educational papers. - She can use Zofran as needed, since nausea is bothersome to her  - Recommend proper oral hydration  - Return if no improvement or worsening for the the next 7 days. Orders:  -     ondansetron (ZOFRAN) 4 mg tablet; Take 1 tablet (4 mg total) by mouth every 8 (eight) hours as needed for nausea or vomiting    2. Nausea  -     ondansetron (ZOFRAN) 4 mg tablet; Take 1 tablet (4 mg total) by mouth every 8 (eight) hours as needed for nausea or vomiting         Subjective      Candida Washington is a 60-year-old female patient who presents for a SAME DAY VISIT due to feeling nauseous and having diarrhea. Patient reports that symptoms started over the weekend and is not similar to her symptoms of GERD. She feels nauseated all day long since then and not able to tolerate much food. She had 3 episodes of diarrhea since then. She also reports decreased appetite and abdominal cramps. She has not been checking her temperature at home.  and daughter have similar symptoms, and they started around same time as hers. Review of Systems   Constitutional: Negative for chills and fever. HENT: Negative for ear pain and sore throat. Eyes: Negative for pain and visual disturbance. Respiratory: Negative for cough and shortness of breath.     Cardiovascular: Negative for chest pain and palpitations. Gastrointestinal: Positive for abdominal pain, diarrhea and nausea. Negative for vomiting. Genitourinary: Negative for dysuria and hematuria. Musculoskeletal: Negative for arthralgias and back pain. Skin: Negative for color change and rash. Neurological: Negative for seizures and syncope. All other systems reviewed and are negative. Current Outpatient Medications on File Prior to Visit   Medication Sig   • albuterol (Ventolin HFA) 90 mcg/act inhaler Inhale 2 puffs every 6 (six) hours as needed for wheezing or shortness of breath   • famotidine (PEPCID) 20 mg tablet Take 1 tablet (20 mg total) by mouth daily   • fexofenadine (ALLEGRA) 180 MG tablet Take 1 tablet (180 mg total) by mouth daily   • fluticasone (FLONASE) 50 mcg/act nasal spray 1 spray into each nostril daily   • triamcinolone (KENALOG) 0.1 % cream Apply topically 2 (two) times a day       Objective     /82 (BP Location: Left arm, Patient Position: Sitting, Cuff Size: Adult)   Pulse 89   Temp 97.6 °F (36.4 °C) (Temporal)   Resp 18   Wt 76.2 kg (168 lb)   SpO2 98%   BMI 31.74 kg/m²     Physical Exam  Constitutional:       General: She is not in acute distress. Appearance: Normal appearance. She is normal weight. She is not ill-appearing, toxic-appearing or diaphoretic. HENT:      Head: Normocephalic and atraumatic. Nose: Nose normal. No congestion. Mouth/Throat:      Mouth: Mucous membranes are moist.   Eyes:      General:         Right eye: No discharge. Left eye: No discharge. Conjunctiva/sclera: Conjunctivae normal.   Cardiovascular:      Rate and Rhythm: Normal rate and regular rhythm. Heart sounds: Normal heart sounds. No murmur heard. Pulmonary:      Effort: Pulmonary effort is normal. No respiratory distress. Breath sounds: Normal breath sounds. No wheezing or rhonchi.    Abdominal:      General: Bowel sounds are normal. There is no distension. Palpations: Abdomen is soft. There is no mass. Tenderness: There is no abdominal tenderness. There is no guarding or rebound. Musculoskeletal:         General: Normal range of motion. Skin:     General: Skin is warm. Neurological:      General: No focal deficit present. Mental Status: She is alert and oriented to person, place, and time. Cranial Nerves: No cranial nerve deficit. Psychiatric:         Mood and Affect: Mood normal.         Behavior: Behavior normal.         Thought Content:  Thought content normal.         Judgment: Judgment normal.       Prasad Mack MD

## 2023-07-10 NOTE — ASSESSMENT & PLAN NOTE
- Due to description of symptoms, unremarkable physical exam, normal VS and history of sick contacts, I believe that patient has a possible viral illness  - Reassured patient that this is a self-limited disease and treatment is symptomatic. Advised her to engage in diet as tolerated (blend diet), avoid sugary food and drinks. She was handed-out educational papers. - She can use Zofran as needed, since nausea is bothersome to her  - Recommend proper oral hydration  - Return if no improvement or worsening for the the next 7 days.

## 2023-08-03 ENCOUNTER — ANNUAL EXAM (OUTPATIENT)
Dept: OBGYN CLINIC | Facility: CLINIC | Age: 58
End: 2023-08-03

## 2023-08-03 VITALS — DIASTOLIC BLOOD PRESSURE: 65 MMHG | SYSTOLIC BLOOD PRESSURE: 142 MMHG | BODY MASS INDEX: 31.74 KG/M2 | WEIGHT: 168 LBS

## 2023-08-03 DIAGNOSIS — Z12.31 ENCOUNTER FOR SCREENING MAMMOGRAM FOR MALIGNANT NEOPLASM OF BREAST: ICD-10-CM

## 2023-08-03 DIAGNOSIS — Z12.39 ENCOUNTER FOR BREAST CANCER SCREENING USING NON-MAMMOGRAM MODALITY: ICD-10-CM

## 2023-08-03 DIAGNOSIS — Z12.4 SCREENING FOR CERVICAL CANCER: ICD-10-CM

## 2023-08-03 DIAGNOSIS — Z01.419 ENCOUNTER FOR GYNECOLOGICAL EXAMINATION WITHOUT ABNORMAL FINDING: Primary | ICD-10-CM

## 2023-08-03 PROBLEM — R73.03 PREDIABETES: Status: RESOLVED | Noted: 2022-04-13 | Resolved: 2023-08-03

## 2023-08-03 PROBLEM — J45.909 ASTHMA: Status: RESOLVED | Noted: 2017-08-01 | Resolved: 2023-08-03

## 2023-08-03 PROBLEM — K21.9 GASTROESOPHAGEAL REFLUX DISEASE WITHOUT ESOPHAGITIS: Status: RESOLVED | Noted: 2020-02-13 | Resolved: 2023-08-03

## 2023-08-03 PROBLEM — K52.9 GASTROENTERITIS: Status: RESOLVED | Noted: 2023-07-10 | Resolved: 2023-08-03

## 2023-08-03 PROBLEM — Z12.11 ENCOUNTER FOR SCREENING COLONOSCOPY: Status: RESOLVED | Noted: 2023-06-22 | Resolved: 2023-08-03

## 2023-08-03 PROBLEM — Z00.00 HEALTHCARE MAINTENANCE: Status: RESOLVED | Noted: 2022-04-13 | Resolved: 2023-08-03

## 2023-08-03 PROBLEM — R09.81 MILD NASAL CONGESTION: Status: RESOLVED | Noted: 2022-06-09 | Resolved: 2023-08-03

## 2023-08-03 PROCEDURE — S0612 ANNUAL GYNECOLOGICAL EXAMINA: HCPCS | Performed by: NURSE PRACTITIONER

## 2023-08-03 NOTE — PATIENT INSTRUCTIONS
Thank you for your confidence in our team.   We appreciate you and welcome your feedback. If you receive a survey from us, please take a few moments to let us know how we are doing. Sincerely,  DEANDRE Huber       OBESITY     Obesity is defined as a body mass index (BMI) which is greater than 30. Your Body mass index is 31.74 kg/m². .    The risks of obesity include  many health problems, such as injuries or physical disability. You may need tests to check for the following:  Diabetes     High blood pressure or high cholesterol     Heart disease     Gallbladder or liver disease     Cancer of the colon, breast, prostate, liver, or kidney     Sleep apnea     Arthritis or gout    Seek care immediately if:   You have a severe headache, confusion, or difficulty speaking. You have weakness on one side of your body. You have chest pain, sweating, or shortness of breath. Contact your healthcare provider if:   You have symptoms of gallbladder or liver disease, such as pain in your upper abdomen. You have knee or hip pain and discomfort while walking. You have symptoms of diabetes, such as intense hunger and thirst, and frequent urination. You have symptoms of sleep apnea, such as snoring or daytime sleepiness. You have questions or concerns about your condition or care. Treatment for obesity  focuses on helping you lose weight to improve your health. Even a small decrease in BMI can reduce the risk for many health problems. Your healthcare provider will help you set a weight-loss goal.  Lifestyle changes  are the first step in treating obesity. These include making healthy food choices and getting regular physical activity. Your healthcare provider may suggest a weight-loss program that involves coaching, education, and therapy. Medicine  may help you lose weight when it is used with a healthy diet and physical activity.      Surgery  can help you lose weight if you are very obese and have other health problems. There are several types of weight-loss surgery. Ask your healthcare provider for more information. Be successful losing weight:   Set small, realistic goals. An example of a small goal is to walk for 20 minutes 5 days a week. Anther goal is to lose 5% of your body weight. Tell friends, family members, and coworkers about your goals  and ask for their support. Ask a friend to lose weight with you, or join a weight-loss support group. Identify foods or triggers that may cause you to overeat , and find ways to avoid them. Remove tempting high-calorie foods from your home and workplace. Place a bowl of fresh fruit on your kitchen counter. If stress causes you to eat, then find other ways to cope with stress. Keep a diary to track what you eat and drink. Also write down how many minutes of physical activity you do each day. Weigh yourself once a week and record it in your diary. Eating changes: You will need to eat 500 to 1,000 fewer calories each day than you currently eat to lose 1 to 2 pounds a week. The following changes will help you cut calories:  Eat smaller portions. Use small plates, no larger than 9 inches in diameter. Fill your plate half full of fruits and vegetables. Measure your food using measuring cups until you know what a serving size looks like. Eat 3 meals and 1 or 2 snacks each day. Plan your meals in advance. Veola Bolder and eat at home most of the time. Eat slowly. Eat fruits and vegetables at every meal.  They are low in calories and high in fiber, which makes you feel full. Do not add butter, margarine, or cream sauce to vegetables. Use herbs to season steamed vegetables. Eat less fat and fewer fried foods. Eat more baked or grilled chicken and fish. These protein sources are lower in calories and fat than red meat. Limit fast food. Dress your salads with olive oil and vinegar instead of bottled dressing.      Limit the amount of sugar you eat. Do not drink sugary beverages. Limit alcohol. Activity changes:  Physical activity is good for your body in many ways. It helps you burn calories and build strong muscles. It decreases stress and depression, and improves your mood. It can also help you sleep better. Talk to your healthcare provider before you begin an exercise program.  Exercise for at least 30 minutes 5 days a week. Start slowly. Set aside time each day for physical activity that you enjoy and that is convenient for you. It is best to do both weight training and an activity that increases your heart rate, such as walking, bicycling, or swimming. Find ways to be more active. Do yard work and housecleaning. Walk up the stairs instead of using elevators. Spend your leisure time going to events that require walking, such as outdoor festivals or fairs. This extra physical activity can help you lose weight and keep it off. Follow up with your primary healthcare provider as directed. You may need to meet with a dietitian. Write down your questions so you remember to ask them during your visits.

## 2023-08-03 NOTE — PROGRESS NOTES
Yonatan Sommer is a 63 y.o. female who presents today for annual GYN exam.  Her last pap smear was performed 2022 and result was NILM with negative HPV. She reports no history of abnormal pap smears in her past.  Her last mammogram was performed 2022 and result was WNL. She had colon cancer screening performed 2020. She reports menses as absent since . Her general medical history has been reviewed and she reports it as follows:    Past Medical History:   Diagnosis Date   • Asthma    • GERD (gastroesophageal reflux disease)    • Prediabetes      Past Surgical History:   Procedure Laterality Date   • NO PAST SURGERIES       OB History        3    Para   1    Term   1       0    AB   2    Living   1       SAB   0    IAB   2    Ectopic   0    Multiple   0    Live Births   1               Social History     Tobacco Use   • Smoking status: Never   • Smokeless tobacco: Never   Vaping Use   • Vaping Use: Never used   Substance Use Topics   • Alcohol use: Yes   • Drug use: Never     Social History     Substance and Sexual Activity   Sexual Activity Yes   • Partners: Male   • Birth control/protection: Post-menopausal     Cancer-related family history includes Breast cancer in her maternal aunt and maternal aunt. There is no history of Colon cancer or Ovarian cancer. Current Outpatient Medications   Medication Instructions   • albuterol (Ventolin HFA) 90 mcg/act inhaler 2 puffs, Inhalation, Every 6 hours PRN   • famotidine (PEPCID) 20 mg, Oral, Daily   • fluticasone (FLONASE) 50 mcg/act nasal spray 1 spray, Nasal, Daily   • triamcinolone (KENALOG) 0.1 % cream Topical, 2 times daily       Review of Systems:  Review of Systems   Constitutional: Negative. Gastrointestinal: Negative. Genitourinary: Negative for difficulty urinating, pelvic pain, vaginal bleeding and vaginal discharge. Skin: Negative.         Physical Exam:  /65   Wt 76.2 kg (168 lb)   BMI 31.74 kg/m²   Physical Exam  Constitutional:       General: She is not in acute distress. Appearance: She is well-developed. Genitourinary:      Vulva normal.      No lesions in the vagina. Right Adnexa: not tender and no mass present. Left Adnexa: not tender and no mass present. No cervical motion tenderness or lesion. Uterus is not tender. Breasts:     Right: No mass, nipple discharge, skin change or tenderness. Left: No mass, nipple discharge, skin change or tenderness. Neck:      Thyroid: No thyromegaly. Cardiovascular:      Rate and Rhythm: Normal rate and regular rhythm. Pulmonary:      Effort: Pulmonary effort is normal.   Abdominal:      Palpations: Abdomen is soft. Tenderness: There is no abdominal tenderness. Musculoskeletal:      Cervical back: Neck supple. Neurological:      Mental Status: She is alert and oriented to person, place, and time. Skin:     General: Skin is warm and dry. Vitals reviewed. Assessment/Plan:   1. Normal well-woman GYN exam.  2. Cervical cancer screening:  Normal cervical exam.  Pap smear not indicated at this time. Has not received HPV vaccine in the past.   3. STD screening:  Patient declines. 4. Breast cancer screening:  Normal breast exam.  Order placed for bilateral screening mammogram.  Reviewed breast self-awareness. 5. Colon cancer screening:  Up to date. 6. Depression Screening: Patient's depression screening was assessed with a PHQ-2 score of 0. Their PHQ-9 score was 1. Clinically patient does not have depression. No treatment is required. 7. BMI Counseling: Body mass index is 31.74 kg/m². Discussed the patient's BMI with her. The BMI is above normal. Nutrition recommendations include reducing portion sizes and decreasing overall calorie intake.    8. Return to office in 1 year for annual GYN exam. Detail Level: Zone Price (Use Numbers Only, No Special Characters Or $): 0 Frost (0,1+,2+,3+,4+): 3+ Consent: Prior to the procedure, written consent was obtained and risks were reviewed, including but not limited to: redness, peeling, blistering, pigmentary change, scarring, infection, and pain. Erythema: mild Prep: The treated area was degreased with pre-peel cleanser, and vaseline was applied for protection of mucous membranes. Post-Care Instructions: I reviewed with the patient in detail post-care instructions. Patient should avoid sun exposure and wear sun protection. Number Of Coats: 2 Chemical Peel: 10% TCA Post Peel Care: After the procedure, the treatment area was washed with soap and water, and a post-peel cream was applied. Sun protection and post-care instructions were reviewed with the patient. Done by AT

## 2023-09-20 ENCOUNTER — TELEPHONE (OUTPATIENT)
Dept: FAMILY MEDICINE CLINIC | Facility: CLINIC | Age: 58
End: 2023-09-20

## 2023-09-20 NOTE — TELEPHONE ENCOUNTER
Hi, my name is [facility]. My callback number is [phone]. Today is Monday, September 18th. I am reaching out because I was supposed to have gotten a call to be scheduled for a physical with a primary care. I was seeing Nely Lomax and I was referred. I believe it's Shellie Cortes. I was told sometime in September I would receive a phone call that I was on a waiting list and that I would have an appointment scheduled and someone would either leave a message or reach out to me. I haven't gotten any calls and I'm trying to inquire as to what the status is for this appointment for my physical, which was to the end of July. So if someone can please kindly return to school or please get me set up with an appointment, preferably at least two weeks out from the date that you get this message. Again, if it hasn't already been scheduled, if someone can please give me a call to advise. Again, that number is [phone]. Thank you and have a great evening. Patient scheduled.

## 2023-10-12 ENCOUNTER — OFFICE VISIT (OUTPATIENT)
Dept: FAMILY MEDICINE CLINIC | Facility: CLINIC | Age: 58
End: 2023-10-12

## 2023-10-12 VITALS
OXYGEN SATURATION: 99 % | HEART RATE: 80 BPM | DIASTOLIC BLOOD PRESSURE: 80 MMHG | HEIGHT: 61 IN | SYSTOLIC BLOOD PRESSURE: 122 MMHG | TEMPERATURE: 98.7 F | WEIGHT: 174 LBS | BODY MASS INDEX: 32.85 KG/M2 | RESPIRATION RATE: 16 BRPM

## 2023-10-12 DIAGNOSIS — Z23 ENCOUNTER FOR IMMUNIZATION: ICD-10-CM

## 2023-10-12 DIAGNOSIS — R73.03 PREDIABETES: Primary | ICD-10-CM

## 2023-10-12 DIAGNOSIS — K21.9 GASTROESOPHAGEAL REFLUX DISEASE, UNSPECIFIED WHETHER ESOPHAGITIS PRESENT: ICD-10-CM

## 2023-10-12 PROCEDURE — 90686 IIV4 VACC NO PRSV 0.5 ML IM: CPT | Performed by: FAMILY MEDICINE

## 2023-10-12 PROCEDURE — 90471 IMMUNIZATION ADMIN: CPT | Performed by: FAMILY MEDICINE

## 2023-10-12 PROCEDURE — 99214 OFFICE O/P EST MOD 30 MIN: CPT | Performed by: FAMILY MEDICINE

## 2023-10-12 RX ORDER — ZOSTER VACCINE RECOMBINANT, ADJUVANTED 50 MCG/0.5
0.5 KIT INTRAMUSCULAR ONCE
Qty: 1 EACH | Refills: 1 | Status: SHIPPED | OUTPATIENT
Start: 2023-10-12 | End: 2023-10-12

## 2023-10-12 NOTE — PROGRESS NOTES
Name: Eden Richards      : 1965      MRN: [id number]  Encounter Provider: Marcos Sutton MD  Encounter Date: 10/12/2023   Encounter department: [address] [facility]     1. Prediabetes  Assessment & Plan:  Most recent hemoglobin A1c of 6.1  Recommend low carbohydrate diet. Recommend regular exercise 30 minutes 5 days a week  We will reassess hemoglobin A1c and consider initiating pharmacotherapy if elevated    Orders:  -     Hemoglobin A1C; Future  -     Lipid panel; Future  -     CBC and differential; Future  -     Comprehensive metabolic panel; Future    2. Gastroesophageal reflux disease, unspecified whether esophagitis present  Assessment & Plan:  -Stable  -On pepcid  -Lifestle modification counseling provided        3. Encounter for immunization  -     influenza vaccine, quadrivalent, 0.5 mL, preservative-free, for adult and pediatric patients 6 mos+ (AFLURIA, FLUARIX, FLULAVAL, FLUZONE)  -     Zoster Vac Recomb Adjuvanted (Shingrix) 50 MCG/0.5ML SUSR; Inject 0.5 mL into a muscle once for 1 dose Repeat dose in 2 to 6 months           Subjective      41-year-old female with a history of prediabetes and GERD who presents today for follow-up. She is doing quite well. She is up-to-date on her Pap screening. She has mammogram coming up. She has done colonoscopy recently. She is getting her vaccinations today other than the COVID-vaccine. She will think about getting the COVID-vaccine in the future      Review of Systems   Constitutional:  Negative for chills, diaphoresis, fatigue and fever. HENT:  Negative for congestion, postnasal drip, rhinorrhea, sinus pressure, sinus pain and sneezing. Respiratory:  Negative for cough and shortness of breath. Cardiovascular:  Negative for chest pain, palpitations and leg swelling. Gastrointestinal:  Negative for abdominal pain, constipation, diarrhea, nausea and vomiting.    Endocrine: Negative for polydipsia, polyphagia and polyuria. Musculoskeletal:  Negative for arthralgias. Skin:  Negative for rash. Neurological:  Negative for seizures, syncope, numbness and headaches. Psychiatric/Behavioral:  Negative for dysphoric mood. Current Outpatient Medications on File Prior to Visit   Medication Sig    albuterol (Ventolin HFA) 90 mcg/act inhaler Inhale 2 puffs every 6 (six) hours as needed for wheezing or shortness of breath    famotidine (PEPCID) 20 mg tablet Take 1 tablet (20 mg total) by mouth daily    fluticasone (FLONASE) 50 mcg/act nasal spray 1 spray into each nostril daily    triamcinolone (KENALOG) 0.1 % cream Apply topically 2 (two) times a day       Objective     /80 (BP Location: Right arm, Patient Position: Sitting, Cuff Size: Standard)   Pulse 80   Temp 98.7 °F (37.1 °C) (Temporal)   Resp 16   Ht 5' 1" (1.549 m)   Wt 78.9 kg (174 lb)   SpO2 99%   BMI 32.88 kg/m²     Physical Exam  Vitals and nursing note reviewed. Constitutional:       General: She is not in acute distress. Appearance: Normal appearance. She is well-developed. She is not ill-appearing, toxic-appearing or diaphoretic. HENT:      Head: Normocephalic and atraumatic. Right Ear: Tympanic membrane, ear canal and external ear normal. There is no impacted cerumen. Left Ear: Tympanic membrane, ear canal and external ear normal. There is no impacted cerumen. Nose: Nose normal.      Mouth/Throat:      Mouth: Mucous membranes are moist.      Pharynx: No oropharyngeal exudate or posterior oropharyngeal erythema. Eyes:      General: No scleral icterus. Right eye: No discharge. Left eye: No discharge. Extraocular Movements: Extraocular movements intact. Conjunctiva/sclera: Conjunctivae normal.   Neck:      Thyroid: No thyromegaly. Vascular: No JVD. Trachea: No tracheal deviation.    Cardiovascular:      Rate and Rhythm: Normal rate and regular rhythm. Heart sounds: Normal heart sounds. No murmur heard. No friction rub. No gallop. Pulmonary:      Effort: Pulmonary effort is normal. No respiratory distress. Breath sounds: Normal breath sounds. No stridor. No wheezing or rhonchi. Abdominal:      General: Bowel sounds are normal. There is no distension. Palpations: Abdomen is soft. There is no mass. Tenderness: There is no abdominal tenderness. There is no guarding or rebound. Hernia: No hernia is present. Musculoskeletal:         General: Normal range of motion. Cervical back: Normal range of motion. Right lower leg: No edema. Left lower leg: No edema. Skin:     General: Skin is warm. Capillary Refill: Capillary refill takes less than 2 seconds. Findings: No lesion or rash. Neurological:      General: No focal deficit present. Mental Status: She is alert and oriented to person, place, and time. Cranial Nerves: No cranial nerve deficit. Motor: No weakness or abnormal muscle tone.       Gait: Gait normal.   Psychiatric:         Mood and Affect: Mood normal.         Behavior: Behavior normal.       Martina Coats MD

## 2023-10-12 NOTE — ASSESSMENT & PLAN NOTE
Most recent hemoglobin A1c of 6.1  Recommend low carbohydrate diet.   Recommend regular exercise 30 minutes 5 days a week  We will reassess hemoglobin A1c and consider initiating pharmacotherapy if elevated

## 2023-11-13 ENCOUNTER — OFFICE VISIT (OUTPATIENT)
Dept: FAMILY MEDICINE CLINIC | Facility: CLINIC | Age: 58
End: 2023-11-13

## 2023-11-13 VITALS
HEART RATE: 83 BPM | DIASTOLIC BLOOD PRESSURE: 76 MMHG | TEMPERATURE: 98 F | HEIGHT: 61 IN | BODY MASS INDEX: 33.04 KG/M2 | RESPIRATION RATE: 16 BRPM | SYSTOLIC BLOOD PRESSURE: 134 MMHG | WEIGHT: 175 LBS | OXYGEN SATURATION: 97 %

## 2023-11-13 DIAGNOSIS — G44.89 OTHER HEADACHE SYNDROME: ICD-10-CM

## 2023-11-13 DIAGNOSIS — R42 DIZZINESS: Primary | ICD-10-CM

## 2023-11-13 DIAGNOSIS — R10.9 STOMACH ACHE: ICD-10-CM

## 2023-11-13 PROCEDURE — 99214 OFFICE O/P EST MOD 30 MIN: CPT | Performed by: STUDENT IN AN ORGANIZED HEALTH CARE EDUCATION/TRAINING PROGRAM

## 2023-11-13 NOTE — LETTER
November 13, 2023     Patient: Sully Castorena [facility]  YOB: 1965  Date of Visit: 11/13/2023      To Whom it May Concern:    Tessa Larose is under my professional care. Jesse Daily was seen in my office on 11/13/2023. Jesse Daily may return to work on Wednesday 11/15/2023 or sooner if feeling well enough . If you have any questions or concerns, please don't hesitate to call.          Sincerely,        Carlita Henson, [id number]

## 2023-11-13 NOTE — ASSESSMENT & PLAN NOTE
Unrelated to positional change, hearing loss. Reports adequate hydration. No other symptoms concerning for ACS. Consider labyrinthitis versus BPPV. - Recommended hydration of 2 L/day [discouraged excessive or insufficient water intake].   - Patient to complete labs on Saturday and will add magnesium to previous orders.  - Observe triggering factors and return if symptoms persist  - ED precautions given

## 2023-11-13 NOTE — PROGRESS NOTES
Name: Mady Rios      : 1965      MRN: [id number]  Encounter Provider: Baldomero Cox  Encounter Date: 2023   Encounter department: [address] [facility] [address]     1. Dizziness  Assessment & Plan:  Unrelated to positional change, hearing loss. Reports adequate hydration. No other symptoms concerning for ACS. Consider labyrinthitis versus BPPV. - Recommended hydration of 2 L/day [discouraged excessive or insufficient water intake]. - Patient to complete labs on Saturday and will add magnesium to previous orders.  - Observe triggering factors and return if symptoms persist  - ED precautions given    Orders:  -     Magnesium; Future    2. Other headache syndrome    3. Stomach ache           Subjective      Marcelino Gilbert [place] is a very pleasant 63 y.o. female who has a PMH of prediabetes, GERD and presents for same day visit today with complaints of stomachache and diarrhea and headache x 1 day. Patient has no further complaints other than what is mentioned below and in the ROS. Patient reports generalized central abdominal cramping with 3 episodes of diarrhea yesterday - no blood or melena. Had food from restaurant not shared but  also having symptoms of abdominal discomfort. Denies any vomiting but had nauseousness. Mild generalized headache all symptoms improved today. Patient also complains of dizziness intermittently episodes 2-3 times per week lasting less than a minute with no significant obvious triggers. Patient has had onset while sitting with no significant movement of head direction. Also had symptoms over the past weekend while getting dressed and doing necessary bending. Patient denies any chest pain, palpitations, shortness of breath, diaphoresis, pedal edema/calf pain, nausea or vomiting associated with these episodes. Patient denies any hearing loss, tinnitus.   Does express having increased stress at work and regular intake of teas and less water while at work however episodes do occur while at home. Review of Systems   Constitutional:  Negative for chills and fever. HENT:  Negative for ear pain and sore throat. Eyes:  Negative for pain and visual disturbance. Respiratory:  Negative for cough and shortness of breath. Cardiovascular:  Negative for chest pain and palpitations. Gastrointestinal:  Positive for abdominal pain and diarrhea. Negative for vomiting. Genitourinary:  Negative for dysuria and hematuria. Musculoskeletal:  Negative for arthralgias and back pain. Skin:  Negative for color change and rash. Neurological:  Positive for dizziness, light-headedness and headaches. Negative for seizures and syncope. All other systems reviewed and are negative. Current Outpatient Medications on File Prior to Visit   Medication Sig    albuterol (Ventolin HFA) 90 mcg/act inhaler Inhale 2 puffs every 6 (six) hours as needed for wheezing or shortness of breath    famotidine (PEPCID) 20 mg tablet Take 1 tablet (20 mg total) by mouth daily    fluticasone (FLONASE) 50 mcg/act nasal spray 1 spray into each nostril daily    [DISCONTINUED] triamcinolone (KENALOG) 0.1 % cream Apply topically 2 (two) times a day       Objective     /76 (Patient Position: Standing)   Pulse 83   Temp 98 °F (36.7 °C) (Temporal)   Resp 16   Ht 5' 1" (1.549 m)   Wt 79.4 kg (175 lb)   SpO2 97%   BMI 33.07 kg/m²   Orthostatic blood pressures with no significant changes indicative of orthostasis. Physical Exam  Vitals reviewed. Constitutional:       General: She is not in acute distress. Appearance: She is well-developed. HENT:      Head: Normocephalic. Nose: Nose normal.      Mouth/Throat:      Mouth: Mucous membranes are moist.      Pharynx: Oropharynx is clear. Eyes:      Conjunctiva/sclera: Conjunctivae normal.   Neck:      Thyroid: No thyromegaly.    Cardiovascular: Rate and Rhythm: Normal rate and regular rhythm. Pulses: Normal pulses. Heart sounds: Normal heart sounds. No murmur heard. No friction rub. No gallop. Pulmonary:      Effort: Pulmonary effort is normal. No respiratory distress. Breath sounds: Normal breath sounds. No wheezing, rhonchi or rales. Abdominal:      General: Bowel sounds are normal. There is no distension. Palpations: Abdomen is soft. There is no mass. Tenderness: There is no abdominal tenderness. There is no right CVA tenderness or left CVA tenderness. Musculoskeletal:         General: Normal range of motion. Cervical back: Normal range of motion. Right lower leg: No edema. Left lower leg: No edema. Skin:     Findings: No rash. Neurological:      General: No focal deficit present. Mental Status: She is alert.      [address]

## 2023-12-26 ENCOUNTER — OFFICE VISIT (OUTPATIENT)
Dept: FAMILY MEDICINE CLINIC | Facility: CLINIC | Age: 58
End: 2023-12-26

## 2023-12-26 VITALS
TEMPERATURE: 98.7 F | BODY MASS INDEX: 32.66 KG/M2 | OXYGEN SATURATION: 97 % | SYSTOLIC BLOOD PRESSURE: 160 MMHG | DIASTOLIC BLOOD PRESSURE: 80 MMHG | RESPIRATION RATE: 16 BRPM | HEART RATE: 90 BPM | WEIGHT: 173 LBS | HEIGHT: 61 IN

## 2023-12-26 DIAGNOSIS — B34.9 VIRAL ILLNESS: Primary | ICD-10-CM

## 2023-12-26 PROCEDURE — 87636 SARSCOV2 & INF A&B AMP PRB: CPT | Performed by: NURSE PRACTITIONER

## 2023-12-26 PROCEDURE — 99214 OFFICE O/P EST MOD 30 MIN: CPT | Performed by: NURSE PRACTITIONER

## 2023-12-26 NOTE — PROGRESS NOTES
COVID-19 Outpatient Progress Note    Assessment/Plan:    Problem List Items Addressed This Visit    None  Visit Diagnoses     Viral illness    -  Primary    Relevant Orders    Covid/Flu- Office Collect Normal           Disposition:     PCR testing will be performed to test for COVID-19/Influenza.     Discussed supportive care, continue increased fluids, acetaminophen/ ibuprofen for fever/ bodyaches, OTC cough/ cold medications.  Work note provided.     I have spent a total time of 20 minutes on the day of the encounter for this patient including       Encounter provider: [facility] [place]     Provider located at: [facility] [place]  [facility] [place]  [address]  [facility] [address] [phone]     Recent Visits  No visits were found meeting these conditions.  Showing recent visits within past 7 days and meeting all other requirements  Today's Visits  Date Type Provider Dept   12/26/23 Office Visit [facility] [place] [facility] [place]   Showing today's visits and meeting all other requirements  Future Appointments  No visits were found meeting these conditions.  Showing future appointments within next 150 days and meeting all other requirements     Subjective:   Ila Oshea is a 58 y.o. female who is concerned about COVID-19. Patient's symptoms include fever, chills, fatigue, nasal congestion, rhinorrhea, chest tightness and myalgias. Patient denies malaise, sore throat, anosmia, loss of taste, cough, shortness of breath, abdominal pain, nausea, vomiting, diarrhea and headaches.     - Date of symptom onset: 12/25/2023  - Date of exposure: 12/24/2023      COVID-19 vaccination status: Not vaccinated    Exposure:   Contact with a person who is under investigation (PUI) for or who is positive for COVID-19 within the last 14 days?: Yes    Hospitalized recently for fever and/or lower respiratory symptoms?: No      Currently a healthcare worker that is involved  "in direct patient care?: No      Works in a special setting where the risk of COVID-19 transmission may be high? (this may include long-term care, correctional and MCC facilities; homeless shelters; assisted-living facilities and group homes.): No      Resident in a special setting where the risk of COVID-19 transmission may be high? (this may include long-term care, correctional and MCC facilities; homeless shelters; assisted-living facilities and group homes.): No      Lab Results   Component Value Date    SARSCOV2 Negative 04/11/2023    SARSCOV2 Not Detected 07/16/2020    SARSCOVAG Negative 04/11/2023       Review of Systems   Constitutional:  Positive for chills, fatigue and fever.   HENT:  Positive for congestion and rhinorrhea. Negative for sore throat.    Respiratory:  Positive for chest tightness. Negative for cough and shortness of breath.    Gastrointestinal:  Negative for abdominal pain, diarrhea, nausea and vomiting.   Musculoskeletal:  Positive for myalgias.   Neurological:  Negative for headaches.     Current Outpatient Medications on File Prior to Visit   Medication Sig   • albuterol (Ventolin HFA) 90 mcg/act inhaler Inhale 2 puffs every 6 (six) hours as needed for wheezing or shortness of breath   • famotidine (PEPCID) 20 mg tablet Take 1 tablet (20 mg total) by mouth daily   • fluticasone (FLONASE) 50 mcg/act nasal spray 1 spray into each nostril daily       Objective:    /80 (BP Location: Right arm, Patient Position: Sitting, Cuff Size: Standard)   Pulse 90   Temp 98.7 °F (37.1 °C) (Temporal)   Resp 16   Ht 5' 1\" (1.549 m)   Wt 78.5 kg (173 lb)   SpO2 97%   BMI 32.69 kg/m²        Physical Exam  Vitals and nursing note reviewed.   Constitutional:       General: She is not in acute distress.     Appearance: Normal appearance. She is well-developed. She is not diaphoretic.   HENT:      Head: Normocephalic and atraumatic.      Right Ear: Tympanic membrane and external ear " normal.      Left Ear: Tympanic membrane and external ear normal.      Nose: Congestion present.      Mouth/Throat:      Mouth: Mucous membranes are moist.      Pharynx: No oropharyngeal exudate or posterior oropharyngeal erythema.   Eyes:      General:         Right eye: No discharge.         Left eye: No discharge.      Extraocular Movements: Extraocular movements intact.      Conjunctiva/sclera: Conjunctivae normal.      Pupils: Pupils are equal, round, and reactive to light.   Cardiovascular:      Rate and Rhythm: Normal rate and regular rhythm.   Pulmonary:      Effort: Pulmonary effort is normal. No respiratory distress.      Breath sounds: Normal breath sounds. No wheezing.   Musculoskeletal:         General: Normal range of motion.      Cervical back: Normal range of motion and neck supple. No rigidity.   Lymphadenopathy:      Cervical: No cervical adenopathy.   Skin:     General: Skin is warm and dry.      Capillary Refill: Capillary refill takes less than 2 seconds.      Findings: No rash.   Neurological:      General: No focal deficit present.      Mental Status: She is alert and oriented to person, place, and time.   Psychiatric:         Mood and Affect: Mood normal.         Behavior: Behavior normal.       [facility] [place]

## 2023-12-26 NOTE — LETTER
December 26, 2023     Patient: Ila Oshea   YOB: 1965   Date of Visit: 12/26/2023       To Whom it May Concern:    Ila Oshea was seen in my clinic on 12/26/2023. She may return to work on 1/1/2024 , can return sooner if fever free for 24 hours.     If you have any questions or concerns, please don't hesitate to call.         Sincerely,        DEANDRE Damon   [facility] [place]        CC: No Recipients

## 2023-12-27 LAB
FLUAV RNA RESP QL NAA+PROBE: NEGATIVE
FLUBV RNA RESP QL NAA+PROBE: NEGATIVE
SARS-COV-2 RNA RESP QL NAA+PROBE: NEGATIVE

## 2024-01-30 ENCOUNTER — OFFICE VISIT (OUTPATIENT)
Dept: FAMILY MEDICINE CLINIC | Facility: CLINIC | Age: 59
End: 2024-01-30

## 2024-01-30 VITALS
SYSTOLIC BLOOD PRESSURE: 140 MMHG | HEIGHT: 61 IN | OXYGEN SATURATION: 99 % | RESPIRATION RATE: 18 BRPM | HEART RATE: 71 BPM | DIASTOLIC BLOOD PRESSURE: 92 MMHG | BODY MASS INDEX: 31.72 KG/M2 | TEMPERATURE: 97.7 F | WEIGHT: 168 LBS

## 2024-01-30 DIAGNOSIS — R10.9 CHRONIC ABDOMINAL PAIN: Primary | ICD-10-CM

## 2024-01-30 DIAGNOSIS — G89.29 CHRONIC ABDOMINAL PAIN: Primary | ICD-10-CM

## 2024-01-30 DIAGNOSIS — K21.9 GASTROESOPHAGEAL REFLUX DISEASE, UNSPECIFIED WHETHER ESOPHAGITIS PRESENT: ICD-10-CM

## 2024-01-30 PROCEDURE — 99214 OFFICE O/P EST MOD 30 MIN: CPT | Performed by: FAMILY MEDICINE

## 2024-01-30 RX ORDER — OMEPRAZOLE 20 MG/1
20 CAPSULE, DELAYED RELEASE ORAL DAILY
Qty: 30 CAPSULE | Refills: 1 | Status: SHIPPED | OUTPATIENT
Start: 2024-01-30

## 2024-01-30 RX ORDER — SUCRALFATE 1 G/1
1 TABLET ORAL 4 TIMES DAILY
Qty: 30 TABLET | Refills: 0 | Status: SHIPPED | OUTPATIENT
Start: 2024-01-30

## 2024-01-30 RX ORDER — DICYCLOMINE HCL 20 MG
20 TABLET ORAL EVERY 6 HOURS
Qty: 20 TABLET | Refills: 0 | Status: SHIPPED | OUTPATIENT
Start: 2024-01-30

## 2024-01-30 NOTE — LETTER
January 30, 2024     Patient: Ial Oshea   YOB: 1965   Date of Visit: 1/30/2024       To Whom it May Concern:    Ila Oshea was seen in my clinic on 1/30/2024. She may return to work on 2/1/2024.    If you have any questions or concerns, please don't hesitate to call.         Sincerely,          Marva Beaulieu MD        CC: No Recipients

## 2024-01-30 NOTE — ASSESSMENT & PLAN NOTE
Acute on chronic abdominal pain  Soft foods  Increase water intake  Avoid caffeinated drinks    Start Omeprazole  Start Bentyl  Start Sucralfate    Refer to GI

## 2024-01-30 NOTE — PROGRESS NOTES
Name: Ila Oshea      : 1965      MRN: [id number]  Encounter Provider: Marva Beaulieu MD  Encounter Date: 2024   Encounter department: [facility] [place]    Assessment & Plan     1. Chronic abdominal pain  Assessment & Plan:  Acute on chronic abdominal pain  Soft foods  Increase water intake  Avoid caffeinated drinks    Start Omeprazole  Start Bentyl  Start Sucralfate    Refer to GI      Orders:  -     Ambulatory Referral to Gastroenterology; Future  -     dicyclomine (BENTYL) 20 mg tablet; Take 1 tablet (20 mg total) by mouth every 6 (six) hours    2. Gastroesophageal reflux disease, unspecified whether esophagitis present  -     omeprazole (PriLOSEC) 20 mg delayed release capsule; Take 1 capsule (20 mg total) by mouth daily  -     sucralfate (CARAFATE) 1 g tablet; Take 1 tablet (1 g total) by mouth 4 (four) times a day           Subjective     Ila Oshea is a 58 y.o. female  who presented to the office today with c/o chronic abdominal pain  which she has had for more than 20 years, now  worse for the last 5 to 6 months.  Patient had an episode yesterday of abdominal pain associated with nausea, vomiting, she had hard stools followed by loose stools, and felt chills and clammy.  Since yesterday patient continues to have loose stools and is still having a burning achy pain in her abdomen.  Patient has taken omeprazole in the past with relief of her symptoms but was discontinued and started on famotidine which as of now is not helping relieve her symptoms.  Patient has taken Tums also without any relief.  She denies any blood in her stools.  She thinks she may has IBS, has a stressful occupation.     Abdominal Pain  This is a chronic problem. The current episode started more than 1 year ago. The onset quality is gradual. The problem occurs 2 to 4 times per day. The problem has been gradually worsening. The pain is located in the epigastric  "region and generalized abdominal region. The quality of the pain is aching and burning. The abdominal pain radiates to the epigastric region, LUQ and RUQ. Associated symptoms include constipation and diarrhea. Pertinent negatives include no dysuria, fever, frequency or headaches. The pain is relieved by Bowel movements, recumbency and vomiting. Her past medical history is significant for GERD.     The following portions of the patient's history were reviewed and updated as appropriate: allergies, current medications, past family history, past medical history, past social history, past surgical history and problem list.    Review of Systems   Constitutional:  Negative for chills and fever.   HENT:  Negative for congestion and sore throat.    Gastrointestinal:  Positive for abdominal pain, constipation and diarrhea.   Genitourinary:  Negative for dysuria and frequency.   Skin:  Negative for rash.   Neurological:  Negative for dizziness and headaches.       Current Outpatient Medications on File Prior to Visit   Medication Sig   • albuterol (Ventolin HFA) 90 mcg/act inhaler Inhale 2 puffs every 6 (six) hours as needed for wheezing or shortness of breath   • famotidine (PEPCID) 20 mg tablet Take 1 tablet (20 mg total) by mouth daily   • fluticasone (FLONASE) 50 mcg/act nasal spray 1 spray into each nostril daily       Objective     /92 (BP Location: Right arm, Patient Position: Sitting, Cuff Size: Standard)   Pulse 71   Temp 97.7 °F (36.5 °C) (Temporal)   Resp 18   Ht 5' 1\" (1.549 m)   Wt 76.2 kg (168 lb)   SpO2 99%   BMI 31.74 kg/m²     Physical Exam  Vitals and nursing note reviewed.   Constitutional:       Appearance: She is well-developed.   HENT:      Head: Normocephalic and atraumatic.   Cardiovascular:      Rate and Rhythm: Normal rate.   Pulmonary:      Effort: Pulmonary effort is normal. No respiratory distress.   Abdominal:      General: Bowel sounds are normal.      Palpations: Abdomen is soft. "      Tenderness: There is generalized abdominal tenderness.          Comments: + tenderness mostly RUQ, but also epigastric and LUQ  No guarding  BS normal   Musculoskeletal:      Right lower leg: No edema.      Left lower leg: No edema.   Neurological:      Mental Status: She is alert and oriented to person, place, and time.   Psychiatric:         Mood and Affect: Mood normal.         Behavior: Behavior is cooperative.       Marva Beaulieu MD

## 2024-03-06 ENCOUNTER — TELEPHONE (OUTPATIENT)
Dept: FAMILY MEDICINE CLINIC | Facility: CLINIC | Age: 59
End: 2024-03-06

## 2024-03-06 NOTE — TELEPHONE ENCOUNTER
Patient was contacted on 3/6/24 in regards to canceled appointment for 3/8/24. Patient will call back when ready to reschedule.

## 2024-03-08 ENCOUNTER — APPOINTMENT (OUTPATIENT)
Dept: LAB | Facility: MEDICAL CENTER | Age: 59
End: 2024-03-08

## 2024-03-08 ENCOUNTER — APPOINTMENT (OUTPATIENT)
Dept: URGENT CARE | Facility: MEDICAL CENTER | Age: 59
End: 2024-03-08

## 2024-03-08 DIAGNOSIS — Z02.1 ENCOUNTER FOR PRE-EMPLOYMENT HEALTH SCREENING EXAMINATION: ICD-10-CM

## 2024-03-08 DIAGNOSIS — Z02.1 ENCOUNTER FOR PRE-EMPLOYMENT HEALTH SCREENING EXAMINATION: Primary | ICD-10-CM

## 2024-03-08 LAB
HBV SURFACE AB SER-ACNC: <3 MIU/ML
MEV IGG SER QL IA: NORMAL
MUV IGG SER QL IA: NORMAL
RUBV IGG SERPL IA-ACNC: 65.3 IU/ML
VZV IGG SER QL IA: NORMAL

## 2024-03-08 PROCEDURE — 86762 RUBELLA ANTIBODY: CPT

## 2024-03-08 PROCEDURE — 86706 HEP B SURFACE ANTIBODY: CPT

## 2024-03-08 PROCEDURE — 86480 TB TEST CELL IMMUN MEASURE: CPT

## 2024-03-08 PROCEDURE — 86735 MUMPS ANTIBODY: CPT

## 2024-03-08 PROCEDURE — 86787 VARICELLA-ZOSTER ANTIBODY: CPT

## 2024-03-08 PROCEDURE — 86765 RUBEOLA ANTIBODY: CPT

## 2024-03-08 PROCEDURE — 36415 COLL VENOUS BLD VENIPUNCTURE: CPT

## 2024-03-09 LAB
GAMMA INTERFERON BACKGROUND BLD IA-ACNC: 0.13 IU/ML
M TB IFN-G BLD-IMP: NEGATIVE
M TB IFN-G CD4+ BCKGRND COR BLD-ACNC: -0.05 IU/ML
M TB IFN-G CD4+ BCKGRND COR BLD-ACNC: -0.06 IU/ML
MITOGEN IGNF BCKGRD COR BLD-ACNC: 9.87 IU/ML

## 2024-03-16 ENCOUNTER — HOSPITAL ENCOUNTER (OUTPATIENT)
Dept: MAMMOGRAPHY | Facility: CLINIC | Age: 59
Discharge: HOME | End: 2024-03-16
Payer: COMMERCIAL

## 2024-03-16 VITALS — WEIGHT: 168 LBS | BODY MASS INDEX: 31.72 KG/M2 | HEIGHT: 61 IN

## 2024-03-16 DIAGNOSIS — Z12.31 ENCOUNTER FOR SCREENING MAMMOGRAM FOR MALIGNANT NEOPLASM OF BREAST: ICD-10-CM

## 2024-03-16 PROCEDURE — 77067 SCR MAMMO BI INCL CAD: CPT

## 2024-03-16 PROCEDURE — 77063 BREAST TOMOSYNTHESIS BI: CPT

## 2024-03-25 ENCOUNTER — TELEPHONE (OUTPATIENT)
Dept: GASTROENTEROLOGY | Facility: MEDICAL CENTER | Age: 59
End: 2024-03-25

## 2024-03-25 NOTE — TELEPHONE ENCOUNTER
Called patient to notify of cancelled appointment due to provider schedule change; requested call back to reschedule at [phone]

## 2024-03-27 ENCOUNTER — NURSE TRIAGE (OUTPATIENT)
Age: 59
End: 2024-03-27

## 2024-03-27 NOTE — TELEPHONE ENCOUNTER
Pt. Had a new patient appointment that got cancelled, pt. Had to re-schedule and was unable to  get in with Dr. Perales until 5/28/24, pcp placed her on omeprazole to hold her over until f/u with GI but won't re-fill medication because she is following up with GI, pt. Is asking for re-fill of medication to hold her over until appointment, last seen in office 2/13/2020, pt. C/o GERD pain, pt. Asking for omeprazole to hold her over until her new patient appt on 5/28/24       Reason for Disposition   The patient is on PPI once a day with continued epigastric discomfort, reflux, regurgitation    Answer Questions - Initial Assesment - Gerd Recurrent  When did your symptoms start: ongoing     How often are you experiencing symptoms: everyday     When are your symptoms occurring: everyday     Are you currently taking: Gerd meds: PPI: yes    Have you tried any OTC medications: tums    Have any OTC medications resolved or lessened your symptoms: yes    What recent testing has the patient had: n/a    Are you following the diet and lifestyle modifications: yes    Any recent changes in your bowel habits: no    Protocols used: GERD

## 2024-03-27 NOTE — TELEPHONE ENCOUNTER
Pt called to reschedule, she is not very happy since she has already waited two months and she is having active pain. She is asking if we could extend her omeprazole since her PCP cannot. Transferred to Nurse Jiménez to assist

## 2024-03-28 DIAGNOSIS — K21.9 GASTROESOPHAGEAL REFLUX DISEASE, UNSPECIFIED WHETHER ESOPHAGITIS PRESENT: ICD-10-CM

## 2024-03-28 RX ORDER — OMEPRAZOLE 20 MG/1
20 CAPSULE, DELAYED RELEASE ORAL DAILY
Qty: 90 CAPSULE | Refills: 0 | Status: SHIPPED | OUTPATIENT
Start: 2024-03-28

## 2024-03-30 ENCOUNTER — APPOINTMENT (OUTPATIENT)
Dept: LAB | Facility: HOSPITAL | Age: 59
End: 2024-03-30
Payer: COMMERCIAL

## 2024-03-30 DIAGNOSIS — R42 DIZZINESS: ICD-10-CM

## 2024-03-30 DIAGNOSIS — R73.03 PREDIABETES: ICD-10-CM

## 2024-03-30 LAB
ALBUMIN SERPL BCP-MCNC: 4.5 G/DL (ref 3.5–5)
ALP SERPL-CCNC: 87 U/L (ref 34–104)
ALT SERPL W P-5'-P-CCNC: 13 U/L (ref 7–52)
ANION GAP SERPL CALCULATED.3IONS-SCNC: 7 MMOL/L (ref 4–13)
AST SERPL W P-5'-P-CCNC: 17 U/L (ref 13–39)
BASOPHILS # BLD AUTO: 0.03 THOUSANDS/ÂΜL (ref 0–0.1)
BASOPHILS NFR BLD AUTO: 1 % (ref 0–1)
BILIRUB SERPL-MCNC: 0.32 MG/DL (ref 0.2–1)
BUN SERPL-MCNC: 16 MG/DL (ref 5–25)
CALCIUM SERPL-MCNC: 9.2 MG/DL (ref 8.4–10.2)
CHLORIDE SERPL-SCNC: 105 MMOL/L (ref 96–108)
CHOLEST SERPL-MCNC: 174 MG/DL
CO2 SERPL-SCNC: 28 MMOL/L (ref 21–32)
CREAT SERPL-MCNC: 0.87 MG/DL (ref 0.6–1.3)
EOSINOPHIL # BLD AUTO: 0.08 THOUSAND/ÂΜL (ref 0–0.61)
EOSINOPHIL NFR BLD AUTO: 2 % (ref 0–6)
ERYTHROCYTE [DISTWIDTH] IN BLOOD BY AUTOMATED COUNT: 14.7 % (ref 11.6–15.1)
EST. AVERAGE GLUCOSE BLD GHB EST-MCNC: 128 MG/DL
GFR SERPL CREATININE-BSD FRML MDRD: 74 ML/MIN/1.73SQ M
GLUCOSE P FAST SERPL-MCNC: 102 MG/DL (ref 65–99)
HBA1C MFR BLD: 6.1 %
HCT VFR BLD AUTO: 38.7 % (ref 34.8–46.1)
HDLC SERPL-MCNC: 53 MG/DL
HGB BLD-MCNC: 11.6 G/DL (ref 11.5–15.4)
IMM GRANULOCYTES # BLD AUTO: 0.01 THOUSAND/UL (ref 0–0.2)
IMM GRANULOCYTES NFR BLD AUTO: 0 % (ref 0–2)
LDLC SERPL CALC-MCNC: 106 MG/DL (ref 0–100)
LYMPHOCYTES # BLD AUTO: 2.18 THOUSANDS/ÂΜL (ref 0.6–4.47)
LYMPHOCYTES NFR BLD AUTO: 40 % (ref 14–44)
MAGNESIUM SERPL-MCNC: 2.1 MG/DL (ref 1.9–2.7)
MCH RBC QN AUTO: 25.2 PG (ref 26.8–34.3)
MCHC RBC AUTO-ENTMCNC: 30 G/DL (ref 31.4–37.4)
MCV RBC AUTO: 84 FL (ref 82–98)
MONOCYTES # BLD AUTO: 0.38 THOUSAND/ÂΜL (ref 0.17–1.22)
MONOCYTES NFR BLD AUTO: 7 % (ref 4–12)
NEUTROPHILS # BLD AUTO: 2.72 THOUSANDS/ÂΜL (ref 1.85–7.62)
NEUTS SEG NFR BLD AUTO: 50 % (ref 43–75)
NONHDLC SERPL-MCNC: 121 MG/DL
NRBC BLD AUTO-RTO: 0 /100 WBCS
PLATELET # BLD AUTO: 309 THOUSANDS/UL (ref 149–390)
PMV BLD AUTO: 10.9 FL (ref 8.9–12.7)
POTASSIUM SERPL-SCNC: 3.9 MMOL/L (ref 3.5–5.3)
PROT SERPL-MCNC: 7.4 G/DL (ref 6.4–8.4)
RBC # BLD AUTO: 4.61 MILLION/UL (ref 3.81–5.12)
SODIUM SERPL-SCNC: 140 MMOL/L (ref 135–147)
TRIGL SERPL-MCNC: 74 MG/DL
WBC # BLD AUTO: 5.4 THOUSAND/UL (ref 4.31–10.16)

## 2024-03-30 PROCEDURE — 83735 ASSAY OF MAGNESIUM: CPT

## 2024-03-30 PROCEDURE — 83036 HEMOGLOBIN GLYCOSYLATED A1C: CPT

## 2024-03-30 PROCEDURE — 85025 COMPLETE CBC W/AUTO DIFF WBC: CPT

## 2024-03-30 PROCEDURE — 80053 COMPREHEN METABOLIC PANEL: CPT

## 2024-03-30 PROCEDURE — 80061 LIPID PANEL: CPT

## 2024-03-30 PROCEDURE — 36415 COLL VENOUS BLD VENIPUNCTURE: CPT

## 2024-04-01 DIAGNOSIS — R42 DIZZINESS: Primary | ICD-10-CM

## 2024-04-11 ENCOUNTER — OFFICE VISIT (OUTPATIENT)
Dept: FAMILY MEDICINE CLINIC | Facility: CLINIC | Age: 59
End: 2024-04-11

## 2024-04-11 VITALS
SYSTOLIC BLOOD PRESSURE: 152 MMHG | TEMPERATURE: 98.2 F | BODY MASS INDEX: 32.8 KG/M2 | RESPIRATION RATE: 18 BRPM | HEART RATE: 69 BPM | OXYGEN SATURATION: 99 % | HEIGHT: 61 IN | DIASTOLIC BLOOD PRESSURE: 84 MMHG | WEIGHT: 173.7 LBS

## 2024-04-11 DIAGNOSIS — R73.03 PREDIABETES: Primary | ICD-10-CM

## 2024-04-11 PROCEDURE — 99213 OFFICE O/P EST LOW 20 MIN: CPT | Performed by: FAMILY MEDICINE

## 2024-04-11 NOTE — PROGRESS NOTES
Name: Ila Oshea      : 1965      MRN: [id number]  Encounter Provider: Branden Patel MD  Encounter Date: 2024   Encounter department: [facility] [place]    Assessment & Plan     1. Prediabetes  Assessment & Plan:  Most recent hemoglobin A1c of 6.1  Recommend low carbohydrate diet.  Recommend regular exercise 30 minutes 5 days a week  We will reassess hemoglobin A1c and consider initiating pharmacotherapy if elevated    Orders:  -     Hemoglobin A1C; Future           Subjective      58-year-old female with a history of prediabetes and GERD who presents today for follow-up.  She is doing okay overall.  She like to review her blood work today.  She has a history of prediabetes.  She has difficulties reducing her intake of carbohydrates.  She likes to drink a lot of soda.      Review of Systems   Constitutional:  Negative for chills, diaphoresis, fatigue and fever.   Respiratory:  Negative for shortness of breath.    Cardiovascular:  Negative for chest pain and palpitations.   Gastrointestinal:  Negative for abdominal pain, nausea and vomiting.   Musculoskeletal:  Negative for back pain.   Neurological:  Negative for seizures and headaches.       Current Outpatient Medications on File Prior to Visit   Medication Sig    albuterol (Ventolin HFA) 90 mcg/act inhaler Inhale 2 puffs every 6 (six) hours as needed for wheezing or shortness of breath    dicyclomine (BENTYL) 20 mg tablet Take 1 tablet (20 mg total) by mouth every 6 (six) hours    famotidine (PEPCID) 20 mg tablet Take 1 tablet (20 mg total) by mouth daily    fluticasone (FLONASE) 50 mcg/act nasal spray 1 spray into each nostril daily    omeprazole (PriLOSEC) 20 mg delayed release capsule Take 1 capsule (20 mg total) by mouth daily    sucralfate (CARAFATE) 1 g tablet Take 1 tablet (1 g total) by mouth 4 (four) times a day       Objective     /84 (BP Location: Left arm, Patient Position:  "Sitting, Cuff Size: Standard)   Pulse 69   Temp 98.2 °F (36.8 °C) (Temporal)   Resp 18   Ht 5' 1\" (1.549 m)   Wt 78.8 kg (173 lb 11.2 oz)   SpO2 99%   Breastfeeding Unknown   BMI 32.82 kg/m²     Physical Exam  Vitals reviewed.   Constitutional:       General: She is not in acute distress.     Appearance: Normal appearance. She is well-developed. She is not ill-appearing, toxic-appearing or diaphoretic.   HENT:      Head: Normocephalic and atraumatic.      Right Ear: External ear normal.      Left Ear: External ear normal.      Nose: Nose normal.      Mouth/Throat:      Mouth: Mucous membranes are moist.      Pharynx: No oropharyngeal exudate or posterior oropharyngeal erythema.   Eyes:      General: No scleral icterus.        Right eye: No discharge.         Left eye: No discharge.      Extraocular Movements: Extraocular movements intact.      Conjunctiva/sclera: Conjunctivae normal.   Neck:      Thyroid: No thyromegaly.      Vascular: No JVD.      Trachea: No tracheal deviation.   Cardiovascular:      Rate and Rhythm: Normal rate and regular rhythm.      Heart sounds: Normal heart sounds. No murmur heard.     No friction rub. No gallop.   Pulmonary:      Effort: Pulmonary effort is normal. No respiratory distress.      Breath sounds: Normal breath sounds. No stridor. No wheezing.   Abdominal:      General: Bowel sounds are normal. There is no distension.      Palpations: Abdomen is soft. There is no mass.      Tenderness: There is no abdominal tenderness. There is no guarding.   Musculoskeletal:         General: Normal range of motion.      Cervical back: Normal range of motion.   Feet:      Left foot:      Skin integrity: No callus.   Skin:     General: Skin is warm.      Capillary Refill: Capillary refill takes less than 2 seconds.      Findings: No lesion or rash.   Neurological:      General: No focal deficit present.      Mental Status: She is alert and oriented to person, place, and time.      Motor: " No abnormal muscle tone.      Gait: Gait normal.   Psychiatric:         Mood and Affect: Mood normal.         Behavior: Behavior normal.       Branden Patel MD

## 2024-05-28 ENCOUNTER — LAB (OUTPATIENT)
Dept: LAB | Facility: MEDICAL CENTER | Age: 59
End: 2024-05-28
Payer: COMMERCIAL

## 2024-05-28 ENCOUNTER — APPOINTMENT (OUTPATIENT)
Dept: RADIOLOGY | Facility: MEDICAL CENTER | Age: 59
End: 2024-05-28
Payer: COMMERCIAL

## 2024-05-28 ENCOUNTER — OFFICE VISIT (OUTPATIENT)
Dept: GASTROENTEROLOGY | Facility: MEDICAL CENTER | Age: 59
End: 2024-05-28
Payer: COMMERCIAL

## 2024-05-28 VITALS
DIASTOLIC BLOOD PRESSURE: 70 MMHG | TEMPERATURE: 97.9 F | WEIGHT: 171.6 LBS | BODY MASS INDEX: 32.4 KG/M2 | HEIGHT: 61 IN | OXYGEN SATURATION: 99 % | SYSTOLIC BLOOD PRESSURE: 148 MMHG | HEART RATE: 70 BPM

## 2024-05-28 DIAGNOSIS — K58.2 IRRITABLE BOWEL SYNDROME WITH ALTERNATING BOWEL HABITS: ICD-10-CM

## 2024-05-28 DIAGNOSIS — K21.9 GASTROESOPHAGEAL REFLUX DISEASE, UNSPECIFIED WHETHER ESOPHAGITIS PRESENT: ICD-10-CM

## 2024-05-28 DIAGNOSIS — K58.2 IRRITABLE BOWEL SYNDROME WITH ALTERNATING BOWEL HABITS: Primary | ICD-10-CM

## 2024-05-28 DIAGNOSIS — R10.30 LOWER ABDOMINAL PAIN: ICD-10-CM

## 2024-05-28 DIAGNOSIS — R14.0 BLOATING: ICD-10-CM

## 2024-05-28 DIAGNOSIS — K21.9 GASTROESOPHAGEAL REFLUX DISEASE WITHOUT ESOPHAGITIS: ICD-10-CM

## 2024-05-28 LAB — IGA SERPL-MCNC: 294 MG/DL (ref 66–433)

## 2024-05-28 PROCEDURE — 99204 OFFICE O/P NEW MOD 45 MIN: CPT | Performed by: INTERNAL MEDICINE

## 2024-05-28 PROCEDURE — 86364 TISS TRNSGLTMNASE EA IG CLAS: CPT

## 2024-05-28 PROCEDURE — 74018 RADEX ABDOMEN 1 VIEW: CPT

## 2024-05-28 PROCEDURE — 36415 COLL VENOUS BLD VENIPUNCTURE: CPT

## 2024-05-28 PROCEDURE — 82784 ASSAY IGA/IGD/IGG/IGM EACH: CPT

## 2024-05-28 RX ORDER — OMEPRAZOLE 20 MG/1
20 CAPSULE, DELAYED RELEASE ORAL DAILY
Qty: 90 CAPSULE | Refills: 3 | Status: SHIPPED | OUTPATIENT
Start: 2024-05-28

## 2024-05-28 RX ORDER — TRIAMCINOLONE ACETONIDE 1 MG/G
OINTMENT TOPICAL
COMMUNITY
Start: 2024-04-30

## 2024-05-28 RX ORDER — FAMOTIDINE 20 MG/1
20 TABLET, FILM COATED ORAL DAILY
Qty: 90 TABLET | Refills: 3 | Status: SHIPPED | OUTPATIENT
Start: 2024-05-28

## 2024-05-28 NOTE — PROGRESS NOTES
[facility] - Outpatient Consultation  [facility] 58 [facility] MRN: [id number]  Encounter: [id number]          ASSESSMENT AND PLAN:  58-year-old female with history of asthma, GERD who presents for evaluation.    1. Irritable bowel syndrome with alternating bowel habits  2. Lower abdominal pain  3. Bloating  4. Gastroesophageal reflux disease without esophagitis  She reports general symptoms of bloating, alternating bowel habits with constipation and diarrhea.  She underwent endoscopy and colonoscopy in 2020 which were endoscopically and pathologically normal.  I have ordered fecal calprotectin, celiac testing and Giardia antigen to complete her workup for her intermittent loose stools, however with her description of bowel movements that may be related to overflow diarrhea.  KUB is ordered today for evaluation of her stool burden.  I discussed general constipation management including high-fiber diet, fiber supplementation with goal 25 g/day and adequate hydration.  If she has no improvement of her bowel habits in the next 2 weeks and an x-ray shows a large stool burden consider adding MiraLAX and titrating as needed.  She has no indication for repeat endoscopic evaluation at this time.  She also reports symptoms of intermittent gastroesophageal reflux.  She should continue omeprazole 20 mg daily and Pepcid as needed.  If she has persistent GERD symptoms she can increase omeprazole to 40 mg daily.  - XR abdomen 1 view kub; Future  - Tissue transglutaminase, IgA; Future  - IgA; Future  - psyllium (METAMUCIL) 58.6 % powder; Take 1 packet by mouth daily  Dispense: 30 packet; Refill: 3  - Calprotectin,Fecal; Future  - Giardia antigen; Future          Follow-up in 3 months  ______________________________________________________________________    HPI: 58-year-old female with history of asthma, GERD who presents for evaluation.    She was last seen in the GI office in February  2020.  At that time she reported chronic symptoms of gastroesophageal reflux for over 20 years.  At that time she was using Pepcid as needed but had used Nexium in the past.  She underwent EGD July 2020 which was overall normal.  Gastric biopsy showed mild chronic inactive gastritis negative for H. pylori.  Her colonoscopy was normal and she was recommended repeat in 10 years.    She reports symptoms with bloating and alternating bowel habits of diarrhea and constipation.  There are no specific triggers to her symptoms.  She was given Bentyl and Carafate for general GI upset previously which had resolved and she is no longer using those medications.  She made dietary changes as well and stopped drinking carbonated sodas.  She is incorporated a yogurt or probiotic as well which was helping.  She denies rectal bleeding.  She is taking omeprazole 20 mg daily and Pepcid usually as needed.  She reports stress can make her symptoms worse        She has no recent abdominal imaging available for review  3/2024 hemoglobin 11.6, MCV 84, liver enzymes are within normal limits      REVIEW OF SYSTEMS:    CONSTITUTIONAL: Denies any fever, chills, rigors, and weight loss.  HEENT: No earache or tinnitus. Denies hearing loss or visual disturbances.  CARDIOVASCULAR: No chest pain or palpitations.   RESPIRATORY: Denies any cough, hemoptysis, shortness of breath or dyspnea on exertion.  GASTROINTESTINAL: As noted in the History of Present Illness.   GENITOURINARY: No problems with urination. Denies any hematuria or dysuria.  NEUROLOGIC: No dizziness or vertigo, denies headaches.   MUSCULOSKELETAL: Denies any muscle or joint pain.   SKIN: Denies skin rashes or itching.   ENDOCRINE: Denies excessive thirst. Denies intolerance to heat or cold.  PSYCHOSOCIAL: Denies depression or anxiety. Denies any recent memory loss.       Historical Information   Past Medical History:   Diagnosis Date    Asthma     GERD (gastroesophageal reflux disease)  "    Prediabetes      Past Surgical History:   Procedure Laterality Date    NO PAST SURGERIES       Social History   Social History     Substance and Sexual Activity   Alcohol Use Yes    Comment: socially     Social History     Substance and Sexual Activity   Drug Use Never     Social History     Tobacco Use   Smoking Status Never   Smokeless Tobacco Never     Family History   Problem Relation Age of Onset    No Known Problems Mother     No Known Problems Father     No Known Problems Daughter     Dementia Maternal Grandmother         Without behavioral disturbance, unspecified dementia type     Heart failure Maternal Grandmother     Hypertension Maternal Grandmother     Diabetes type II Maternal Grandmother     No Known Problems Paternal Grandfather     Breast cancer Maternal Aunt         58    Breast cancer Maternal Aunt         60s    No Known Problems Maternal Aunt     Colon cancer Neg Hx     Ovarian cancer Neg Hx        Meds/Allergies       Current Outpatient Medications:     albuterol (Ventolin HFA) 90 mcg/act inhaler    dicyclomine (BENTYL) 20 mg tablet    famotidine (PEPCID) 20 mg tablet    fluticasone (FLONASE) 50 mcg/act nasal spray    omeprazole (PriLOSEC) 20 mg delayed release capsule    sucralfate (CARAFATE) 1 g tablet    No Known Allergies        Objective     Blood pressure 148/70, pulse 70, temperature 97.9 °F (36.6 °C), temperature source Tympanic, height 5' 1\" (1.549 m), weight 77.8 kg (171 lb 9.6 oz), SpO2 99%, unknown if currently breastfeeding. There is no height or weight on file to calculate BMI.        PHYSICAL EXAM:      General Appearance:   Alert, cooperative, no distress   HEENT:   Normocephalic, atraumatic, anicteric.     Neck:  Supple, symmetrical, trachea midline   Lungs:   Clear to auscultation bilaterally; no rales, rhonchi or wheezing; respirations unlabored    Heart::   Regular rate and rhythm; no murmur, rub, or gallop.   Abdomen:   Soft, mild lower abdominal tenderness to palpation " without rebound or guarding non-distended; normal bowel sounds; no masses, no organomegaly    Genitalia:   Deferred    Rectal:   Deferred    Extremities:  No cyanosis, clubbing or edema    Pulses:  2+ and symmetric    Skin:  No jaundice, rashes, or lesions    Lymph nodes:  No palpable cervical lymphadenopathy        Lab Results:   No visits with results within 1 Day(s) from this visit.   Latest known visit with results is:   Appointment on 03/30/2024   Component Date Value    Hemoglobin A1C 03/30/2024 6.1 (H)     EAG 03/30/2024 128     Cholesterol 03/30/2024 174     Triglycerides 03/30/2024 74     HDL, Direct 03/30/2024 53     LDL Calculated 03/30/2024 106 (H)     Non-HDL-Chol (CHOL-HDL) 03/30/2024 121     WBC 03/30/2024 5.40     RBC 03/30/2024 4.61     Hemoglobin 03/30/2024 11.6     Hematocrit 03/30/2024 38.7     MCV 03/30/2024 84     MCH 03/30/2024 25.2 (L)     MCHC 03/30/2024 30.0 (L)     RDW 03/30/2024 14.7     MPV 03/30/2024 10.9     Platelets 03/30/2024 309     nRBC 03/30/2024 0     Segmented % 03/30/2024 50     Immature Grans % 03/30/2024 0     Lymphocytes % 03/30/2024 40     Monocytes % 03/30/2024 7     Eosinophils Relative 03/30/2024 2     Basophils Relative 03/30/2024 1     Absolute Neutrophils 03/30/2024 2.72     Absolute Immature Grans 03/30/2024 0.01     Absolute Lymphocytes 03/30/2024 2.18     Absolute Monocytes 03/30/2024 0.38     Eosinophils Absolute 03/30/2024 0.08     Basophils Absolute 03/30/2024 0.03     Sodium 03/30/2024 140     Potassium 03/30/2024 3.9     Chloride 03/30/2024 105     CO2 03/30/2024 28     ANION GAP 03/30/2024 7     BUN 03/30/2024 16     Creatinine 03/30/2024 0.87     Glucose, Fasting 03/30/2024 102 (H)     Calcium 03/30/2024 9.2     AST 03/30/2024 17     ALT 03/30/2024 13     Alkaline Phosphatase 03/30/2024 87     Total Protein 03/30/2024 7.4     Albumin 03/30/2024 4.5     Total Bilirubin 03/30/2024 0.32     eGFR 03/30/2024 74     Magnesium 03/30/2024 2.1          Radiology  Results:   No results found.    This note was completed in part utilizing Dragon Software. Grammatical errors, random word insertions, spelling mistakes, and incomplete sentences may be an occasional consequence of this system secondary to software limitations, ambient noise, and hardware issues. If you have any questions or concerns about the content, text, or information contained within the body of this dictation, please contact the provider for clarification.

## 2024-05-28 NOTE — PATIENT INSTRUCTIONS
Start Benefiber or a fiber supplement daily    High Fiber Diet   AMBULATORY CARE:   A high-fiber diet  includes foods that have a high amount of fiber. Fiber is the part of fruits, vegetables, and grains that is not broken down by your body. Fiber keeps your bowel movements regular. Fiber can also help lower your cholesterol level, control blood sugar in people with diabetes, and relieve constipation. Fiber can also help you control your weight because it helps you feel full faster. Most adults should eat 25 to 35 grams of fiber each day. Talk to your dietitian or healthcare provider about the amount of fiber you need.  Good sources of fiber:       Foods with at least 4 grams of fiber per serving:      ? to ½ cup of high-fiber cereal (check the nutrition label on the box)    ½ cup of blackberries or raspberries    4 dried prunes    1 cooked artichoke    ½ cup of cooked legumes, such as lentils, or red, kidney, and sarmiento beans    Foods with 1 to 3 grams of fiber per servin slice of whole-wheat, pumpernickel, or rye bread    ½ cup of cooked brown rice    4 whole-wheat crackers    1 cup of oatmeal    ½ cup of cereal with 1 to 3 grams of fiber per serving (check the nutrition label on the box)    1 small piece of fruit, such as an apple, banana, pear, kiwi, or orange    3 dates    ½ cup of canned apricots, fruit cocktail, peaches, or pears    ½ cup of raw or cooked vegetables, such as carrots, cauliflower, cabbage, spinach, squash, or corn  Ways that you can increase fiber in your diet:   Choose brown or wild rice instead of white rice.     Use whole wheat flour in recipes instead of white or all-purpose flour.     Add beans and peas to casseroles or soups.     Choose fresh fruit and vegetables with peels or skins on instead of juices.    Other diet guidelines to follow:   Add fiber to your diet slowly.  You may have abdominal discomfort, bloating, and gas if you add fiber to your diet too quickly.     Drink  plenty of liquids as you add fiber to your diet.  You may have nausea or develop constipation if you do not drink enough water. Ask how much liquid to drink each day and which liquids are best for you.    © Copyright [employer] 2023 Information is for End User's use only and may not be sold, redistributed or otherwise used for commercial purposes.  The above information is an  only. It is not intended as medical advice for individual conditions or treatments. Talk to your doctor, nurse or pharmacist before following any medical regimen to see if it is safe and effective for you.

## 2024-05-29 LAB — TTG IGA SER-ACNC: <2 U/ML (ref 0–3)

## 2024-05-29 NOTE — RESULT ENCOUNTER NOTE
Results relayed via [employer]  Mild to moderate stool burden.  Start fiber supplementation and add MiraLAX as needed discussed in the office visit

## 2024-08-06 ENCOUNTER — OFFICE VISIT (OUTPATIENT)
Dept: FAMILY MEDICINE CLINIC | Facility: CLINIC | Age: 59
End: 2024-08-06

## 2024-08-06 VITALS
DIASTOLIC BLOOD PRESSURE: 84 MMHG | TEMPERATURE: 97.7 F | OXYGEN SATURATION: 96 % | HEIGHT: 61 IN | HEART RATE: 80 BPM | RESPIRATION RATE: 14 BRPM | WEIGHT: 170.8 LBS | SYSTOLIC BLOOD PRESSURE: 148 MMHG | BODY MASS INDEX: 32.25 KG/M2

## 2024-08-06 DIAGNOSIS — R53.83 MALAISE AND FATIGUE: ICD-10-CM

## 2024-08-06 DIAGNOSIS — J06.9 UPPER RESPIRATORY TRACT INFECTION, UNSPECIFIED TYPE: Primary | ICD-10-CM

## 2024-08-06 DIAGNOSIS — J45.20 MILD INTERMITTENT ASTHMA WITHOUT COMPLICATION: ICD-10-CM

## 2024-08-06 DIAGNOSIS — R53.81 MALAISE AND FATIGUE: ICD-10-CM

## 2024-08-06 LAB
SARS-COV-2 AG UPPER RESP QL IA: NEGATIVE
VALID CONTROL: NORMAL

## 2024-08-06 PROCEDURE — 99214 OFFICE O/P EST MOD 30 MIN: CPT | Performed by: FAMILY MEDICINE

## 2024-08-06 PROCEDURE — 87811 SARS-COV-2 COVID19 W/OPTIC: CPT | Performed by: FAMILY MEDICINE

## 2024-08-06 RX ORDER — FLUTICASONE PROPIONATE 50 MCG
1 SPRAY, SUSPENSION (ML) NASAL DAILY
Qty: 16 G | Refills: 2 | Status: SHIPPED | OUTPATIENT
Start: 2024-08-06

## 2024-08-06 RX ORDER — ALBUTEROL SULFATE 90 UG/1
2 AEROSOL, METERED RESPIRATORY (INHALATION) EVERY 6 HOURS PRN
Qty: 18 G | Refills: 2 | Status: SHIPPED | OUTPATIENT
Start: 2024-08-06

## 2024-08-06 RX ORDER — PHENYLEPHRINE HCL 10 MG/1
10 TABLET, FILM COATED ORAL EVERY 4 HOURS PRN
Qty: 30 TABLET | Refills: 0 | Status: SHIPPED | OUTPATIENT
Start: 2024-08-06

## 2024-08-06 NOTE — LETTER
August 6, 2024     Patient: Ila Oshea   YOB: 1965   Date of Visit: 8/6/2024       To Whom it May Concern:    Ila Oshea was seen in my clinic on 8/6/2024. She may return to work on 8/8/2024 .    If you have any questions or concerns, please don't hesitate to call.         Sincerely,          Marva Beaulieu MD        CC: No Recipients

## 2024-08-06 NOTE — ASSESSMENT & PLAN NOTE
Unclear etiology for the malleus and fatigue, but most likely a viral URI  POCT COVID test negative  Advised patient to manage conservatively for now  Work note written

## 2024-08-06 NOTE — LETTER
August 6, 2024     Patient: Ila Oshea   YOB: 1965   Date of Visit: 8/6/2024       To Whom it May Concern:    Ila Oshea was seen in my clinic on 8/6/2024. She may return to work on 7/8/2024 .    If you have any questions or concerns, please don't hesitate to call.         Sincerely,          Marva Beaulieu MD        CC: No Recipients

## 2024-08-06 NOTE — PROGRESS NOTES
Ambulatory Visit  Name: Ila Oshea      : 1965      MRN: [id number]  Encounter Provider: Marva Beaulieu MD  Encounter Date: 2024   Encounter department: [facility]    Assessment & Plan   1. Upper respiratory tract infection, unspecified type  Assessment & Plan:  Unclear etiology for the malleus and fatigue, but most likely a viral URI  POCT COVID test negative  Advised patient to manage conservatively for now  Work note written  Orders:  -     dextromethorphan-guaifenesin (MUCINEX DM)  MG per 12 hr tablet; Take 1 tablet by mouth every 12 (twelve) hours  -     phenylephrine (SUDAFED PE) 10 MG TABS; Take 1 tablet (10 mg total) by mouth every 4 (four) hours as needed for congestion  -     fluticasone (FLONASE) 50 mcg/act nasal spray; 1 spray into each nostril daily  2. Malaise and fatigue  -     Poct Covid 19 Rapid Antigen Test  3. Mild intermittent asthma without complication  Assessment & Plan:  History of mild intermittent asthma  ProAir albuterol does not work as well for patient as Ventolin albuterol does  New prescription sent to pharmacy  Orders:  -     albuterol (Ventolin HFA) 90 mcg/act inhaler; Inhale 2 puffs every 6 (six) hours as needed for wheezing or shortness of breath       History of Present Illness     HPI  Ila Oshea is a 59 y.o. female  has a past medical history of Asthma, GERD (gastroesophageal reflux disease), and Prediabetes. who presented to the office today with a 2 days ah/o of generalized malaise.  Pt's symtpoms started with body aches, and mild frontal headache, and tehn she felt a heaviness and mild shortness of breath.    Pt started to use her inhaler with some relief of sx.  She does not respond well to the ProAir Albuterol, and Ventolin Albuterol inhaler works better for her.    Pt works as a patient .  No other sick contacts at home, no new foods/exposures patient cannot  "recall.      The following portions of the patient's history were reviewed and updated as appropriate: allergies, current medications, past family history, past medical history, past social history, past surgical history and problem list.    Review of Systems   Constitutional:  Positive for fatigue.   HENT:  Negative for congestion and sore throat.    Respiratory:  Positive for shortness of breath. Negative for cough.    Cardiovascular:  Negative for chest pain.   Gastrointestinal:  Positive for abdominal pain (chronic). Negative for nausea.   Musculoskeletal:  Positive for myalgias.   Neurological:  Positive for dizziness and headaches.       Objective     /84 (BP Location: Left arm, Patient Position: Sitting, Cuff Size: Standard)   Pulse 80   Temp 97.7 °F (36.5 °C) (Temporal)   Resp 14   Ht 5' 1\" (1.549 m)   Wt 77.5 kg (170 lb 12.8 oz)   SpO2 96%   BMI 32.27 kg/m²     Physical Exam  Vitals and nursing note reviewed.   Constitutional:       General: She is not in acute distress.     Appearance: She is well-developed. She is obese.   HENT:      Head: Normocephalic and atraumatic.      Right Ear: Tympanic membrane, ear canal and external ear normal.      Left Ear: Tympanic membrane, ear canal and external ear normal.      Nose: Congestion present.      Mouth/Throat:      Pharynx: Posterior oropharyngeal erythema present.   Eyes:      Conjunctiva/sclera: Conjunctivae normal.   Cardiovascular:      Rate and Rhythm: Normal rate and regular rhythm.      Heart sounds: No murmur heard.  Pulmonary:      Effort: Pulmonary effort is normal. No respiratory distress.      Breath sounds: Normal breath sounds.   Abdominal:      Palpations: Abdomen is soft.      Tenderness: There is no abdominal tenderness.   Musculoskeletal:         General: No swelling.      Cervical back: Neck supple.      Right lower leg: No edema.      Left lower leg: No edema.   Skin:     General: Skin is warm and dry.      Capillary Refill: " Capillary refill takes less than 2 seconds.   Neurological:      Mental Status: She is alert and oriented to person, place, and time.   Psychiatric:         Mood and Affect: Mood normal.         Behavior: Behavior normal.       Administrative Statements

## 2024-08-06 NOTE — ASSESSMENT & PLAN NOTE
History of mild intermittent asthma  ProAir albuterol does not work as well for patient as Ventolin albuterol does  New prescription sent to pharmacy

## 2024-10-03 ENCOUNTER — ANNUAL EXAM (OUTPATIENT)
Dept: OBGYN CLINIC | Facility: CLINIC | Age: 59
End: 2024-10-03

## 2024-10-03 VITALS
HEART RATE: 70 BPM | DIASTOLIC BLOOD PRESSURE: 79 MMHG | BODY MASS INDEX: 32.99 KG/M2 | WEIGHT: 174.6 LBS | SYSTOLIC BLOOD PRESSURE: 150 MMHG

## 2024-10-03 DIAGNOSIS — Z12.4 SCREENING FOR CERVICAL CANCER: ICD-10-CM

## 2024-10-03 DIAGNOSIS — Z12.39 ENCOUNTER FOR BREAST CANCER SCREENING USING NON-MAMMOGRAM MODALITY: ICD-10-CM

## 2024-10-03 DIAGNOSIS — Z01.419 ENCOUNTER FOR GYNECOLOGICAL EXAMINATION WITHOUT ABNORMAL FINDING: Primary | ICD-10-CM

## 2024-10-03 DIAGNOSIS — Z12.31 ENCOUNTER FOR SCREENING MAMMOGRAM FOR MALIGNANT NEOPLASM OF BREAST: ICD-10-CM

## 2024-10-03 PROCEDURE — 99396 PREV VISIT EST AGE 40-64: CPT | Performed by: NURSE PRACTITIONER

## 2024-10-03 NOTE — PROGRESS NOTES
ANNUAL GYNECOLOGICAL EXAMINATION    Ila Oshea is a 59 y.o. female who presents today for annual GYN exam.  Her last pap smear was performed 2022 and result was NILM with negative HPV.  She reports no history of abnormal pap smears in her past.  Her last mammogram was performed 3/16/2024 and result was WNL.  She had colon cancer screening performed 2020 via colonoscopy which was WNL and repeat was recommended in 10 years.  She had HIV screening performed 2022 and it was negative.  She reports menses as absent since .  Her general medical history has been reviewed and she reports it as follows:    Past Medical History:   Diagnosis Date    Asthma     GERD (gastroesophageal reflux disease)     Hypertension     Prediabetes      Past Surgical History:   Procedure Laterality Date    NO PAST SURGERIES       OB History          3    Para   1    Term   1       0    AB   2    Living   1         SAB   0    IAB   2    Ectopic   0    Multiple   0    Live Births   1               Social History     Tobacco Use    Smoking status: Never    Smokeless tobacco: Never   Vaping Use    Vaping status: Never Used   Substance Use Topics    Alcohol use: Yes     Comment: couple times/month    Drug use: Never     Social History     Substance and Sexual Activity   Sexual Activity Yes    Partners: Male    Birth control/protection: Post-menopausal     Cancer-related family history includes Breast cancer in her maternal aunt and maternal aunt. There is no history of Colon cancer or Ovarian cancer.    Current Outpatient Medications   Medication Instructions    albuterol (Ventolin HFA) 90 mcg/act inhaler 2 puffs, Inhalation, Every 6 hours PRN    dextromethorphan-guaifenesin (MUCINEX DM)  MG per 12 hr tablet 1 tablet, Oral, Every 12 hours    dicyclomine (BENTYL) 20 mg, Oral, Every 6 hours    famotidine (PEPCID) 20 mg, Oral, Daily    fluticasone (FLONASE) 50 mcg/act nasal spray 1 spray, Nasal, Daily     omeprazole (PRILOSEC) 20 mg, Oral, Daily    phenylephrine (SUDAFED PE) 10 mg, Oral, Every 4 hours PRN    psyllium (METAMUCIL) 58.6 % powder 1 packet, Oral, Daily    sucralfate (CARAFATE) 1 g, Oral, 4 times daily    triamcinolone (KENALOG) 0.1 % ointment APPLY TOPICALLY TO THE AFFECTED AREA OF EXTREMITIES TWICE DAILY FOR 2 WEEKS FOR FLARES       Review of Systems:  Review of Systems   Constitutional: Negative.    Gastrointestinal: Negative.    Genitourinary:  Negative for difficulty urinating, pelvic pain, vaginal bleeding and vaginal discharge.   Skin: Negative.        Physical Exam:  /79 (BP Location: Left arm, Patient Position: Sitting, Cuff Size: Standard)   Pulse 70   Wt 79.2 kg (174 lb 9.6 oz)   BMI 32.99 kg/m²   Physical Exam  Constitutional:       General: She is not in acute distress.     Appearance: She is well-developed.   Genitourinary:      Vulva normal.      No lesions in the vagina.      Anterior vaginal prolapse present.     Moderate vaginal atrophy present.       Right Adnexa: not tender and no mass present.     Left Adnexa: not tender and no mass present.     No cervical motion tenderness or lesion.      Uterus is not tender.   Breasts:     Right: No mass, nipple discharge, skin change or tenderness.      Left: No mass, nipple discharge, skin change or tenderness.   Neck:      Thyroid: No thyromegaly.   Cardiovascular:      Rate and Rhythm: Normal rate and regular rhythm.   Pulmonary:      Effort: Pulmonary effort is normal.   Abdominal:      Palpations: Abdomen is soft.      Tenderness: There is no abdominal tenderness.   Musculoskeletal:      Cervical back: Neck supple.   Neurological:      Mental Status: She is alert and oriented to person, place, and time.   Skin:     General: Skin is warm and dry.   Vitals reviewed.       Assessment/Plan:   1. Normal well-woman GYN exam.  2. Cervical cancer screening:  Normal cervical exam.  Pap smear not indicated at this time.  Has not received  HPV vaccine in the past and she is beyond the age of recommended administration.   3. STD screening:  Patient declines.   4. Breast cancer screening:  Normal breast exam.  Order placed for bilateral screening mammogram.  Reviewed breast self-awareness.   5. Colon cancer screening:  Up to date.   6. Depression Screening: Patient's depression screening was assessed with a PHQ-2 score of 0. Clinically patient does not have depression. No treatment is required.   7. BMI Counseling: Body mass index is 32.99 kg/m². Discussed the patient's BMI with her. The BMI is above normal. Nutrition recommendations include reducing portion sizes and decreasing overall calorie intake.   8. Return to office in 1 year for annual GYN exam.    Reviewed with patient that test results are available in [employer] immediately, but that they will not necessarily be reviewed by me immediately.  Explained that I will review results at my earliest opportunity and contact patient appropriately.

## 2024-10-04 ENCOUNTER — TELEPHONE (OUTPATIENT)
Dept: OBGYN CLINIC | Facility: CLINIC | Age: 59
End: 2024-10-04

## 2024-10-04 NOTE — TELEPHONE ENCOUNTER
Left voicemail for patient Mammogram has been scheduled for Saturday 3/22/25 10am at [address] [place] [id number]. Appointment for [facility] were not available as of yet for a Saturday.

## 2024-10-17 ENCOUNTER — OFFICE VISIT (OUTPATIENT)
Dept: FAMILY MEDICINE CLINIC | Facility: CLINIC | Age: 59
End: 2024-10-17

## 2024-10-17 VITALS
HEIGHT: 61 IN | TEMPERATURE: 97.8 F | OXYGEN SATURATION: 99 % | DIASTOLIC BLOOD PRESSURE: 80 MMHG | SYSTOLIC BLOOD PRESSURE: 146 MMHG | BODY MASS INDEX: 32.89 KG/M2 | WEIGHT: 174.2 LBS | HEART RATE: 73 BPM | RESPIRATION RATE: 16 BRPM

## 2024-10-17 DIAGNOSIS — M79.672 LEFT FOOT PAIN: ICD-10-CM

## 2024-10-17 DIAGNOSIS — I10 PRIMARY HYPERTENSION: Primary | ICD-10-CM

## 2024-10-17 DIAGNOSIS — Z23 ENCOUNTER FOR IMMUNIZATION: ICD-10-CM

## 2024-10-17 DIAGNOSIS — R73.03 PREDIABETES: ICD-10-CM

## 2024-10-17 PROCEDURE — 90471 IMMUNIZATION ADMIN: CPT | Performed by: FAMILY MEDICINE

## 2024-10-17 PROCEDURE — 99214 OFFICE O/P EST MOD 30 MIN: CPT | Performed by: FAMILY MEDICINE

## 2024-10-17 PROCEDURE — 90673 RIV3 VACCINE NO PRESERV IM: CPT | Performed by: FAMILY MEDICINE

## 2024-10-17 RX ORDER — AMLODIPINE BESYLATE 5 MG/1
5 TABLET ORAL DAILY
Qty: 30 TABLET | Refills: 2 | Status: SHIPPED | OUTPATIENT
Start: 2024-10-17

## 2024-10-17 NOTE — PROGRESS NOTES
Ambulatory Visit  Name: Ila Oshea      : 1965      MRN: [id number]  Encounter Provider: Branden Patel MD  Encounter Date: 10/17/2024   Encounter department: [facility] [place]    Assessment & Plan  Primary hypertension  Chart review showed multiple elevated clinic blood pressure readings  Patient also reports elevated ambulatory blood pressure readings  She has a family history of hypertension  DASH diet discussed with patient  Start amlodipine  Blood pressure goal less than 140/90  Orders:    amLODIPine (NORVASC) 5 mg tablet; Take 1 tablet (5 mg total) by mouth daily    Prediabetes  Recommend low carbohydrate diet  Recheck HBA1C       Left foot pain    Orders:    XR foot 2 vw left; Future    Encounter for immunization    Orders:    influenza vaccine, recombinant, PF, 0.5 mL IM (Flublok)       History of Present Illness     59-year-old female with a history of prediabetes who presents today for follow-up.  Patient reports pain in her left foot for the past few months.  She denies any injury.  Patient has a family history of hypertension.  Her blood pressure has been high both in the clinic and ambulatory setting.            Review of Systems   Constitutional:  Negative for appetite change, chills, diaphoresis, fatigue, fever and unexpected weight change.   Eyes:  Negative for photophobia and visual disturbance.   Respiratory:  Negative for shortness of breath and wheezing.    Cardiovascular:  Negative for chest pain, palpitations and leg swelling.   Gastrointestinal:  Negative for abdominal pain, nausea and vomiting.   Musculoskeletal:  Positive for arthralgias. Negative for back pain.   Skin:  Negative for rash.   Neurological:  Negative for dizziness, seizures, syncope, light-headedness and headaches.   Psychiatric/Behavioral:  Negative for confusion.            Objective     /80 (BP Location: Left arm, Patient Position: Sitting, Cuff Size:  "Standard) Comment: does not take bp meds  Pulse 73   Temp 97.8 °F (36.6 °C) (Temporal)   Resp 16   Ht 5' 1\" (1.549 m)   Wt 79 kg (174 lb 3.2 oz)   SpO2 99%   BMI 32.91 kg/m²     Physical Exam  Constitutional:       General: She is not in acute distress.     Appearance: Normal appearance. She is well-developed. She is not ill-appearing, toxic-appearing or diaphoretic.   HENT:      Head: Normocephalic and atraumatic.      Right Ear: External ear normal.      Left Ear: External ear normal.      Nose: Nose normal.      Mouth/Throat:      Mouth: Mucous membranes are moist.   Eyes:      General: No scleral icterus.        Right eye: No discharge.         Left eye: No discharge.      Extraocular Movements: Extraocular movements intact.      Pupils: Pupils are equal, round, and reactive to light.   Cardiovascular:      Rate and Rhythm: Normal rate and regular rhythm.      Heart sounds: Normal heart sounds. No murmur heard.     No friction rub. No gallop.   Pulmonary:      Effort: Pulmonary effort is normal. No respiratory distress.      Breath sounds: No stridor. No wheezing.   Abdominal:      General: Bowel sounds are normal. There is no distension.      Palpations: Abdomen is soft. There is no mass.      Tenderness: There is no abdominal tenderness. There is no guarding.      Hernia: No hernia is present.   Musculoskeletal:         General: Normal range of motion.      Cervical back: Normal range of motion.      Right lower leg: No edema.      Left lower leg: No edema.   Skin:     General: Skin is warm.      Capillary Refill: Capillary refill takes less than 2 seconds.   Neurological:      General: No focal deficit present.      Mental Status: She is alert and oriented to person, place, and time.      Cranial Nerves: No cranial nerve deficit.      Motor: No weakness.      Coordination: Coordination normal.      Gait: Gait normal.   Psychiatric:         Mood and Affect: Mood normal.         Behavior: Behavior " normal.

## 2024-10-17 NOTE — ASSESSMENT & PLAN NOTE
Chart review showed multiple elevated clinic blood pressure readings  Patient also reports elevated ambulatory blood pressure readings  She has a family history of hypertension  DASH diet discussed with patient  Start amlodipine  Blood pressure goal less than 140/90  Orders:    amLODIPine (NORVASC) 5 mg tablet; Take 1 tablet (5 mg total) by mouth daily

## 2024-10-22 PROBLEM — J06.9 UPPER RESPIRATORY TRACT INFECTION: Status: RESOLVED | Noted: 2024-08-06 | Resolved: 2024-10-22

## 2024-10-29 ENCOUNTER — APPOINTMENT (OUTPATIENT)
Dept: LAB | Facility: CLINIC | Age: 59
End: 2024-10-29
Payer: COMMERCIAL

## 2024-10-29 DIAGNOSIS — R73.03 PREDIABETES: ICD-10-CM

## 2024-10-29 DIAGNOSIS — R42 DIZZINESS: ICD-10-CM

## 2024-10-29 LAB
EST. AVERAGE GLUCOSE BLD GHB EST-MCNC: 134 MG/DL
FERRITIN SERPL-MCNC: 53 NG/ML (ref 11–307)
FOLATE SERPL-MCNC: 16.6 NG/ML
HBA1C MFR BLD: 6.3 %
IRON SATN MFR SERPL: 12 % (ref 15–50)
IRON SERPL-MCNC: 47 UG/DL (ref 50–212)
TIBC SERPL-MCNC: 393 UG/DL (ref 250–450)
UIBC SERPL-MCNC: 346 UG/DL (ref 155–355)
VIT B12 SERPL-MCNC: 176 PG/ML (ref 180–914)

## 2024-10-29 PROCEDURE — 82746 ASSAY OF FOLIC ACID SERUM: CPT

## 2024-10-29 PROCEDURE — 83036 HEMOGLOBIN GLYCOSYLATED A1C: CPT

## 2024-10-29 PROCEDURE — 83550 IRON BINDING TEST: CPT

## 2024-10-29 PROCEDURE — 83540 ASSAY OF IRON: CPT

## 2024-10-29 PROCEDURE — 82728 ASSAY OF FERRITIN: CPT

## 2024-10-29 PROCEDURE — 36415 COLL VENOUS BLD VENIPUNCTURE: CPT

## 2024-10-29 PROCEDURE — 82607 VITAMIN B-12: CPT

## 2024-11-02 ENCOUNTER — LAB (OUTPATIENT)
Dept: LAB | Facility: MEDICAL CENTER | Age: 59
End: 2024-11-02
Payer: COMMERCIAL

## 2024-11-02 ENCOUNTER — APPOINTMENT (OUTPATIENT)
Dept: RADIOLOGY | Facility: MEDICAL CENTER | Age: 59
End: 2024-11-02
Payer: COMMERCIAL

## 2024-11-02 DIAGNOSIS — K58.2 IRRITABLE BOWEL SYNDROME WITH ALTERNATING BOWEL HABITS: ICD-10-CM

## 2024-11-02 DIAGNOSIS — M79.672 LEFT FOOT PAIN: ICD-10-CM

## 2024-11-02 PROCEDURE — 87329 GIARDIA AG IA: CPT

## 2024-11-02 PROCEDURE — 73630 X-RAY EXAM OF FOOT: CPT

## 2024-11-02 PROCEDURE — 83993 ASSAY FOR CALPROTECTIN FECAL: CPT

## 2024-11-03 LAB — CALPROTECTIN STL-MCNC: 78.8 ΜG/G

## 2024-11-04 LAB — G LAMBLIA AG STL QL IA: NEGATIVE

## 2024-11-04 NOTE — RESULT ENCOUNTER NOTE
Results relayed via [employer]  Borderline elevated fecal calprotectin.  Repeat testing in 4 to 6 weeks or consider sooner colonoscopy given change in bowel habits.  Patient is scheduled for an office visit in 2 weeks to review the results

## 2024-11-08 DIAGNOSIS — R42 DIZZINESS: Primary | ICD-10-CM

## 2024-11-08 DIAGNOSIS — E53.8 B12 DEFICIENCY: ICD-10-CM

## 2024-11-12 ENCOUNTER — OFFICE VISIT (OUTPATIENT)
Dept: GASTROENTEROLOGY | Facility: MEDICAL CENTER | Age: 59
End: 2024-11-12
Payer: COMMERCIAL

## 2024-11-12 VITALS
DIASTOLIC BLOOD PRESSURE: 68 MMHG | TEMPERATURE: 98.2 F | WEIGHT: 176.6 LBS | BODY MASS INDEX: 33.34 KG/M2 | HEIGHT: 61 IN | HEART RATE: 71 BPM | SYSTOLIC BLOOD PRESSURE: 138 MMHG

## 2024-11-12 DIAGNOSIS — K58.2 IRRITABLE BOWEL SYNDROME WITH ALTERNATING BOWEL HABITS: Primary | ICD-10-CM

## 2024-11-12 DIAGNOSIS — K21.9 GASTROESOPHAGEAL REFLUX DISEASE WITHOUT ESOPHAGITIS: ICD-10-CM

## 2024-11-12 DIAGNOSIS — E61.1 IRON DEFICIENCY: ICD-10-CM

## 2024-11-12 PROCEDURE — 99214 OFFICE O/P EST MOD 30 MIN: CPT | Performed by: INTERNAL MEDICINE

## 2024-11-12 RX ORDER — FERROUS SULFATE 324(65)MG
324 TABLET, DELAYED RELEASE (ENTERIC COATED) ORAL
Qty: 30 TABLET | Refills: 3 | Status: SHIPPED | OUTPATIENT
Start: 2024-11-12

## 2024-11-12 RX ORDER — SODIUM CHLORIDE, SODIUM LACTATE, POTASSIUM CHLORIDE, CALCIUM CHLORIDE 600; 310; 30; 20 MG/100ML; MG/100ML; MG/100ML; MG/100ML
125 INJECTION, SOLUTION INTRAVENOUS CONTINUOUS
OUTPATIENT
Start: 2024-11-12

## 2024-11-12 NOTE — ASSESSMENT & PLAN NOTE
She will continue omeprazole 20 mg daily alternating with Pepcid for her history of reflux symptoms in addition to dietary/lifestyle antireflux measures

## 2024-11-12 NOTE — PROGRESS NOTES
Ambulatory Visit  Name: Ila Oshea      : 1965      MRN: [id number]  Encounter Provider: Diana M Jaiyeola, MD  Encounter Date: 2024   Encounter department: [facility] [place]    Assessment & Plan  Iron deficiency  She has a history of alternating bowel habits and underwent stool testing after her last office visit showing a borderline elevated fecal calprotectin.  Her recent blood work ordered by her PCP shows iron deficiency.  I ordered CBC for evaluation of her hemoglobin level and recommend that she start oral iron supplementation once daily.  If she has worsening constipation episodes with the oral iron she may require hematology referral for IV iron.  I recommend she continue high-fiber diet and fiber supplementation to aid in stool bulking.  Given her abnormal fecal calprotectin, alternating bowel habits and iron deficiency she will be scheduled for diagnostic EGD and colonoscopy to obtain gastric, duodenal biopsies, terminal ileal intubation and random colon biopsies.  Orders:    CBC and differential; Future    ferrous sulfate 324 (65 Fe) mg; Take 1 tablet (324 mg total) by mouth daily before breakfast    EGD; Future    Irritable bowel syndrome with alternating bowel habits    Orders:    Colonoscopy; Future    Gastroesophageal reflux disease without esophagitis  She will continue omeprazole 20 mg daily alternating with Pepcid for her history of reflux symptoms in addition to dietary/lifestyle antireflux measures         History of Present Illness     Ila Oshea is a 59 y.o. female who presents for follow up evaluation.    She was previously seen in the GI office in May 2024.  At that time she had general symptoms of bloating alternating bowel habits and constipation.  She had previously undergone endoscopy and colonoscopy in  which were endoscopically and pathologically normal.  At her last office visit she was ordered for  "abdominal imaging with KUB, celiac testing and stool testing and recommended to start fiber supplementation    Interval history: She underwent KUB 5/2024 showing mild to moderate stool burden and was recommended to continue fiber supplementation and add MiraLAX.  Her celiac testing was negative, fecal calprotectin was borderline elevated at 78 and Giardia antigen was negative.    She reports starting fiber supplementation a few times per week.  She states there are still episodes of alternating bowel habits with looser stool and sometimes harder stool which is difficult to pass.  She denies rectal bleeding.  She reports fatigue and underwent blood work through her primary care doctor showing a ferritin of 53, iron saturation 12% and low B12 levels.  She has not yet started iron supplements.    She continues to take omeprazole alternating with famotidine for reflux symptoms          Review of Systems   Constitutional:  Positive for fatigue. Negative for chills and fever.   HENT:  Negative for ear pain and sore throat.    Eyes:  Negative for pain and visual disturbance.   Respiratory:  Negative for cough and shortness of breath.    Cardiovascular:  Negative for chest pain and palpitations.   Gastrointestinal:  Positive for abdominal distention, abdominal pain, constipation and diarrhea. Negative for vomiting.   Genitourinary:  Negative for dysuria and hematuria.   Musculoskeletal:  Negative for arthralgias and back pain.   Skin:  Negative for color change and rash.   Neurological:  Negative for seizures and syncope.   All other systems reviewed and are negative.          Objective     /68   Pulse 71   Temp 98.2 °F (36.8 °C) (Tympanic)   Ht 5' 1\" (1.549 m)   Wt 80.1 kg (176 lb 9.6 oz)   BMI 33.37 kg/m²     Physical Exam  Vitals and nursing note reviewed.   Constitutional:       General: She is not in acute distress.     Appearance: She is well-developed.   HENT:      Head: Normocephalic and atraumatic. "   Eyes:      Conjunctiva/sclera: Conjunctivae normal.   Cardiovascular:      Rate and Rhythm: Normal rate and regular rhythm.      Heart sounds: No murmur heard.  Pulmonary:      Effort: Pulmonary effort is normal. No respiratory distress.      Breath sounds: Normal breath sounds.   Abdominal:      Palpations: Abdomen is soft.      Tenderness: There is no abdominal tenderness.   Musculoskeletal:         General: No swelling.      Cervical back: Neck supple.   Skin:     General: Skin is warm and dry.      Capillary Refill: Capillary refill takes less than 2 seconds.   Neurological:      Mental Status: She is alert.   Psychiatric:         Mood and Affect: Mood normal.

## 2024-11-26 ENCOUNTER — TELEPHONE (OUTPATIENT)
Dept: FAMILY MEDICINE CLINIC | Facility: CLINIC | Age: 59
End: 2024-11-26

## 2024-11-26 ENCOUNTER — OFFICE VISIT (OUTPATIENT)
Dept: FAMILY MEDICINE CLINIC | Facility: CLINIC | Age: 59
End: 2024-11-26

## 2024-11-26 VITALS
WEIGHT: 172.9 LBS | OXYGEN SATURATION: 99 % | HEIGHT: 61 IN | SYSTOLIC BLOOD PRESSURE: 130 MMHG | HEART RATE: 77 BPM | TEMPERATURE: 98.4 F | BODY MASS INDEX: 32.65 KG/M2 | RESPIRATION RATE: 18 BRPM | DIASTOLIC BLOOD PRESSURE: 84 MMHG

## 2024-11-26 DIAGNOSIS — I10 PRIMARY HYPERTENSION: ICD-10-CM

## 2024-11-26 DIAGNOSIS — M79.675 CHRONIC TOE PAIN, LEFT FOOT: ICD-10-CM

## 2024-11-26 DIAGNOSIS — G89.29 CHRONIC TOE PAIN, LEFT FOOT: ICD-10-CM

## 2024-11-26 DIAGNOSIS — R73.03 PREDIABETES: Primary | ICD-10-CM

## 2024-11-26 DIAGNOSIS — J45.20 MILD INTERMITTENT ASTHMA WITHOUT COMPLICATION: ICD-10-CM

## 2024-11-26 PROCEDURE — 99214 OFFICE O/P EST MOD 30 MIN: CPT | Performed by: FAMILY MEDICINE

## 2024-11-26 NOTE — PROGRESS NOTES
Name: Ila Oshea      : 1965      MRN: [id number]  Encounter Provider: Branden Patel MD  Encounter Date: 2024   Encounter department: [facility] [place]  :  Assessment & Plan  Prediabetes  Hemoglobin A1c of 6.3  Low carbohydrate dietary counseling provided to patient  Recommend resistance training 30 minutes 4 to 5 days a week       Primary hypertension  Well-controlled  Blood pressures at goal of less than 140/90  Continue amlodipine  Continue DASH diet         Chronic toe pain, left foot  Chronic left toe pain  No recent trauma  Reviewed x-ray of her left foot which showed degenerative changes  Referral to podiatry    Orders:    Diclofenac Sodium (VOLTAREN) 1 %; Apply 2 g topically 4 (four) times a day as needed (left foot pain)    Ambulatory Referral to Podiatry; Future    Mild intermittent asthma without complication  Stable  Continue albuterol as needed              History of Present Illness     59-year-old female with a history of prediabetes and hypertension who presents today for follow-up.  She is doing well overall.  Her main concern is chronic pain involving her left toes.  This has been an ongoing issue for many years.  The pain seems to be getting worse.  She cannot recollect any trauma involving her foot.      Review of Systems   Constitutional:  Negative for appetite change, chills, diaphoresis, fatigue, fever and unexpected weight change.   Eyes:  Negative for photophobia and visual disturbance.   Respiratory:  Negative for shortness of breath and wheezing.    Cardiovascular:  Negative for chest pain, palpitations and leg swelling.   Gastrointestinal:  Negative for abdominal pain, nausea and vomiting.   Musculoskeletal:  Positive for arthralgias. Negative for back pain.   Skin:  Negative for rash.   Neurological:  Negative for dizziness, seizures, syncope, light-headedness and headaches.   Psychiatric/Behavioral:  Negative for  "confusion.           Objective   /84 (BP Location: Right arm, Patient Position: Sitting, Cuff Size: Standard)   Pulse 77   Temp 98.4 °F (36.9 °C) (Temporal)   Resp 18   Ht 5' 1\" (1.549 m)   Wt 78.4 kg (172 lb 14.4 oz)   SpO2 99%   BMI 32.67 kg/m²      Physical Exam  Constitutional:       General: She is not in acute distress.     Appearance: Normal appearance. She is well-developed. She is not ill-appearing, toxic-appearing or diaphoretic.   HENT:      Head: Normocephalic and atraumatic.      Right Ear: External ear normal.      Left Ear: External ear normal.      Nose: Nose normal.      Mouth/Throat:      Mouth: Mucous membranes are moist.   Eyes:      General: No scleral icterus.        Right eye: No discharge.         Left eye: No discharge.      Extraocular Movements: Extraocular movements intact.      Pupils: Pupils are equal, round, and reactive to light.   Cardiovascular:      Rate and Rhythm: Normal rate and regular rhythm.      Pulses:           Dorsalis pedis pulses are 2+ on the right side and 2+ on the left side.        Posterior tibial pulses are 2+ on the right side and 2+ on the left side.      Heart sounds: Normal heart sounds. No murmur heard.     No friction rub. No gallop.   Pulmonary:      Effort: Pulmonary effort is normal. No respiratory distress.      Breath sounds: No stridor. No wheezing.   Abdominal:      General: Bowel sounds are normal. There is no distension.      Palpations: Abdomen is soft. There is no mass.      Tenderness: There is no abdominal tenderness. There is no guarding.      Hernia: No hernia is present.   Musculoskeletal:         General: Normal range of motion.      Cervical back: Normal range of motion.      Right lower leg: No edema.      Left lower leg: No edema.      Right foot: No deformity.      Left foot: No deformity.   Feet:      Right foot:      Skin integrity: No ulcer, blister, erythema, warmth or callus.      Left foot:      Skin integrity: No " ulcer, blister, erythema, warmth or callus.   Skin:     General: Skin is warm.      Capillary Refill: Capillary refill takes less than 2 seconds.   Neurological:      General: No focal deficit present.      Mental Status: She is alert and oriented to person, place, and time.      Cranial Nerves: No cranial nerve deficit.      Motor: No weakness.      Coordination: Coordination normal.      Gait: Gait normal.   Psychiatric:         Mood and Affect: Mood normal.         Behavior: Behavior normal.

## 2024-11-26 NOTE — ASSESSMENT & PLAN NOTE
Well-controlled  Blood pressures at goal of less than 140/90  Continue amlodipine  Continue DASH diet

## 2024-11-26 NOTE — TELEPHONE ENCOUNTER
Good evening ,    Pt asks she she needs to get lab work to check her A1C/sugar levels before next appt or if we will be doing a POCT A1C.     PT states she is not sure how we will track her preDM without any lab testing.

## 2024-11-26 NOTE — ASSESSMENT & PLAN NOTE
Hemoglobin A1c of 6.3  Low carbohydrate dietary counseling provided to patient  Recommend resistance training 30 minutes 4 to 5 days a week

## 2024-11-27 NOTE — TELEPHONE ENCOUNTER
Left vm as requested by Pt explaining that for now PCP recommends to implement the lifestyle changes. And that they will discuss if lab work is needed during their next appt.

## 2024-12-30 ENCOUNTER — TELEPHONE (OUTPATIENT)
Dept: GASTROENTEROLOGY | Facility: MEDICAL CENTER | Age: 59
End: 2024-12-30

## 2024-12-30 NOTE — TELEPHONE ENCOUNTER
Procedure Confirmation sent to patient via [employer] for patient to review instructions for procedure and reminder of Iron hold one week prior to procedure

## 2025-01-08 ENCOUNTER — OFFICE VISIT (OUTPATIENT)
Dept: FAMILY MEDICINE CLINIC | Facility: CLINIC | Age: 60
End: 2025-01-08

## 2025-01-08 VITALS
WEIGHT: 172.6 LBS | OXYGEN SATURATION: 97 % | SYSTOLIC BLOOD PRESSURE: 140 MMHG | DIASTOLIC BLOOD PRESSURE: 72 MMHG | RESPIRATION RATE: 14 BRPM | BODY MASS INDEX: 32.59 KG/M2 | HEART RATE: 87 BPM | HEIGHT: 61 IN

## 2025-01-08 DIAGNOSIS — A08.4 VIRAL GASTROENTERITIS: Primary | ICD-10-CM

## 2025-01-08 PROCEDURE — 99213 OFFICE O/P EST LOW 20 MIN: CPT

## 2025-01-08 RX ORDER — ONDANSETRON 4 MG/1
4 TABLET, FILM COATED ORAL EVERY 8 HOURS PRN
Qty: 20 TABLET | Refills: 0 | Status: SHIPPED | OUTPATIENT
Start: 2025-01-08

## 2025-01-08 RX ORDER — DICYCLOMINE HYDROCHLORIDE 10 MG/1
10 CAPSULE ORAL
Qty: 40 CAPSULE | Refills: 0 | Status: SHIPPED | OUTPATIENT
Start: 2025-01-08

## 2025-01-08 NOTE — PROGRESS NOTES
"Name: Ila Oshea      : 1965      MRN: [id number]  Encounter Provider: DEANDRE Martin  Encounter Date: 2025   Encounter department: [facility] [place]  :  Assessment & Plan  Viral gastroenteritis  -  to increase fluid intake and eat bland diet  - May take zofran and bentyl  PRN  -  about signs and symptoms of dehydration    Orders:    ondansetron (ZOFRAN) 4 mg tablet; Take 1 tablet (4 mg total) by mouth every 8 (eight) hours as needed for nausea or vomiting    dicyclomine (BENTYL) 10 mg capsule; Take 1 capsule (10 mg total) by mouth 4 (four) times a day (before meals and at bedtime)           History of Present Illness     Ila Oshea is a 59 y.o. with  has a past medical history of Asthma, GERD (gastroesophageal reflux disease), Hypertension, and Prediabetes.     Patient reports abdomina pain, diarrhea, nausea and loss of appetite. States diarrhea is improving. Patient denies any sick contact. She denies melena,  symptoms, and fever.       Review of Systems   Constitutional: Negative.  Negative for chills, fatigue and fever.   HENT: Negative.  Negative for ear pain and sore throat.    Eyes: Negative.    Respiratory: Negative.  Negative for cough and shortness of breath.    Cardiovascular: Negative.    Gastrointestinal:  Positive for abdominal pain, diarrhea and nausea.   Endocrine: Negative.    Genitourinary: Negative.    Musculoskeletal: Negative.    Skin: Negative.    Allergic/Immunologic: Negative.    Neurological: Negative.    Hematological: Negative.    Psychiatric/Behavioral: Negative.     All other systems reviewed and are negative.      Objective   /72 (BP Location: Left arm, Patient Position: Sitting, Cuff Size: Standard)   Pulse 87   Resp 14   Ht 5' 1\" (1.549 m)   Wt 78.3 kg (172 lb 9.6 oz)   SpO2 97%   BMI 32.61 kg/m²      Physical Exam  Vitals and nursing note reviewed.   Constitutional:       General: " She is not in acute distress.     Appearance: Normal appearance. She is well-developed.   HENT:      Head: Normocephalic and atraumatic.      Right Ear: Tympanic membrane normal.      Left Ear: Tympanic membrane normal.      Nose: Nose normal.      Mouth/Throat:      Mouth: Mucous membranes are moist.   Eyes:      Conjunctiva/sclera: Conjunctivae normal.   Cardiovascular:      Rate and Rhythm: Regular rhythm.      Pulses: Normal pulses.      Heart sounds: Normal heart sounds. No murmur heard.  Pulmonary:      Effort: Pulmonary effort is normal. No respiratory distress.      Breath sounds: Normal breath sounds.   Abdominal:      General: Abdomen is flat. Bowel sounds are normal.      Palpations: Abdomen is soft.      Tenderness: There is abdominal tenderness in the periumbilical area.   Musculoskeletal:         General: No swelling. Normal range of motion.      Cervical back: Normal range of motion and neck supple.   Skin:     General: Skin is warm and dry.      Capillary Refill: Capillary refill takes less than 2 seconds.   Neurological:      General: No focal deficit present.      Mental Status: She is alert and oriented to person, place, and time. Mental status is at baseline.   Psychiatric:         Mood and Affect: Mood normal.         Behavior: Behavior normal.         Thought Content: Thought content normal.         Judgment: Judgment normal.

## 2025-01-08 NOTE — LETTER
January 8, 2025     Patient: Ila Oshea  YOB: 1965  Date of Visit: 1/8/2025      To Whom it May Concern:    Ila Oshea is under my professional care. Ila was seen in my office on 1/8/2025. Please excuse patient until 1/9 or 1/10 if symptoms improved.     If you have any questions or concerns, please don't hesitate to call.         Sincerely,          DEANDRE Martin        CC: No Recipients

## 2025-01-10 ENCOUNTER — ANESTHESIA (OUTPATIENT)
Dept: ANESTHESIOLOGY | Facility: HOSPITAL | Age: 60
End: 2025-01-10

## 2025-01-10 ENCOUNTER — ANESTHESIA EVENT (OUTPATIENT)
Dept: ANESTHESIOLOGY | Facility: HOSPITAL | Age: 60
End: 2025-01-10

## 2025-01-16 ENCOUNTER — TELEPHONE (OUTPATIENT)
Age: 60
End: 2025-01-16

## 2025-01-16 NOTE — TELEPHONE ENCOUNTER
Colonoscopy 1/24/25    Pt had viral gastroenteritis 1.5 wks ago and wants to be sure ok to proceed. Pt returned to work on Tuesday. Asymptomatic. Advised ok to proceed. Pt appreciative; no further concerns.

## 2025-01-16 NOTE — TELEPHONE ENCOUNTER
Pt calling going over aki/dul prep instructions. We went over them thoroughly and pt understood all.     Pt also stating she had a virus last week and wants to confirm that she is okay to proceed w/ her EGD/Colon procedure on 1/24/25. I warm transfer to gi triage nurse Julisa.

## 2025-01-22 ENCOUNTER — TELEPHONE (OUTPATIENT)
Dept: GASTROENTEROLOGY | Facility: MEDICAL CENTER | Age: 60
End: 2025-01-22

## 2025-01-23 DIAGNOSIS — I10 PRIMARY HYPERTENSION: ICD-10-CM

## 2025-01-23 RX ORDER — AMLODIPINE BESYLATE 5 MG/1
5 TABLET ORAL DAILY
Qty: 30 TABLET | Refills: 2 | Status: SHIPPED | OUTPATIENT
Start: 2025-01-23

## 2025-01-23 NOTE — TELEPHONE ENCOUNTER
Pt called in upset because she was sent over prep instructions advising her not to drink after midnight but then received a vm advising her that she can have liquids to drink after midnight. She said she called in to confirm this and someone else gave conflicting info on this and she became upset and asked to speak with a nurse to confirm. I transferred her over to Mimi who was able to further assist.

## 2025-01-23 NOTE — TELEPHONE ENCOUNTER
"Spoke with pt, I advised her we prefer NPO after midnight and sips of water with medication at least 2 hours prior to procedure for colonoscopy. Pt was told she can have clear liquids up to 6am day of procedure. Per EGD instructions \"NOTHING TO DRINK 4 HOURS PRIOR TO PROCEDURE TIME\"     Pt understood and I informed her to try her best to limit fluid intake after midnight if she needs a little sip of clear liquid that is okay but nothing 4 hours prior to procedure time. She understood.   "

## 2025-01-24 ENCOUNTER — ANESTHESIA EVENT (OUTPATIENT)
Dept: GASTROENTEROLOGY | Facility: MEDICAL CENTER | Age: 60
End: 2025-01-24
Payer: COMMERCIAL

## 2025-01-24 ENCOUNTER — ANESTHESIA (OUTPATIENT)
Dept: GASTROENTEROLOGY | Facility: MEDICAL CENTER | Age: 60
End: 2025-01-24
Payer: COMMERCIAL

## 2025-01-24 ENCOUNTER — HOSPITAL ENCOUNTER (OUTPATIENT)
Dept: GASTROENTEROLOGY | Facility: MEDICAL CENTER | Age: 60
Setting detail: OUTPATIENT SURGERY
End: 2025-01-24
Payer: COMMERCIAL

## 2025-01-24 ENCOUNTER — TELEPHONE (OUTPATIENT)
Dept: GASTROENTEROLOGY | Facility: MEDICAL CENTER | Age: 60
End: 2025-01-24

## 2025-01-24 VITALS
HEART RATE: 66 BPM | HEIGHT: 61 IN | TEMPERATURE: 97.8 F | BODY MASS INDEX: 32.47 KG/M2 | DIASTOLIC BLOOD PRESSURE: 57 MMHG | OXYGEN SATURATION: 99 % | RESPIRATION RATE: 20 BRPM | SYSTOLIC BLOOD PRESSURE: 97 MMHG | WEIGHT: 172 LBS

## 2025-01-24 DIAGNOSIS — E61.1 IRON DEFICIENCY: ICD-10-CM

## 2025-01-24 DIAGNOSIS — K50.019 TERMINAL ILEITIS WITH COMPLICATION (HCC): Primary | ICD-10-CM

## 2025-01-24 DIAGNOSIS — K50.00 TERMINAL ILEITIS WITHOUT COMPLICATION (HCC): ICD-10-CM

## 2025-01-24 DIAGNOSIS — K58.2 IRRITABLE BOWEL SYNDROME WITH ALTERNATING BOWEL HABITS: ICD-10-CM

## 2025-01-24 PROCEDURE — 88305 TISSUE EXAM BY PATHOLOGIST: CPT | Performed by: PATHOLOGY

## 2025-01-24 PROCEDURE — 43239 EGD BIOPSY SINGLE/MULTIPLE: CPT | Performed by: INTERNAL MEDICINE

## 2025-01-24 PROCEDURE — 45380 COLONOSCOPY AND BIOPSY: CPT | Performed by: INTERNAL MEDICINE

## 2025-01-24 PROCEDURE — 45385 COLONOSCOPY W/LESION REMOVAL: CPT | Performed by: INTERNAL MEDICINE

## 2025-01-24 RX ORDER — SODIUM CHLORIDE, SODIUM LACTATE, POTASSIUM CHLORIDE, CALCIUM CHLORIDE 600; 310; 30; 20 MG/100ML; MG/100ML; MG/100ML; MG/100ML
125 INJECTION, SOLUTION INTRAVENOUS CONTINUOUS
Status: SHIPPED | OUTPATIENT
Start: 2025-01-24

## 2025-01-24 RX ORDER — PROPOFOL 10 MG/ML
INJECTION, EMULSION INTRAVENOUS AS NEEDED
Status: DISCONTINUED | OUTPATIENT
Start: 2025-01-24 | End: 2025-01-24

## 2025-01-24 RX ADMIN — PROPOFOL 30 MG: 10 INJECTION, EMULSION INTRAVENOUS at 10:33

## 2025-01-24 RX ADMIN — PROPOFOL 30 MG: 10 INJECTION, EMULSION INTRAVENOUS at 10:32

## 2025-01-24 RX ADMIN — PROPOFOL 120 MG: 10 INJECTION, EMULSION INTRAVENOUS at 10:14

## 2025-01-24 RX ADMIN — PROPOFOL 30 MG: 10 INJECTION, EMULSION INTRAVENOUS at 10:34

## 2025-01-24 RX ADMIN — PROPOFOL 30 MG: 10 INJECTION, EMULSION INTRAVENOUS at 10:18

## 2025-01-24 RX ADMIN — PROPOFOL 30 MG: 10 INJECTION, EMULSION INTRAVENOUS at 10:27

## 2025-01-24 RX ADMIN — PROPOFOL 30 MG: 10 INJECTION, EMULSION INTRAVENOUS at 10:35

## 2025-01-24 RX ADMIN — PROPOFOL 30 MG: 10 INJECTION, EMULSION INTRAVENOUS at 10:22

## 2025-01-24 RX ADMIN — PROPOFOL 30 MG: 10 INJECTION, EMULSION INTRAVENOUS at 10:26

## 2025-01-24 RX ADMIN — PROPOFOL 30 MG: 10 INJECTION, EMULSION INTRAVENOUS at 10:23

## 2025-01-24 RX ADMIN — PROPOFOL 30 MG: 10 INJECTION, EMULSION INTRAVENOUS at 10:21

## 2025-01-24 RX ADMIN — PROPOFOL 30 MG: 10 INJECTION, EMULSION INTRAVENOUS at 10:29

## 2025-01-24 RX ADMIN — SODIUM CHLORIDE, SODIUM LACTATE, POTASSIUM CHLORIDE, AND CALCIUM CHLORIDE 125 ML/HR: .6; .31; .03; .02 INJECTION, SOLUTION INTRAVENOUS at 10:09

## 2025-01-24 RX ADMIN — PROPOFOL 40 MG: 10 INJECTION, EMULSION INTRAVENOUS at 10:15

## 2025-01-24 RX ADMIN — PROPOFOL 30 MG: 10 INJECTION, EMULSION INTRAVENOUS at 10:30

## 2025-01-24 RX ADMIN — PROPOFOL 30 MG: 10 INJECTION, EMULSION INTRAVENOUS at 10:25

## 2025-01-24 RX ADMIN — PROPOFOL 30 MG: 10 INJECTION, EMULSION INTRAVENOUS at 10:20

## 2025-01-24 RX ADMIN — PROPOFOL 30 MG: 10 INJECTION, EMULSION INTRAVENOUS at 10:40

## 2025-01-24 RX ADMIN — PROPOFOL 30 MG: 10 INJECTION, EMULSION INTRAVENOUS at 10:39

## 2025-01-24 RX ADMIN — PROPOFOL 30 MG: 10 INJECTION, EMULSION INTRAVENOUS at 10:19

## 2025-01-24 RX ADMIN — PROPOFOL 30 MG: 10 INJECTION, EMULSION INTRAVENOUS at 10:36

## 2025-01-24 RX ADMIN — PROPOFOL 30 MG: 10 INJECTION, EMULSION INTRAVENOUS at 10:31

## 2025-01-24 RX ADMIN — PROPOFOL 30 MG: 10 INJECTION, EMULSION INTRAVENOUS at 10:24

## 2025-01-24 RX ADMIN — PROPOFOL 40 MG: 10 INJECTION, EMULSION INTRAVENOUS at 10:17

## 2025-01-24 RX ADMIN — PROPOFOL 30 MG: 10 INJECTION, EMULSION INTRAVENOUS at 10:28

## 2025-01-24 RX ADMIN — PROPOFOL 30 MG: 10 INJECTION, EMULSION INTRAVENOUS at 10:37

## 2025-01-24 RX ADMIN — PROPOFOL 40 MG: 10 INJECTION, EMULSION INTRAVENOUS at 10:16

## 2025-01-24 NOTE — ANESTHESIA PREPROCEDURE EVALUATION
"Procedure:  COLONOSCOPY  EGD     - denies any chest pain, palpitations, shortness of breath, syncope, lightheadedness, seizures   - denies any recent infectious symptoms such as fevers, chills, cough   - denies taking any anticoagulation medications or any issues with bleeding, bruising, clotting    Relevant Problems   ANESTHESIA (within normal limits)      CARDIO   (+) Hypertension      ENDO (within normal limits)      GI/HEPATIC   (+) GERD (gastroesophageal reflux disease)      /RENAL (within normal limits)      GYN (within normal limits)      HEMATOLOGY (within normal limits)      MUSCULOSKELETAL (within normal limits)      NEURO/PSYCH   (+) Chronic abdominal pain      PULMONARY   (+) Mild intermittent asthma without complication      Lab Results   Component Value Date    WBC 5.40 03/30/2024    HGB 11.6 03/30/2024    HCT 38.7 03/30/2024    MCV 84 03/30/2024     03/30/2024     Lab Results   Component Value Date     10/14/2014    SODIUM 140 03/30/2024    K 3.9 03/30/2024     03/30/2024    CO2 28 03/30/2024    ANIONGAP 12 10/14/2014    AGAP 7 03/30/2024    BUN 16 03/30/2024    CREATININE 0.87 03/30/2024    GLUF 102 (H) 03/30/2024    CALCIUM 9.2 03/30/2024    AST 17 03/30/2024    ALT 13 03/30/2024    ALKPHOS 87 03/30/2024    PROT 8.5 (H) 10/14/2014    TP 7.4 03/30/2024    BILITOT 0.3 10/14/2014    TBILI 0.32 03/30/2024    EGFR 74 03/30/2024     No results found for: \"PTT\"  No results found for: \"INR\", \"PROTIME\"    Physical Exam    Airway    Mallampati score: III  TM Distance: >3 FB  Neck ROM: full     Dental       Cardiovascular  Cardiovascular exam normal    Pulmonary  Pulmonary exam normal     Other Findings  post-pubertal.      Anesthesia Plan  ASA Score- 2     Anesthesia Type- IV sedation with anesthesia with ASA Monitors.         Additional Monitors:     Airway Plan:            Plan Factors-Exercise tolerance (METS): >4 METS.    Chart reviewed. EKG reviewed.  Existing labs reviewed. Patient " summary reviewed.          Obstructive sleep apnea risk education given perioperatively.        Induction- intravenous.    Postoperative Plan-         Informed Consent- Anesthetic plan and risks discussed with patient.  I personally reviewed this patient with the CRNA. Discussed and agreed on the Anesthesia Plan with the CRNA..      NPO Status:  No vitals data found for the desired time range.

## 2025-01-24 NOTE — H&P
H&P - Gastroenterology   Name: Ila Oshea 59 y.o. female I MRN: [id number]  Unit/Bed#:  I Date of Admission: 1/24/2025   Date of Service: 1/24/2025 I Hospital Day: 0     Assessment & Plan   This is a 59 y.o. year old female here for egd/colonoscopy, and she is stable and optimized for her procedure.    History of Present Illness    Ila Oshea is a 59 y.o. year old female who presents for iron deficiency, alternating bowel habits, borderline elevated fecal calprotectin    REVIEW OF SYSTEMS: Per the HPI, and otherwise unremarkable.    Historical Information   Past Medical History:   Diagnosis Date    Asthma     GERD (gastroesophageal reflux disease)     Hypertension     Prediabetes      Past Surgical History:   Procedure Laterality Date    NO PAST SURGERIES       Social History     Tobacco Use    Smoking status: Never    Smokeless tobacco: Never   Vaping Use    Vaping status: Never Used   Substance and Sexual Activity    Alcohol use: Yes     Comment: couple times/month    Drug use: Never    Sexual activity: Yes     Partners: Male     Birth control/protection: Post-menopausal     E-Cigarette/Vaping    E-Cigarette Use Never User      E-Cigarette/Vaping Substances    Nicotine No     THC No     CBD No     Flavoring No     Other No     Unknown No      Family history non-contributory    Meds/Allergies     Current Outpatient Medications:     albuterol (Ventolin HFA) 90 mcg/act inhaler    amLODIPine (NORVASC) 5 mg tablet    cyanocobalamin (VITAMIN B-12) 1000 MCG tablet    Diclofenac Sodium (VOLTAREN) 1 %    dicyclomine (BENTYL) 10 mg capsule    famotidine (PEPCID) 20 mg tablet    ferrous sulfate 324 (65 Fe) mg    omeprazole (PriLOSEC) 20 mg delayed release capsule    ondansetron (ZOFRAN) 4 mg tablet    psyllium (METAMUCIL) 58.6 % powder    fluticasone (FLONASE) 50 mcg/act nasal spray    triamcinolone (KENALOG) 0.1 % ointment    Current Facility-Administered Medications:     lactated ringers  infusion, 125 mL/hr, Intravenous, Continuous  No Known Allergies    Objective :       Physical Exam  Gen: NAD  Head: NCAT  CV: RRR  CHEST: Clear  ABD: soft, NT/ND  EXT: no edema

## 2025-01-24 NOTE — TELEPHONE ENCOUNTER
Called and spoke to patient to schedule follow up in an urgent slot per Dr. Jaiyeola, pt has scheduled          Message  Received: Today  Diana M Jaiyeola, MD  [facility] [place] [facility]er[facility]  Please reschedule this patient's June appointment for a sooner visit with me at the end of February or early March.  An urgent slot can be used.

## 2025-01-24 NOTE — ANESTHESIA POSTPROCEDURE EVALUATION
Post-Op Assessment Note    CV Status:  Stable  Pain Score: 0    Pain management: adequate       Mental Status:  Alert and awake   Hydration Status:  Euvolemic   PONV Controlled:  Controlled   Airway Patency:  Patent     Post Op Vitals Reviewed: Yes    No anethesia notable event occurred.    Staff: Anesthesiologist, CRNA           Last Filed PACU Vitals:  Vitals Value Taken Time   Temp     Pulse 75 01/24/25 1046   BP 94/52 01/24/25 1046   Resp 20 01/24/25 1046   SpO2 97 % 01/24/25 1046

## 2025-01-27 ENCOUNTER — TELEPHONE (OUTPATIENT)
Dept: GASTROENTEROLOGY | Facility: MEDICAL CENTER | Age: 60
End: 2025-01-27

## 2025-01-27 DIAGNOSIS — K58.2 IRRITABLE BOWEL SYNDROME WITH ALTERNATING BOWEL HABITS: Primary | ICD-10-CM

## 2025-01-27 NOTE — TELEPHONE ENCOUNTER
Called the patient's primary and left a VM indicating that we are reaching out as our provider would like some ordered labwork done prior to their upcoming CT. Indicated I would send a [employer] message as well, and provided office number. Thank you.     ----- Message from Diana Jaiyeola, MD sent at 1/27/2025  9:17 AM EST -----  Please let this patient know that she will need to have blood work done prior to her upcoming CT scan on 1/31.  I placed the order for the blood work and she should go at least 1 to 2 days prior to have the blood work done.  Thanks

## 2025-01-27 NOTE — TELEPHONE ENCOUNTER
Called the patient a second time at the end of the day and left another VM indicating that we are trying to reach them regarding labwork that Dr. Jaiyeola would like them to have done prior to their appt Friday. Indicated that I left a PENRITHt message with this information as well. Provided office callback number.

## 2025-01-28 ENCOUNTER — RESULTS FOLLOW-UP (OUTPATIENT)
Dept: GASTROENTEROLOGY | Facility: MEDICAL CENTER | Age: 60
End: 2025-01-28

## 2025-01-28 PROCEDURE — 88305 TISSUE EXAM BY PATHOLOGIST: CPT | Performed by: PATHOLOGY

## 2025-01-28 NOTE — TELEPHONE ENCOUNTER
Left detailed voicemail that blood work is necessary to check kidney function before getting contrast

## 2025-01-28 NOTE — TELEPHONE ENCOUNTER
Patient was transferred. Spoke with the patient who had questions about what test was ordered, how pertinent it is to the CT scan coming up, and what lab locations/ hours were available. Provided patient with the number for the lab at [facility] which opens at 6AM. Sending a message to Dr. Jaiyeola & team regarding patient's questions about the lab and how soon it needs to be completed before Friday's appointment. Thank you.

## 2025-01-28 NOTE — RESULT ENCOUNTER NOTE
Results relayed via Mychart  1 aphthous ulcer in the terminal ileum with chronic and mild active ileitis.  CT enterography is pending and office follow-up is scheduled

## 2025-01-29 ENCOUNTER — APPOINTMENT (OUTPATIENT)
Dept: LAB | Facility: HOSPITAL | Age: 60
End: 2025-01-29
Payer: COMMERCIAL

## 2025-01-29 DIAGNOSIS — E61.1 IRON DEFICIENCY: ICD-10-CM

## 2025-01-29 DIAGNOSIS — R42 DIZZINESS: ICD-10-CM

## 2025-01-29 DIAGNOSIS — E53.8 B12 DEFICIENCY: ICD-10-CM

## 2025-01-29 DIAGNOSIS — K58.2 IRRITABLE BOWEL SYNDROME WITH ALTERNATING BOWEL HABITS: ICD-10-CM

## 2025-01-29 LAB
ANION GAP SERPL CALCULATED.3IONS-SCNC: 7 MMOL/L (ref 4–13)
BASOPHILS # BLD AUTO: 0.02 THOUSANDS/ΜL (ref 0–0.1)
BASOPHILS NFR BLD AUTO: 0 % (ref 0–1)
BUN SERPL-MCNC: 13 MG/DL (ref 5–25)
CALCIUM SERPL-MCNC: 9.2 MG/DL (ref 8.4–10.2)
CHLORIDE SERPL-SCNC: 107 MMOL/L (ref 96–108)
CO2 SERPL-SCNC: 29 MMOL/L (ref 21–32)
CREAT SERPL-MCNC: 1.04 MG/DL (ref 0.6–1.3)
EOSINOPHIL # BLD AUTO: 0.11 THOUSAND/ΜL (ref 0–0.61)
EOSINOPHIL NFR BLD AUTO: 2 % (ref 0–6)
ERYTHROCYTE [DISTWIDTH] IN BLOOD BY AUTOMATED COUNT: 15 % (ref 11.6–15.1)
GFR SERPL CREATININE-BSD FRML MDRD: 59 ML/MIN/1.73SQ M
GLUCOSE P FAST SERPL-MCNC: 105 MG/DL (ref 65–99)
HCT VFR BLD AUTO: 41.1 % (ref 34.8–46.1)
HGB BLD-MCNC: 12.1 G/DL (ref 11.5–15.4)
IMM GRANULOCYTES # BLD AUTO: 0.02 THOUSAND/UL (ref 0–0.2)
IMM GRANULOCYTES NFR BLD AUTO: 0 % (ref 0–2)
LYMPHOCYTES # BLD AUTO: 2.16 THOUSANDS/ΜL (ref 0.6–4.47)
LYMPHOCYTES NFR BLD AUTO: 37 % (ref 14–44)
MCH RBC QN AUTO: 25.4 PG (ref 26.8–34.3)
MCHC RBC AUTO-ENTMCNC: 29.4 G/DL (ref 31.4–37.4)
MCV RBC AUTO: 86 FL (ref 82–98)
MONOCYTES # BLD AUTO: 0.51 THOUSAND/ΜL (ref 0.17–1.22)
MONOCYTES NFR BLD AUTO: 9 % (ref 4–12)
NEUTROPHILS # BLD AUTO: 3 THOUSANDS/ΜL (ref 1.85–7.62)
NEUTS SEG NFR BLD AUTO: 52 % (ref 43–75)
NRBC BLD AUTO-RTO: 0 /100 WBCS
PLATELET # BLD AUTO: 327 THOUSANDS/UL (ref 149–390)
PMV BLD AUTO: 10.5 FL (ref 8.9–12.7)
POTASSIUM SERPL-SCNC: 4.1 MMOL/L (ref 3.5–5.3)
RBC # BLD AUTO: 4.76 MILLION/UL (ref 3.81–5.12)
SODIUM SERPL-SCNC: 143 MMOL/L (ref 135–147)
VIT B12 SERPL-MCNC: 390 PG/ML (ref 180–914)
WBC # BLD AUTO: 5.82 THOUSAND/UL (ref 4.31–10.16)

## 2025-01-29 PROCEDURE — 80048 BASIC METABOLIC PNL TOTAL CA: CPT

## 2025-01-29 PROCEDURE — 82607 VITAMIN B-12: CPT

## 2025-01-29 PROCEDURE — 85025 COMPLETE CBC W/AUTO DIFF WBC: CPT

## 2025-01-29 PROCEDURE — 36415 COLL VENOUS BLD VENIPUNCTURE: CPT

## 2025-01-30 ENCOUNTER — TELEPHONE (OUTPATIENT)
Age: 60
End: 2025-01-30

## 2025-01-30 NOTE — TELEPHONE ENCOUNTER
Pt calling in, reports she received a call from [facility] PEC team regarding CT small bowel authorization and received a call from insurance. She is schedule for tomorrow and site of service was denied and needs P2P for medical necessity. Warm transferred to [facility].

## 2025-02-03 ENCOUNTER — TELEPHONE (OUTPATIENT)
Age: 60
End: 2025-02-03

## 2025-02-03 NOTE — TELEPHONE ENCOUNTER
Hi,     I cannot assist in providing instructions for CT, she should definitely call [facility] for any concerns. I will update the referral with auth information for external appt. You can fax over the script to  as I do not fax.     Thank you - Paulette       I also sent a message to the office to assist with questions regarding CT (informed patient she would need to contact [facility] to go over prep instructions). Patient was not able to provide fax number. Patient stated insurance suggested for patient to contact office directly to fax over script.

## 2025-02-03 NOTE — TELEPHONE ENCOUNTER
Faxed over script to [facility] diagnostic testing at [facility] and [facility]

## 2025-02-03 NOTE — TELEPHONE ENCOUNTER
Patients GI provider:  Dr. Jaiyeola    Number to return call: [phone]    Reason for call: [facility] calling to advise that patient called there to schedule CT small bowel enterography.     Scheduled procedure/appointment date if applicable: 3/4/25

## 2025-02-03 NOTE — TELEPHONE ENCOUNTER
Patients GI provider:  Dr. Jaiyeola    Number to return call: [phone]    Reason for call: Pt called in requesting to speak with Paulette from the prior auth team regarding upcoming appt on 2/20/2025 at 2:30pm at [facility]. Patient states a new script [place] [address]. Patient has provided new Case number-[id number] and Prior Auth-[id number].     Patient also has questions regarding prep for CT scan and why it is different from when patient was scheduled at [facility]. Please reach out to patient, thank you.    Scheduled procedure/appointment date if applicable: Apt/procedure 3/4/2025

## 2025-02-07 ENCOUNTER — RESULTS FOLLOW-UP (OUTPATIENT)
Dept: OBGYN CLINIC | Facility: CLINIC | Age: 60
End: 2025-02-07

## 2025-02-12 ENCOUNTER — TELEPHONE (OUTPATIENT)
Dept: GASTROENTEROLOGY | Facility: MEDICAL CENTER | Age: 60
End: 2025-02-12

## 2025-02-12 NOTE — TELEPHONE ENCOUNTER
Pt called in asking to speak directly to the office regarding CT scan that she has been trying to get approved for a few weeks now. I advised her that I can send a message over to [facility] for an update on the approval for CT scan but pt insisted on being connect with an MA at the office. Transferred her over to [facility] for further assistance. Please provide pt with an update on the CT pre auth if possible.

## 2025-02-13 ENCOUNTER — TELEPHONE (OUTPATIENT)
Dept: GASTROENTEROLOGY | Facility: MEDICAL CENTER | Age: 60
End: 2025-02-13

## 2025-02-13 DIAGNOSIS — K50.019 TERMINAL ILEITIS WITH COMPLICATION (HCC): Primary | ICD-10-CM

## 2025-02-13 NOTE — TELEPHONE ENCOUNTER
Pod transfer the call to me I spoke to pt she had informed me that [facility] did not received the second prior auth to get her CT approved I told her I will related the message to the MA that is taken care of this and she will get in touch with her ASAP

## 2025-02-18 ENCOUNTER — OFFICE VISIT (OUTPATIENT)
Dept: FAMILY MEDICINE CLINIC | Facility: CLINIC | Age: 60
End: 2025-02-18

## 2025-02-18 VITALS
HEIGHT: 61 IN | WEIGHT: 170.1 LBS | BODY MASS INDEX: 32.12 KG/M2 | OXYGEN SATURATION: 99 % | RESPIRATION RATE: 18 BRPM | TEMPERATURE: 98.2 F | SYSTOLIC BLOOD PRESSURE: 138 MMHG | DIASTOLIC BLOOD PRESSURE: 84 MMHG | HEART RATE: 88 BPM

## 2025-02-18 DIAGNOSIS — K21.9 GASTROESOPHAGEAL REFLUX DISEASE, UNSPECIFIED WHETHER ESOPHAGITIS PRESENT: ICD-10-CM

## 2025-02-18 DIAGNOSIS — R10.9 CHRONIC ABDOMINAL PAIN: Primary | ICD-10-CM

## 2025-02-18 DIAGNOSIS — G89.29 CHRONIC ABDOMINAL PAIN: Primary | ICD-10-CM

## 2025-02-18 DIAGNOSIS — K59.00 CONSTIPATION, UNSPECIFIED CONSTIPATION TYPE: ICD-10-CM

## 2025-02-18 PROCEDURE — 99214 OFFICE O/P EST MOD 30 MIN: CPT | Performed by: FAMILY MEDICINE

## 2025-02-18 RX ORDER — POLYETHYLENE GLYCOL 3350 17 G/17G
17 POWDER, FOR SOLUTION ORAL DAILY
Qty: 510 G | Refills: 1 | Status: SHIPPED | OUTPATIENT
Start: 2025-02-18

## 2025-02-18 RX ORDER — SUCRALFATE 1 G/1
1 TABLET ORAL 3 TIMES DAILY
Qty: 90 TABLET | Refills: 0 | Status: SHIPPED | OUTPATIENT
Start: 2025-02-18

## 2025-02-18 NOTE — ASSESSMENT & PLAN NOTE
Chronic abdominal pain  Patient has alternating constipation and diarrhea  Status post EGD/colonoscopy which showed single ulcer in the terminal ileum.  CT enterography pending for further evaluation  Differentials include IBD  Follow-up with GI outpatient    Orders:    sucralfate (CARAFATE) 1 g tablet; Take 1 tablet (1 g total) by mouth 3 (three) times a day

## 2025-02-18 NOTE — PROGRESS NOTES
Name: Ila Oshea      : 1965      MRN: [id number]  Encounter Provider: Branden Patel MD  Encounter Date: 2025   Encounter department: [facility]  :  Assessment & Plan  Chronic abdominal pain  Chronic abdominal pain  Patient has alternating constipation and diarrhea  Status post EGD/colonoscopy which showed single ulcer in the terminal ileum.  CT enterography pending for further evaluation  Differentials include IBD  Follow-up with GI outpatient    Orders:    sucralfate (CARAFATE) 1 g tablet; Take 1 tablet (1 g total) by mouth 3 (three) times a day    Gastroesophageal reflux disease, unspecified whether esophagitis present  Experiencing worsening heartburn  Patient has been on pepcid and omprazole for many years  Continue antireflux diet  Trial of Carafate    Orders:    sucralfate (CARAFATE) 1 g tablet; Take 1 tablet (1 g total) by mouth 3 (three) times a day    Constipation, unspecified constipation type  Constipation  Symptoms are worsening with daily iron supplement  Recommend every other day dosing of iron supplement  Initiate stool softener  Recommend high-fiber diet  Orders:    polyethylene glycol (GLYCOLAX) 17 GM/SCOOP powder; Take 17 g by mouth daily           History of Present Illness   59-year-old female with a history of GERD who presents today for follow-up.  Patient reports that she continues to experience a lot of abdominal discomfort.  She reports feeling very constipated lately.  She is taking iron supplement which tends to worsen her constipation and stomach upset.  She takes iron for history of iron deficiency anemia.  Her most recent bowel movement was a few days ago.  She is postmenopausal.  She is following with GI with regards for her abdominal discomfort.  She reports experiencing a lot of burning pain in her abdomen.  She is taking Pepcid and omeprazole.  She was prescribed Carafate in the past which she found  "beneficial.  Patient reports intermittent nonbilious and nonbloody emesis for the past few days.  She denies any melena, hematochezia, or hematemesis.  She is scheduled to have a CT scan of her small bowel for further evaluation for inflammatory bowel disease.  She denies any family history of any GI disorder.        Review of Systems   Constitutional:  Positive for appetite change. Negative for chills, diaphoresis, fatigue and fever.   HENT:  Negative for sore throat.    Eyes:  Negative for visual disturbance.   Respiratory:  Negative for cough, shortness of breath and wheezing.    Cardiovascular:  Negative for chest pain, palpitations and leg swelling.   Gastrointestinal:  Positive for abdominal pain, constipation and vomiting. Negative for diarrhea and nausea.   Musculoskeletal:  Negative for back pain.   Skin:  Negative for rash.   Neurological:  Negative for seizures, syncope and headaches.   All other systems reviewed and are negative.      Objective   /84 (BP Location: Right arm, Patient Position: Sitting, Cuff Size: Standard)   Pulse 88   Temp 98.2 °F (36.8 °C) (Temporal)   Resp 18   Ht 5' 1\" (1.549 m)   Wt 77.2 kg (170 lb 1.6 oz)   SpO2 99%   BMI 32.14 kg/m²      Physical Exam  Constitutional:       General: She is not in acute distress.     Appearance: Normal appearance. She is well-developed. She is not ill-appearing, toxic-appearing or diaphoretic.   HENT:      Head: Normocephalic and atraumatic.      Right Ear: External ear normal.      Left Ear: External ear normal.      Nose: Nose normal.      Mouth/Throat:      Pharynx: No oropharyngeal exudate.   Eyes:      General: No scleral icterus.        Right eye: No discharge.         Left eye: No discharge.      Extraocular Movements: Extraocular movements intact.      Pupils: Pupils are equal, round, and reactive to light.   Cardiovascular:      Rate and Rhythm: Normal rate and regular rhythm.      Heart sounds: Normal heart sounds. No murmur " heard.     No friction rub. No gallop.   Pulmonary:      Effort: Pulmonary effort is normal. No respiratory distress.      Breath sounds: Normal breath sounds. No stridor. No wheezing or rhonchi.   Abdominal:      General: Bowel sounds are normal. There is no distension.      Palpations: Abdomen is soft. There is no mass.      Tenderness: There is no abdominal tenderness. There is no guarding or rebound.   Musculoskeletal:         General: Normal range of motion.      Cervical back: Normal range of motion.      Right lower leg: No edema.      Left lower leg: No edema.   Skin:     General: Skin is warm.      Capillary Refill: Capillary refill takes less than 2 seconds.      Findings: No lesion or rash.   Neurological:      General: No focal deficit present.      Mental Status: She is alert and oriented to person, place, and time.      Cranial Nerves: No cranial nerve deficit.      Motor: No weakness.      Gait: Gait normal.   Psychiatric:         Mood and Affect: Mood normal.         Behavior: Behavior normal.

## 2025-02-18 NOTE — ASSESSMENT & PLAN NOTE
Experiencing worsening heartburn  Patient has been on pepcid and omprazole for many years  Continue antireflux diet  Trial of Carafate    Orders:    sucralfate (CARAFATE) 1 g tablet; Take 1 tablet (1 g total) by mouth 3 (three) times a day

## 2025-02-18 NOTE — LETTER
February 18, 2025     Patient: Ila Oshea  YOB: 1965  Date of Visit: 2/18/2025      To Whom it May Concern:    Ila Oshea is under my professional care. Ila was seen in my office on 2/18/2025. Ila may return to work on 2/19/2025 .    If you have any questions or concerns, please don't hesitate to call.         Sincerely,          Branden Patel MD        CC: No Recipients

## 2025-02-24 ENCOUNTER — RESULTS FOLLOW-UP (OUTPATIENT)
Dept: GASTROENTEROLOGY | Facility: MEDICAL CENTER | Age: 60
End: 2025-02-24

## 2025-03-04 ENCOUNTER — OFFICE VISIT (OUTPATIENT)
Dept: GASTROENTEROLOGY | Facility: MEDICAL CENTER | Age: 60
End: 2025-03-04
Payer: COMMERCIAL

## 2025-03-04 VITALS
DIASTOLIC BLOOD PRESSURE: 76 MMHG | SYSTOLIC BLOOD PRESSURE: 126 MMHG | HEIGHT: 63 IN | TEMPERATURE: 98.2 F | BODY MASS INDEX: 29.41 KG/M2 | WEIGHT: 166 LBS | HEART RATE: 73 BPM

## 2025-03-04 DIAGNOSIS — R79.89 LOW VITAMIN B12 LEVEL: ICD-10-CM

## 2025-03-04 DIAGNOSIS — K50.00 TERMINAL ILEITIS WITHOUT COMPLICATION (HCC): ICD-10-CM

## 2025-03-04 DIAGNOSIS — K59.00 CONSTIPATION, UNSPECIFIED CONSTIPATION TYPE: ICD-10-CM

## 2025-03-04 DIAGNOSIS — K50.00 CROHN'S DISEASE OF SMALL INTESTINE WITHOUT COMPLICATION (HCC): Primary | ICD-10-CM

## 2025-03-04 DIAGNOSIS — E61.1 IRON DEFICIENCY: ICD-10-CM

## 2025-03-04 PROCEDURE — 99214 OFFICE O/P EST MOD 30 MIN: CPT | Performed by: INTERNAL MEDICINE

## 2025-03-04 RX ORDER — BUDESONIDE 3 MG/1
CAPSULE, COATED PELLETS ORAL
Qty: 203 CAPSULE | Refills: 0 | Status: SHIPPED | OUTPATIENT
Start: 2025-03-04 | End: 2025-05-20

## 2025-03-04 NOTE — PROGRESS NOTES
Name: Ila Oshea      : 1965      MRN: [id number]  Encounter Provider: Diana M Jaiyeola, MD  Encounter Date: 3/4/2025   Encounter department: [facility]  :  Assessment & Plan  Crohn's disease of small intestine without complication (HCC)  While undergoing workup for iron deficiency, borderline elevated fecal calprotectin and alternating bowel habits she was found to have a single aphthous ulcer in the terminal ileum.  Biopsy showed chronic and active mild ileitis.  She denies history of regular NSAID use.  Her subsequent CT enterography was negative for any significant small bowel inflammation.    We discussed that her ileal findings may be related to mild Crohn's disease.  We discussed the Crohn's disease is a chronic condition requiring induction and maintenance of remission to prevent complications in the future.  We discussed options including watchful waiting with repeat colonoscopy yearly versus a trial of the budesonide course and repeat colonoscopy in 6 to 12 months.  She is agreeable to a course of budesonide remission..  If she has persistent ileitis can consider transition to a biologic treatment at that time, however if her ileal aphthous ulcers are resolved she could continue colonoscopy for surveillance off treatment.    - Start budesonide course as prescribed  - Obtain laboratory work today including CBC and iron panel.    - Your next colonoscopy is due in 6 to 12 months    Health maintenance:  - Yearly flu shot. Avoid live vaccines  - Pneumovax and Prevnar 13 every 5 years  - Pap smear per GYN  - Yearly skin exam         Orders:    budesonide (ENTOCORT EC) 3 MG capsule; Take 3 capsules (9 mg total) by mouth daily for 56 days, THEN 2 capsules (6 mg total) daily for 14 days, THEN 1 capsule (3 mg total) daily for 7 days.    Terminal ileitis without complication (HCC)    Orders:    budesonide (ENTOCORT EC) 3 MG capsule; Take 3 capsules (9 mg  total) by mouth daily for 56 days, THEN 2 capsules (6 mg total) daily for 14 days, THEN 1 capsule (3 mg total) daily for 7 days.    Iron deficiency  She was previously found to have iron deficiency without anemia.  She is taking oral iron however reports GI upset and constipation symptoms.  If she has persistent iron deficiency on repeat blood work she may require hematology referral for IV iron infusions.  Orders:    CBC and differential; Future    Iron Panel (Includes Ferritin, Iron Sat%, Iron, and TIBC); Future    Low vitamin B12 level         Constipation, unspecified constipation type  She reports constipation predominant symptoms and may have underlying IBS as well.  After starting budesonide if she continues to have constipation we will start low-dose Linzess to 72 mcg daily and uptitrate as needed to an effective dose.           History of Present Illness   Ila Oshea is a 59 y.o. female who presents for follow-up evaluation.  She was last seen in the GI office in November 2024.  She had a chronic history of alternating bowel habits and previously underwent stool testing showing a borderline elevated fecal calprotectin.  She also had workup showing iron deficiency and was recommended to start oral iron supplementation and schedule updated EGD and colonoscopy    Interval history: January 2025 EGD was normal.  Gastric biopsy showed gastritis negative for H. pylori and duodenal biopsies were normal.  Colonoscopy showed a single aphthous ulcer in the terminal ileum and subcentimeter rectal polyp.  Terminal ileal biopsy showed chronic and focal mild active ileitis.  The remainder of her colonic biopsies were normal and the colon polyp showed a Schwann cell hamartoma.    She continues to report generalized abdominal discomfort.  She also notes alternating bowel habits with loose stools followed by constipation.  She has been taking Metamucil most days of the week and was recently prescribed MiraLAX  but has not yet started the medication.  She reports nausea and stomach upset that can be associated with bowel movements as well.  She was prescribed Carafate and Bentyl which did not help her symptoms.    1/2025 hemoglobin 12.1, platelets 327, B12 390      2/2025 CT abdomen pelvis/enterography showed no significant inflammation    endoscopy and colonoscopy in 2020 were endoscopically and pathologically normal.   HPI  History obtained from: patient  Review of Systems   Constitutional:  Negative for chills and fever.   HENT:  Negative for ear pain and sore throat.    Eyes:  Negative for pain and visual disturbance.   Respiratory:  Negative for cough and shortness of breath.    Cardiovascular:  Negative for chest pain and palpitations.   Gastrointestinal:  Positive for abdominal pain, constipation, diarrhea and nausea. Negative for vomiting.   Genitourinary:  Negative for dysuria and hematuria.   Musculoskeletal:  Negative for arthralgias and back pain.   Skin:  Negative for color change and rash.   Neurological:  Negative for seizures and syncope.   All other systems reviewed and are negative.   A complete review of systems is negative other than that noted above in the HPI.    Past Medical History   Past Medical History:   Diagnosis Date    Asthma     GERD (gastroesophageal reflux disease)     Hypertension     Prediabetes      Past Surgical History:   Procedure Laterality Date    NO PAST SURGERIES       Family History   Problem Relation Age of Onset    No Known Problems Mother     No Known Problems Father     No Known Problems Daughter     Dementia Maternal Grandmother         Without behavioral disturbance, unspecified dementia type     Heart failure Maternal Grandmother     Hypertension Maternal Grandmother     Diabetes type II Maternal Grandmother     No Known Problems Paternal Grandfather     Breast cancer Maternal Aunt         58    Breast cancer Maternal Aunt         60s    No Known Problems Maternal Aunt      Colon cancer Neg Hx     Ovarian cancer Neg Hx       reports that she has never smoked. She has never used smokeless tobacco. She reports that she does not currently use alcohol. She reports that she does not use drugs.  Current Outpatient Medications   Medication Instructions    albuterol (Ventolin HFA) 90 mcg/act inhaler 2 puffs, Inhalation, Every 6 hours PRN    amLODIPine (NORVASC) 5 mg, Oral, Daily    budesonide (ENTOCORT EC) 3 MG capsule Take 3 capsules (9 mg total) by mouth daily for 56 days, THEN 2 capsules (6 mg total) daily for 14 days, THEN 1 capsule (3 mg total) daily for 7 days.    cyanocobalamin (VITAMIN B-12) 1,000 mcg, Oral, Daily    Diclofenac Sodium (VOLTAREN) 2 g, Topical, 4 times daily PRN    dicyclomine (BENTYL) 10 mg, Oral, 4 times daily (before meals and at bedtime)    famotidine (PEPCID) 20 mg, Oral, Daily    ferrous sulfate 324 mg, Oral, Daily before breakfast    fluticasone (FLONASE) 50 mcg/act nasal spray 1 spray, Nasal, Daily    omeprazole (PRILOSEC) 20 mg, Oral, Daily    ondansetron (ZOFRAN) 4 mg, Oral, Every 8 hours PRN    polyethylene glycol (GLYCOLAX) 17 g, Oral, Daily    psyllium (METAMUCIL) 58.6 % powder 1 packet, Oral, Daily    sucralfate (CARAFATE) 1 g, Oral, 3 times daily    triamcinolone (KENALOG) 0.1 % ointment    No Known Allergies   Current Outpatient Medications   Medication Sig Dispense Refill    albuterol (Ventolin HFA) 90 mcg/act inhaler Inhale 2 puffs every 6 (six) hours as needed for wheezing or shortness of breath 18 g 2    amLODIPine (NORVASC) 5 mg tablet TAKE 1 TABLET(5 MG) BY MOUTH DAILY 30 tablet 2    cyanocobalamin (VITAMIN B-12) 1000 MCG tablet Take 1 tablet (1,000 mcg total) by mouth daily 90 tablet 1    Diclofenac Sodium (VOLTAREN) 1 % Apply 2 g topically 4 (four) times a day as needed (left foot pain) 150 g 1    famotidine (PEPCID) 20 mg tablet Take 1 tablet (20 mg total) by mouth daily 90 tablet 3    ferrous sulfate 324 (65 Fe) mg Take 1 tablet (324 mg total)  "by mouth daily before breakfast (Patient taking differently: Take 324 mg by mouth daily before breakfast Every other day) 30 tablet 3    omeprazole (PriLOSEC) 20 mg delayed release capsule Take 1 capsule (20 mg total) by mouth daily 90 capsule 3    polyethylene glycol (GLYCOLAX) 17 GM/SCOOP powder Take 17 g by mouth daily 510 g 1    psyllium (METAMUCIL) 58.6 % powder Take 1 packet by mouth daily 30 packet 3    sucralfate (CARAFATE) 1 g tablet Take 1 tablet (1 g total) by mouth 3 (three) times a day 90 tablet 0    dicyclomine (BENTYL) 10 mg capsule Take 1 capsule (10 mg total) by mouth 4 (four) times a day (before meals and at bedtime) (Patient not taking: Reported on 3/4/2025) 40 capsule 0    fluticasone (FLONASE) 50 mcg/act nasal spray 1 spray into each nostril daily (Patient not taking: Reported on 3/4/2025) 16 g 2    ondansetron (ZOFRAN) 4 mg tablet Take 1 tablet (4 mg total) by mouth every 8 (eight) hours as needed for nausea or vomiting (Patient not taking: Reported on 3/4/2025) 20 tablet 0    triamcinolone (KENALOG) 0.1 % ointment  (Patient not taking: Reported on 3/4/2025)       No current facility-administered medications for this visit.     Objective   /76 (BP Location: Right arm, Patient Position: Sitting, Cuff Size: Standard)   Pulse 73   Temp 98.2 °F (36.8 °C) (Tympanic)   Ht 5' 3\" (1.6 m)   Wt 75.3 kg (166 lb)   BMI 29.41 kg/m²     Physical Exam  Vitals and nursing note reviewed.   Constitutional:       General: She is not in acute distress.     Appearance: She is well-developed.   HENT:      Head: Normocephalic and atraumatic.   Eyes:      Conjunctiva/sclera: Conjunctivae normal.   Cardiovascular:      Rate and Rhythm: Normal rate and regular rhythm.      Heart sounds: No murmur heard.  Pulmonary:      Effort: Pulmonary effort is normal. No respiratory distress.      Breath sounds: Normal breath sounds.   Abdominal:      Palpations: Abdomen is soft.      Tenderness: There is generalized " abdominal tenderness.   Musculoskeletal:         General: No swelling.      Cervical back: Neck supple.   Skin:     General: Skin is warm and dry.      Capillary Refill: Capillary refill takes less than 2 seconds.   Neurological:      Mental Status: She is alert.   Psychiatric:         Mood and Affect: Mood normal.            Lab Results: I personally reviewed relevant lab results.       Results for orders placed during the hospital encounter of 01/24/25    EGD    Impression  The esophagus appeared normal.  The stomach and duodenum appeared normal. Performed random biopsy to rule out celiac disease.      RECOMMENDATION:  Await pathology results  Proceed with colonoscopy            Diana M Jaiyeola, MD

## 2025-03-04 NOTE — ASSESSMENT & PLAN NOTE
While undergoing workup for iron deficiency, borderline elevated fecal calprotectin and alternating bowel habits she was found to have a single aphthous ulcer in the terminal ileum.  Biopsy showed chronic and active mild ileitis.  She denies history of regular NSAID use.  Her subsequent CT enterography was negative for any significant small bowel inflammation.    We discussed that her ileal findings may be related to mild Crohn's disease.  We discussed the Crohn's disease is a chronic condition requiring induction and maintenance of remission to prevent complications in the future.  We discussed options including watchful waiting with repeat colonoscopy yearly versus a trial of the budesonide course and repeat colonoscopy in 6 to 12 months.  She is agreeable to a course of budesonide remission..  If she has persistent ileitis can consider transition to a biologic treatment at that time, however if her ileal aphthous ulcers are resolved she could continue colonoscopy for surveillance off treatment.    - Start budesonide course as prescribed  - Obtain laboratory work today including CBC and iron panel.    - Your next colonoscopy is due in 6 to 12 months    Health maintenance:  - Yearly flu shot. Avoid live vaccines  - Pneumovax and Prevnar 13 every 5 years  - Pap smear per GYN  - Yearly skin exam         Orders:    budesonide (ENTOCORT EC) 3 MG capsule; Take 3 capsules (9 mg total) by mouth daily for 56 days, THEN 2 capsules (6 mg total) daily for 14 days, THEN 1 capsule (3 mg total) daily for 7 days.

## 2025-03-22 ENCOUNTER — HOSPITAL ENCOUNTER (OUTPATIENT)
Dept: MAMMOGRAPHY | Facility: MEDICAL CENTER | Age: 60
Discharge: HOME | End: 2025-03-22
Payer: COMMERCIAL

## 2025-03-22 VITALS — BODY MASS INDEX: 29.41 KG/M2 | WEIGHT: 166 LBS | HEIGHT: 63 IN

## 2025-03-22 DIAGNOSIS — Z12.31 ENCOUNTER FOR SCREENING MAMMOGRAM FOR MALIGNANT NEOPLASM OF BREAST: ICD-10-CM

## 2025-03-22 PROCEDURE — 77063 BREAST TOMOSYNTHESIS BI: CPT

## 2025-03-22 PROCEDURE — 77067 SCR MAMMO BI INCL CAD: CPT

## 2025-03-26 ENCOUNTER — TELEPHONE (OUTPATIENT)
Age: 60
End: 2025-03-26

## 2025-03-26 NOTE — TELEPHONE ENCOUNTER
Patient is planning to bring FMLA paperwork into office, asked what office hours are-patient is going to attach a note to the paperwork for Dr. Jaiyeola and will send her additional concerns/questions to Dr. Jaiyeola via a message in [employer]

## 2025-03-26 NOTE — TELEPHONE ENCOUNTER
Patients GI provider:  Dr. Jaiyeola    Number to return call: (: [phone]     Reason for call: Pt calling requesting to speak with the office only, called the office transferred the call to Yesi to assist.     Scheduled procedure/appointment date if applicable: Appt 6/17/25

## 2025-04-01 ENCOUNTER — OFFICE VISIT (OUTPATIENT)
Dept: FAMILY MEDICINE CLINIC | Facility: CLINIC | Age: 60
End: 2025-04-01

## 2025-04-01 VITALS
HEART RATE: 73 BPM | OXYGEN SATURATION: 99 % | SYSTOLIC BLOOD PRESSURE: 140 MMHG | TEMPERATURE: 98 F | WEIGHT: 169 LBS | DIASTOLIC BLOOD PRESSURE: 70 MMHG | BODY MASS INDEX: 29.95 KG/M2 | RESPIRATION RATE: 16 BRPM | HEIGHT: 63 IN

## 2025-04-01 DIAGNOSIS — E53.8 VITAMIN B12 DEFICIENCY: ICD-10-CM

## 2025-04-01 DIAGNOSIS — L20.82 FLEXURAL ECZEMA: ICD-10-CM

## 2025-04-01 DIAGNOSIS — Z23 ENCOUNTER FOR IMMUNIZATION: ICD-10-CM

## 2025-04-01 DIAGNOSIS — R73.03 PREDIABETES: ICD-10-CM

## 2025-04-01 DIAGNOSIS — K50.00 CROHN'S DISEASE OF SMALL INTESTINE WITHOUT COMPLICATION (HCC): ICD-10-CM

## 2025-04-01 DIAGNOSIS — I10 PRIMARY HYPERTENSION: ICD-10-CM

## 2025-04-01 DIAGNOSIS — E61.1 IRON DEFICIENCY: ICD-10-CM

## 2025-04-01 DIAGNOSIS — Z00.00 ANNUAL PHYSICAL EXAM: Primary | ICD-10-CM

## 2025-04-01 DIAGNOSIS — E53.8 B12 DEFICIENCY: ICD-10-CM

## 2025-04-01 DIAGNOSIS — J45.20 MILD INTERMITTENT ASTHMA WITHOUT COMPLICATION: ICD-10-CM

## 2025-04-01 PROBLEM — R42 DIZZINESS: Status: RESOLVED | Noted: 2023-11-13 | Resolved: 2025-04-01

## 2025-04-01 PROCEDURE — 99214 OFFICE O/P EST MOD 30 MIN: CPT | Performed by: FAMILY MEDICINE

## 2025-04-01 PROCEDURE — 90750 HZV VACC RECOMBINANT IM: CPT

## 2025-04-01 PROCEDURE — 90471 IMMUNIZATION ADMIN: CPT

## 2025-04-01 PROCEDURE — 99396 PREV VISIT EST AGE 40-64: CPT | Performed by: FAMILY MEDICINE

## 2025-04-01 RX ORDER — TRIAMCINOLONE ACETONIDE 1 MG/G
OINTMENT TOPICAL 2 TIMES DAILY PRN
Qty: 30 G | Refills: 1 | Status: SHIPPED | OUTPATIENT
Start: 2025-04-01

## 2025-04-01 RX ORDER — FERROUS SULFATE 324(65)MG
324 TABLET, DELAYED RELEASE (ENTERIC COATED) ORAL EVERY OTHER DAY
Qty: 30 TABLET | Refills: 3 | Status: SHIPPED | OUTPATIENT
Start: 2025-04-01

## 2025-04-01 RX ORDER — ALBUTEROL SULFATE 90 UG/1
2 INHALANT RESPIRATORY (INHALATION) EVERY 6 HOURS PRN
Qty: 18 G | Refills: 2 | Status: SHIPPED | OUTPATIENT
Start: 2025-04-01

## 2025-04-01 RX ORDER — AMLODIPINE BESYLATE 5 MG/1
5 TABLET ORAL DAILY
Qty: 90 TABLET | Refills: 2 | Status: SHIPPED | OUTPATIENT
Start: 2025-04-01

## 2025-04-01 NOTE — ASSESSMENT & PLAN NOTE
Stable  Continue albuterol as needed  Orders:    albuterol (Ventolin HFA) 90 mcg/act inhaler; Inhale 2 puffs every 6 (six) hours as needed for wheezing or shortness of breath

## 2025-04-01 NOTE — PATIENT INSTRUCTIONS
"Patient Education     Routine physical for adults   The Basics   Written by the doctors and editors at [facility]   What is a physical? -- A physical is a routine visit, or \"check-up,\" with your doctor. You might also hear it called a \"wellness visit\" or \"preventive visit.\"  During each visit, the doctor will:   Ask about your physical and mental health   Ask about your habits, behaviors, and lifestyle   Do an exam   Give you vaccines if needed   Talk to you about any medicines you take   Give advice about your health   Answer your questions  Getting regular check-ups is an important part of taking care of your health. It can help your doctor find and treat any problems you have. But it's also important for preventing health problems.  A routine physical is different from a \"sick visit.\" A sick visit is when you see a doctor because of a health concern or problem. Since physicals are scheduled ahead of time, you can think about what you want to ask the doctor.  How often should I get a physical? -- It depends on your age and health. In general, for people age 21 years and older:   If you are younger than 50 years, you might be able to get a physical every 3 years.   If you are 50 years or older, your doctor might recommend a physical every year.  If you have an ongoing health condition, like diabetes or high blood pressure, your doctor will probably want to see you more often.  What happens during a physical? -- In general, each visit will include:   Physical exam - The doctor or nurse will check your height, weight, heart rate, and blood pressure. They will also look at your eyes and ears. They will ask about how you are feeling and whether you have any symptoms that bother you.   Medicines - It's a good idea to bring a list of all the medicines you take to each doctor visit. Your doctor will talk to you about your medicines and answer any questions. Tell them if you are having any side effects that bother you. You " "should also tell them if you are having trouble paying for any of your medicines.   Habits and behaviors - This includes:   Your diet   Your exercise habits   Whether you smoke, drink alcohol, or use drugs   Whether you are sexually active   Whether you feel safe at home  Your doctor will talk to you about things you can do to improve your health and lower your risk of health problems. They will also offer help and support. For example, if you want to quit smoking, they can give you advice and might prescribe medicines. If you want to improve your diet or get more physical activity, they can help you with this, too.   Lab tests, if needed - The tests you get will depend on your age and situation. For example, your doctor might want to check your:   Cholesterol   Blood sugar   Iron level   Vaccines - The recommended vaccines will depend on your age, health, and what vaccines you already had. Vaccines are very important because they can prevent certain serious or deadly infections.   Discussion of screening - \"Screening\" means checking for diseases or other health problems before they cause symptoms. Your doctor can recommend screening based on your age, risk, and preferences. This might include tests to check for:   Cancer, such as breast, prostate, cervical, ovarian, colorectal, prostate, lung, or skin cancer   Sexually transmitted infections, such as chlamydia and gonorrhea   Mental health conditions like depression and anxiety  Your doctor will talk to you about the different types of screening tests. They can help you decide which screenings to have. They can also explain what the results might mean.   Answering questions - The physical is a good time to ask the doctor or nurse questions about your health. If needed, they can refer you to other doctors or specialists, too.  Adults older than 65 years often need other care, too. As you get older, your doctor will talk to you about:   How to prevent falling at " home   Hearing or vision tests   Memory testing   How to take your medicines safely   Making sure that you have the help and support you need at home  All topics are updated as new evidence becomes available and our peer review process is complete.  This topic retrieved from [employer] on: May 02, 2024.  Topic 289063 Version 1.0  Release: 32.4.3 - C32.122  © 2024 UpToDate, Inc. and/or its affiliates. All rights reserved.  Consumer Information Use and Disclaimer   Disclaimer: This generalized information is a limited summary of diagnosis, treatment, and/or medication information. It is not meant to be comprehensive and should be used as a tool to help the user understand and/or assess potential diagnostic and treatment options. It does NOT include all information about conditions, treatments, medications, side effects, or risks that may apply to a specific patient. It is not intended to be medical advice or a substitute for the medical advice, diagnosis, or treatment of a health care provider based on the health care provider's examination and assessment of a patient's specific and unique circumstances. Patients must speak with a health care provider for complete information about their health, medical questions, and treatment options, including any risks or benefits regarding use of medications. This information does not endorse any treatments or medications as safe, effective, or approved for treating a specific patient. UpToDate, Inc. and its affiliates disclaim any warranty or liability relating to this information or the use thereof.The use of this information is governed by the Terms of Use, available at https://www.woltersAscentisuwer.com/en/know/clinical-effectiveness-terms. 2024© UpToDate, Inc. and its affiliates and/or licensors. All rights reserved.  Copyright   © 2024 UpToDate, Inc. and/or its affiliates. All rights reserved.

## 2025-04-01 NOTE — ASSESSMENT & PLAN NOTE
Orders:    triamcinolone (KENALOG) 0.1 % ointment; Apply topically 2 (two) times a day as needed for rash

## 2025-04-01 NOTE — ASSESSMENT & PLAN NOTE
Slightly elevated blood pressure  Continue amlodipine at 5 mg daily  Recommend ambulatory blood pressure monitoring  Recommend DASH diet  Bring blood pressure log next visit  Orders:    Lipid panel; Future    amLODIPine (NORVASC) 5 mg tablet; Take 1 tablet (5 mg total) by mouth daily

## 2025-04-01 NOTE — ASSESSMENT & PLAN NOTE
Last hemoglobin A1c of 6.3  Low carbohydrate dietary counseling provided  Recommend resistance training exercise 5 days a week  Recheck hemoglobin A1c  Orders:    Hemoglobin A1C; Future

## 2025-04-01 NOTE — ASSESSMENT & PLAN NOTE
Orders:    cyanocobalamin (VITAMIN B-12) 1000 MCG tablet; Take 1 tablet (1,000 mcg total) by mouth daily

## 2025-04-01 NOTE — ASSESSMENT & PLAN NOTE
Likely due to mild course disease as evidenced by single aphthous ulcer in terminal ileum from colonoscopy  Continue steroid treatment per GI  Follow-up with GI outpatient

## 2025-04-01 NOTE — PROGRESS NOTES
Adult Annual Physical  Name: Ila Oshea      : 1965      MRN: [id number]  Encounter Provider: Branden Patel MD  Encounter Date: 2025   Encounter department: [facility] [place]    Assessment & Plan  Prediabetes  Last hemoglobin A1c of 6.3  Low carbohydrate dietary counseling provided  Recommend resistance training exercise 5 days a week  Recheck hemoglobin A1c  Orders:    Hemoglobin A1C; Future    Primary hypertension  Slightly elevated blood pressure  Continue amlodipine at 5 mg daily  Recommend ambulatory blood pressure monitoring  Recommend DASH diet  Bring blood pressure log next visit  Orders:    Lipid panel; Future    amLODIPine (NORVASC) 5 mg tablet; Take 1 tablet (5 mg total) by mouth daily    Annual physical exam         Mild intermittent asthma without complication  Stable  Continue albuterol as needed  Orders:    albuterol (Ventolin HFA) 90 mcg/act inhaler; Inhale 2 puffs every 6 (six) hours as needed for wheezing or shortness of breath    B12 deficiency    Orders:    Vitamin B12; Future    Iron deficiency    Orders:    ferrous sulfate 324 (65 Fe) mg; Take 1 tablet (324 mg total) by mouth every other day    Flexural eczema    Orders:    triamcinolone (KENALOG) 0.1 % ointment; Apply topically 2 (two) times a day as needed for rash    Encounter for immunization    Orders:    Zoster Vaccine Recombinant IM    Vitamin B12 deficiency    Orders:    cyanocobalamin (VITAMIN B-12) 1000 MCG tablet; Take 1 tablet (1,000 mcg total) by mouth daily    Crohn's disease of small intestine without complication (HCC)  Likely due to mild course disease as evidenced by single aphthous ulcer in terminal ileum from colonoscopy  Continue steroid treatment per GI  Follow-up with GI outpatient       Preventive Screenings:  - Diabetes Screening: screening up-to-date  - Cholesterol Screening: risks/benefits discussed and orders placed   - Hepatitis C screening:  screening up-to-date   - HIV screening: screening up-to-date   - Cervical cancer screening: screening up-to-date   - Breast cancer screening: screening up-to-date   - Colon cancer screening: screening up-to-date   - Lung cancer screening: screening not indicated     Immunizations:  - Immunizations due: Zoster (Shingrix)    Counseling/Anticipatory Guidance:    - Diet: discussed recommendations for a healthy/well-balanced diet.   - Exercise: the importance of regular exercise/physical activity was discussed. Recommend exercise 3-5 times per week for at least 30 minutes.          History of Present Illness     Adult Annual Physical:  Patient presents for annual physical.     Diet and Physical Activity:  - Diet/Nutrition: no special diet.  - Exercise: no formal exercise.    General Health:  - Sleep: sleeps well and 7-8 hours of sleep on average.  - Hearing: normal hearing right ear and normal hearing left ear.  - Vision: vision problems.  - Dental: regular dental visits and brushes teeth twice daily.    /GYN Health:  - Follows with GYN: yes.   - History of STDs: no    Advanced Care Planning:  - Has an advanced directive?: no    - Has a durable medical POA?: no    - ACP document given to patient?: no      Review of Systems   Constitutional:  Negative for chills, diaphoresis, fatigue and fever.   HENT:  Negative for congestion, postnasal drip and rhinorrhea.    Eyes:  Negative for visual disturbance.   Respiratory:  Negative for cough, shortness of breath and wheezing.    Cardiovascular:  Negative for chest pain, palpitations and leg swelling.   Gastrointestinal:  Negative for abdominal pain, blood in stool, constipation, diarrhea, nausea and vomiting.   Genitourinary:  Negative for dysuria, hematuria and urgency.   Musculoskeletal:  Negative for arthralgias and gait problem.   Skin:  Negative for rash.   Neurological:  Negative for syncope, weakness, numbness and headaches.   Hematological:  Negative for adenopathy.  "  Psychiatric/Behavioral:  Negative for agitation.          Objective   /70 (BP Location: Left arm, Patient Position: Sitting, Cuff Size: Standard)   Pulse 73   Temp 98 °F (36.7 °C) (Temporal)   Resp 16   Ht 5' 3\" (1.6 m)   Wt 76.7 kg (169 lb)   SpO2 99%   BMI 29.94 kg/m²     Physical Exam  Constitutional:       General: She is not in acute distress.     Appearance: Normal appearance. She is well-developed. She is not ill-appearing, toxic-appearing or diaphoretic.   HENT:      Head: Normocephalic and atraumatic.      Right Ear: Tympanic membrane, ear canal and external ear normal. There is no impacted cerumen.      Left Ear: Tympanic membrane, ear canal and external ear normal. There is no impacted cerumen.      Nose: Nose normal.      Mouth/Throat:      Mouth: Mucous membranes are moist.      Pharynx: No oropharyngeal exudate or posterior oropharyngeal erythema.   Eyes:      General: No scleral icterus.        Right eye: No discharge.         Left eye: No discharge.      Extraocular Movements: Extraocular movements intact.      Pupils: Pupils are equal, round, and reactive to light.   Cardiovascular:      Rate and Rhythm: Normal rate and regular rhythm.      Heart sounds: Normal heart sounds. No murmur heard.     No friction rub. No gallop.   Pulmonary:      Effort: Pulmonary effort is normal. No respiratory distress.      Breath sounds: Normal breath sounds. No stridor. No wheezing or rhonchi.   Abdominal:      General: Bowel sounds are normal. There is no distension.      Palpations: Abdomen is soft. There is no mass.      Tenderness: There is no abdominal tenderness. There is no guarding.   Musculoskeletal:         General: Normal range of motion.      Cervical back: Normal range of motion.      Right lower leg: No edema.      Left lower leg: No edema.   Skin:     General: Skin is warm.      Capillary Refill: Capillary refill takes less than 2 seconds.   Neurological:      General: No focal deficit " present.      Mental Status: She is alert and oriented to person, place, and time.      Cranial Nerves: No cranial nerve deficit.      Motor: No weakness.      Gait: Gait normal.   Psychiatric:         Mood and Affect: Mood normal.         Behavior: Behavior normal.

## 2025-04-04 NOTE — TELEPHONE ENCOUNTER
Was warm transferred a call from the patient. The patient informed us that she last saw Dr. Jaiyeola in March (OV 3/4/25) and they discussed her Crohn's diagnosis, and she informed the provider of [facility] paperwork that she will need filled out/ submitted for work. Patient had to wait until 1 year with her employer, 4/1/25, which she indicated at the office visit. She has submitted for this paperwork which will take a few days until it is accessible to her to provide the office. The patient has done this and expects to have it Monday or early next week. The patient prefers to drop it off in person to explain to the clinical team exactly what she needs, and was provided with the available hours to do so as well as the fax number for her work to get the form to the office. Patient will call the office when she knows if it will be faxed or dropped off so we know when to expect it and keep in communication.     Patient also notes that the paperwork will need to be dated to start from her 1 year rangel of 4/1/25 and would like Dr. Jaiyeola to know that with her Crohn's episodes she misses 2-3 days a week, up to 3. Informed the patient I will let the provider know and keep in contact, and the patient thanked us.

## 2025-04-06 DIAGNOSIS — K21.9 GASTROESOPHAGEAL REFLUX DISEASE, UNSPECIFIED WHETHER ESOPHAGITIS PRESENT: ICD-10-CM

## 2025-04-07 RX ORDER — OMEPRAZOLE 20 MG/1
20 CAPSULE, DELAYED RELEASE ORAL DAILY
Qty: 30 CAPSULE | Refills: 5 | Status: SHIPPED | OUTPATIENT
Start: 2025-04-07

## 2025-04-08 ENCOUNTER — TELEPHONE (OUTPATIENT)
Dept: GASTROENTEROLOGY | Facility: MEDICAL CENTER | Age: 60
End: 2025-04-08

## 2025-04-08 ENCOUNTER — APPOINTMENT (OUTPATIENT)
Dept: LAB | Facility: MEDICAL CENTER | Age: 60
End: 2025-04-08
Payer: COMMERCIAL

## 2025-04-08 DIAGNOSIS — E53.8 B12 DEFICIENCY: ICD-10-CM

## 2025-04-08 DIAGNOSIS — I10 PRIMARY HYPERTENSION: ICD-10-CM

## 2025-04-08 DIAGNOSIS — R73.03 PREDIABETES: ICD-10-CM

## 2025-04-08 DIAGNOSIS — E61.1 IRON DEFICIENCY: ICD-10-CM

## 2025-04-08 LAB
BASOPHILS # BLD AUTO: 0.02 THOUSANDS/ÂΜL (ref 0–0.1)
BASOPHILS NFR BLD AUTO: 0 % (ref 0–1)
CHOLEST SERPL-MCNC: 205 MG/DL (ref ?–200)
EOSINOPHIL # BLD AUTO: 0.05 THOUSAND/ÂΜL (ref 0–0.61)
EOSINOPHIL NFR BLD AUTO: 1 % (ref 0–6)
ERYTHROCYTE [DISTWIDTH] IN BLOOD BY AUTOMATED COUNT: 14.9 % (ref 11.6–15.1)
FERRITIN SERPL-MCNC: 61 NG/ML (ref 30–307)
HCT VFR BLD AUTO: 44.1 % (ref 34.8–46.1)
HDLC SERPL-MCNC: 67 MG/DL
HGB BLD-MCNC: 13.6 G/DL (ref 11.5–15.4)
IMM GRANULOCYTES # BLD AUTO: 0.03 THOUSAND/UL (ref 0–0.2)
IMM GRANULOCYTES NFR BLD AUTO: 0 % (ref 0–2)
IRON SATN MFR SERPL: 36 % (ref 15–50)
IRON SERPL-MCNC: 148 UG/DL (ref 50–212)
LDLC SERPL CALC-MCNC: 113 MG/DL (ref 0–100)
LYMPHOCYTES # BLD AUTO: 2.63 THOUSANDS/ÂΜL (ref 0.6–4.47)
LYMPHOCYTES NFR BLD AUTO: 30 % (ref 14–44)
MCH RBC QN AUTO: 26.3 PG (ref 26.8–34.3)
MCHC RBC AUTO-ENTMCNC: 30.8 G/DL (ref 31.4–37.4)
MCV RBC AUTO: 85 FL (ref 82–98)
MONOCYTES # BLD AUTO: 0.51 THOUSAND/ÂΜL (ref 0.17–1.22)
MONOCYTES NFR BLD AUTO: 6 % (ref 4–12)
NEUTROPHILS # BLD AUTO: 5.45 THOUSANDS/ÂΜL (ref 1.85–7.62)
NEUTS SEG NFR BLD AUTO: 63 % (ref 43–75)
NONHDLC SERPL-MCNC: 138 MG/DL
NRBC BLD AUTO-RTO: 0 /100 WBCS
PLATELET # BLD AUTO: 381 THOUSANDS/UL (ref 149–390)
PMV BLD AUTO: 10.8 FL (ref 8.9–12.7)
RBC # BLD AUTO: 5.18 MILLION/UL (ref 3.81–5.12)
TIBC SERPL-MCNC: 415.8 UG/DL (ref 250–450)
TRANSFERRIN SERPL-MCNC: 297 MG/DL (ref 203–362)
TRIGL SERPL-MCNC: 124 MG/DL (ref ?–150)
UIBC SERPL-MCNC: 268 UG/DL (ref 155–355)
VIT B12 SERPL-MCNC: 941 PG/ML (ref 180–914)
WBC # BLD AUTO: 8.69 THOUSAND/UL (ref 4.31–10.16)

## 2025-04-08 PROCEDURE — 80061 LIPID PANEL: CPT

## 2025-04-08 PROCEDURE — 83550 IRON BINDING TEST: CPT

## 2025-04-08 PROCEDURE — 36415 COLL VENOUS BLD VENIPUNCTURE: CPT

## 2025-04-08 PROCEDURE — 83036 HEMOGLOBIN GLYCOSYLATED A1C: CPT

## 2025-04-08 PROCEDURE — 82728 ASSAY OF FERRITIN: CPT

## 2025-04-08 PROCEDURE — 82607 VITAMIN B-12: CPT

## 2025-04-08 PROCEDURE — 83540 ASSAY OF IRON: CPT

## 2025-04-08 PROCEDURE — 85025 COMPLETE CBC W/AUTO DIFF WBC: CPT

## 2025-04-09 ENCOUNTER — RESULTS FOLLOW-UP (OUTPATIENT)
Dept: GASTROENTEROLOGY | Facility: MEDICAL CENTER | Age: 60
End: 2025-04-09

## 2025-04-09 LAB
EST. AVERAGE GLUCOSE BLD GHB EST-MCNC: 137 MG/DL
HBA1C MFR BLD: 6.4 %

## 2025-04-10 ENCOUNTER — TELEPHONE (OUTPATIENT)
Dept: GASTROENTEROLOGY | Facility: MEDICAL CENTER | Age: 60
End: 2025-04-10

## 2025-04-10 NOTE — TELEPHONE ENCOUNTER
Called the patient and left a VM to let them know I have received their FMLA forms and that Dr. Jaiyeola will be in the office next week on Tuesday 4/15/25 and that I will then give her the forms. Stated that after she has reviewed I will fax them to the number provided. Provided office number for any questions. Thank you!

## 2025-04-10 NOTE — TELEPHONE ENCOUNTER
----- Message from Anshul CUTLER sent at 4/8/2025  3:06 PM EDT -----  PT came into office today and dropped off FMLA paper work to filled out from the provider. She wanted me to send you a message making you aware. It is currently scanned into the Pts chart for you to fill out we noticed that you wont be in [place] office till next week. She ask that I include some notes as well for you the Frequency she is requesting is 2 to 3 days out of the week, she asked if we can include all office appointments , and that the paper work is due April 23rd and asked if she could be called when all paper work is submitted  and complete.

## 2025-04-15 NOTE — TELEPHONE ENCOUNTER
Faxed the signed FMLA forms to the fax number provided by [employer] and received a confirmation that our fax did go through.    Called the patient and left a VM indicating that I did send the fax and to call us with any additional questions or needs. Having the fax scanned into the chart, sent to [facility]. Thank you!

## 2025-04-17 ENCOUNTER — RESULTS FOLLOW-UP (OUTPATIENT)
Dept: FAMILY MEDICINE CLINIC | Facility: CLINIC | Age: 60
End: 2025-04-17

## 2025-05-16 ENCOUNTER — OFFICE VISIT (OUTPATIENT)
Dept: FAMILY MEDICINE CLINIC | Facility: CLINIC | Age: 60
End: 2025-05-16

## 2025-05-16 VITALS
TEMPERATURE: 98 F | HEIGHT: 63 IN | BODY MASS INDEX: 28.53 KG/M2 | SYSTOLIC BLOOD PRESSURE: 138 MMHG | OXYGEN SATURATION: 100 % | HEART RATE: 89 BPM | RESPIRATION RATE: 16 BRPM | DIASTOLIC BLOOD PRESSURE: 78 MMHG | WEIGHT: 161 LBS

## 2025-05-16 DIAGNOSIS — R10.9 ABDOMINAL PAIN, UNSPECIFIED ABDOMINAL LOCATION: Primary | ICD-10-CM

## 2025-05-16 DIAGNOSIS — K50.00 CROHN'S DISEASE OF SMALL INTESTINE WITHOUT COMPLICATION (HCC): ICD-10-CM

## 2025-05-16 PROCEDURE — 99213 OFFICE O/P EST LOW 20 MIN: CPT | Performed by: FAMILY MEDICINE

## 2025-05-16 RX ORDER — SUCRALFATE 1 G/1
1 TABLET ORAL 4 TIMES DAILY
Qty: 120 TABLET | Refills: 0 | Status: SHIPPED | OUTPATIENT
Start: 2025-05-16

## 2025-05-16 NOTE — PROGRESS NOTES
Name: Ila Oshea      : 1965      MRN: [id number]  Encounter Provider: Aniyah Valdes MD  Encounter Date: 2025   Encounter department: [facility] [place]  :  Assessment & Plan  Abdominal pain, unspecified abdominal location  Generalized abdominal pain which patient describes as aching and sometimes burning.  Of note patient with history of chron's disease being follwd by GI.  She is currently on a taper budesonide dose    DDX: viral gastroenteritis vs . Titration dosage side effect vs, exacerbation of chron's disease    -Continue established treatment by GI  -Sent epic chat message to GI for any recommendations suggested due to patient's history and current presentation  -Patient has f/u with GI next month   -Start carafate 1 g QID   -ED precaustions    Orders:    sucralfate (CARAFATE) 1 g tablet; Take 1 tablet (1 g total) by mouth 4 (four) times a day    Crohn's disease of small intestine without complication (HCC)  Continue following with GI  Home meds: Budesonide take 3 capsules (9 mg total) by mouth daily for 56 days, THEN 2 capsules (6 mg total) daily for 14 days, THEN 1 capsule (3 mg total) daily for 7 days. She currently started the 6 mg taper this week     -Next F/U w/GI 25                History of Present Illness   Abdominal Pain  This is a recurrent problem. The current episode started in the past 7 days. The onset quality is gradual. The problem occurs intermittently. The problem has been gradually improving. The pain is located in the generalized abdominal region. The pain is at a severity of 4/10. The pain is moderate. The quality of the pain is aching and burning. The abdominal pain does not radiate. Associated symptoms include nausea. Pertinent negatives include no arthralgias, dysuria, fever, hematuria or vomiting. Nothing aggravates the pain. The pain is relieved by Bowel movements. She has tried proton pump inhibitors and  "antacids for the symptoms. The treatment provided mild relief. Her past medical history is significant for Crohn's disease.     Review of Systems   Constitutional:  Negative for chills and fever.   HENT:  Negative for ear pain and sore throat.    Eyes:  Negative for pain and visual disturbance.   Respiratory:  Negative for cough and shortness of breath.    Cardiovascular:  Negative for chest pain and palpitations.   Gastrointestinal:  Positive for abdominal pain and nausea. Negative for vomiting.   Genitourinary:  Negative for dysuria and hematuria.   Musculoskeletal:  Negative for arthralgias and back pain.   Skin:  Negative for color change and rash.   Neurological:  Negative for seizures and syncope.   All other systems reviewed and are negative.      Objective   /78 (BP Location: Right arm, Patient Position: Sitting, Cuff Size: Standard)   Pulse 89   Temp 98 °F (36.7 °C) (Temporal)   Resp 16   Ht 5' 3\" (1.6 m)   Wt 73 kg (161 lb)   SpO2 100%   BMI 28.52 kg/m²      Physical Exam  Vitals and nursing note reviewed.   Constitutional:       General: She is not in acute distress.     Appearance: She is well-developed.   HENT:      Head: Normocephalic and atraumatic.     Eyes:      Conjunctiva/sclera: Conjunctivae normal.       Cardiovascular:      Rate and Rhythm: Normal rate and regular rhythm.      Heart sounds: No murmur heard.  Pulmonary:      Effort: Pulmonary effort is normal. No respiratory distress.      Breath sounds: Normal breath sounds.   Abdominal:      General: Abdomen is flat. Bowel sounds are normal. There is no distension or abdominal bruit. There are no signs of injury.      Palpations: Abdomen is soft.      Tenderness: There is generalized abdominal tenderness.      Comments: Slight tenderness upon palpation. BS present, abdomen soft throughout      Musculoskeletal:         General: No swelling.      Cervical back: Neck supple.     Skin:     General: Skin is warm and dry.      Capillary " Refill: Capillary refill takes less than 2 seconds.     Neurological:      Mental Status: She is alert.     Psychiatric:         Mood and Affect: Mood normal.

## 2025-05-16 NOTE — ASSESSMENT & PLAN NOTE
Continue following with GI  Home meds: Budesonide take 3 capsules (9 mg total) by mouth daily for 56 days, THEN 2 capsules (6 mg total) daily for 14 days, THEN 1 capsule (3 mg total) daily for 7 days. She currently started the 6 mg taper this week     -Next F/U w/GI 6/17/25

## 2025-05-18 DIAGNOSIS — K50.00 CROHN'S DISEASE OF SMALL INTESTINE WITHOUT COMPLICATION (HCC): ICD-10-CM

## 2025-05-18 DIAGNOSIS — K50.00 TERMINAL ILEITIS WITHOUT COMPLICATION (HCC): ICD-10-CM

## 2025-05-19 RX ORDER — BUDESONIDE 3 MG/1
CAPSULE, COATED PELLETS ORAL
Qty: 203 CAPSULE | Refills: 0 | Status: SHIPPED | OUTPATIENT
Start: 2025-05-19 | End: 2025-05-20

## 2025-05-20 ENCOUNTER — TELEPHONE (OUTPATIENT)
Dept: GASTROENTEROLOGY | Facility: MEDICAL CENTER | Age: 60
End: 2025-05-20

## 2025-05-20 ENCOUNTER — TELEPHONE (OUTPATIENT)
Age: 60
End: 2025-05-20

## 2025-05-20 NOTE — TELEPHONE ENCOUNTER
Pt call the office was concern that a new medication order by Dunia GUERRA    budesonide she was supposedly being tapering down not sure why this is being refill should she continue taking this   please advise

## 2025-05-20 NOTE — TELEPHONE ENCOUNTER
Patient contacted office with a medication concern. Call transferred to office as per patient request.

## 2025-05-23 NOTE — PROGRESS NOTES
[facility]  Annual Well Adult Visit      Assessment/Plan   Prediabetes  · A1C-5 7  · Continue to monitor  · Dietary habits and exercise discussed  · Will check A1C at next visit for monitoring    Healthcare maintenance  · Discussed risks/benefits COVID vaccine; still contemplating at this time  · Cervical Cancer screening UTD-negative PAP  · Colonoscopy UTD  · Mammogram scheduled for September  · Discussed Pneumococcal Vaccination in the setting of Asthma-will consider      1  Healthy female exam   2  Patient Counseling:   · Nutrition: Stressed importance of a well balanced diet, moderation of sodium/saturated fat, caloric balance and sufficient intake of fiber  · Exercise: Stressed the importance of regular exercise with a goal of 150 minutes per week  · Dental Health: Discussed daily flossing and brushing and regular dental visits   · Alcohol Use:  Recommended moderation of alcohol intake  · Immunizations reviewed  COVID, Flu, Pneumococcal  · Discussed benefits of screening yes  · Discussed the patient's BMI with her  The BMI is above average; BMI management plan is completed  3  Cancer Screening UTD  4  Labs None now  5  Follow up in 3 months  Subjective     Link Pack is a 65 y o  Female with a significant PMHx of Gastroenteritis, Asthma, Prediabetes and is here for routine health maintenance  The patient reports no problems  History of Present Illness   Well Adult Physical   Patient here for a comprehensive physical exam       Diet and Physical Activity  Diet: well balanced diet  Weight concerns: Patient has class 1 obesity (BMI 30-34  9)  Exercise: frequently      Depression Screen  PHQ-2/9 Depression Screening    Little interest or pleasure in doing things: 0 - not at all  Feeling down, depressed, or hopeless: 0 - not at all  PHQ-2 Score: 0  PHQ-2 Interpretation: Negative depression screen          General Health   Hearing: Normal:  bilateral  Vision: no Pt called and advised   vision problems  Dental: regular dental visits     History:  LMP: No LMP recorded  Patient is postmenopausal     Cancer Screening  Colononoscopy UTD-repeat screening in 2030  Mammogram scheduled for September   Pap UTD  Abnormal pap? no  Smoker DENIES Annual screening with low-dose helical computed tomography (CT) for patients age 54 to 76 years with history of smoking at least 30 pack-years and, if a former smoker, had quit within the previous 15 years      The following portions of the patient's history were reviewed and updated as appropriate: allergies, current medications, past family history, past medical history, past social history, past surgical history and problem list     Review of Systems     Review of Systems   Constitutional: Negative for chills, fatigue and fever  HENT: Negative for sore throat  Respiratory: Negative for cough, chest tightness and shortness of breath  Cardiovascular: Negative for chest pain and leg swelling  Gastrointestinal: Negative for constipation, diarrhea, nausea and vomiting  Endocrine: Negative for polydipsia, polyphagia and polyuria  Genitourinary: Negative for dysuria  Musculoskeletal: Negative for arthralgias  Skin: Negative for rash  Neurological: Negative for dizziness, light-headedness and headaches  Psychiatric/Behavioral: Negative for agitation and behavioral problems         Past Medical History     Past Medical History:   Diagnosis Date    Asthma     GERD (gastroesophageal reflux disease)     Prediabetes     Vitamin D deficiency 2017     Past Surgical History     Past Surgical History:   Procedure Laterality Date    NO PAST SURGERIES         Social History     Social History     Socioeconomic History    Marital status: /Civil Union     Spouse name: None    Number of children: None    Years of education: None    Highest education level: None   Occupational History    None   Tobacco Use    Smoking status: Never Smoker    Smokeless tobacco: Never Used   Vaping Use    Vaping Use: Never used   Substance and Sexual Activity    Alcohol use: Yes    Drug use: Never    Sexual activity: Yes     Partners: Male     Birth control/protection: Post-menopausal   Other Topics Concern    None   Social History Narrative    No preference on Jewish beliefs      Social Determinants of Health     Financial Resource Strain: Low Risk     Difficulty of Paying Living Expenses: Not hard at all   Food Insecurity: No Food Insecurity    Worried About Running Out of Food in the Last Year: Never true    Barb of Food in the Last Year: Never true   Transportation Needs: No Transportation Needs    Lack of Transportation (Medical): No    Lack of Transportation (Non-Medical):  No   Physical Activity: Not on file   Stress: Not on file   Social Connections: Not on file   Intimate Partner Violence: Not on file   Housing Stability: Not on file       Family History     Family History   Problem Relation Age of Onset    No Known Problems Mother     No Known Problems Father     Dementia Maternal Grandmother         Without behavioral disturbance, unspecified dementia type     Heart failure Maternal Grandmother     Hypertension Maternal Grandmother     Diabetes type II Maternal Grandmother     Breast cancer Maternal Aunt         62    No Known Problems Daughter     No Known Problems Paternal Grandfather     Breast cancer Maternal Aunt         62s    No Known Problems Maternal Aunt     Colon cancer Neg Hx     Ovarian cancer Neg Hx        Current Medications       Current Outpatient Medications:     albuterol (Ventolin HFA) 90 mcg/act inhaler, Inhale 2 puffs every 6 (six) hours as needed for wheezing, Disp: 18 g, Rfl: 5    famotidine (PEPCID) 20 mg tablet, Take 1 tablet (20 mg total) by mouth 2 (two) times a day, Disp: 90 tablet, Rfl: 1    lidocaine (Lidoderm) 5 %, Apply 1 patch topically daily Remove & Discard patch within 12 hours or as directed by MD, Disp: 15 patch, Rfl: 1     Allergies     No Known Allergies    Objective     /80 (BP Location: Left arm, Patient Position: Sitting, Cuff Size: Adult)   Pulse 88   Temp 97 7 °F (36 5 °C) (Temporal)   Resp 18   Ht 5' 1" (1 549 m)   Wt 73 3 kg (161 lb 11 2 oz)   SpO2 98%   BMI 30 55 kg/m²      Physical Exam  Constitutional:       Appearance: She is well-developed  HENT:      Head: Normocephalic and atraumatic  Eyes:      Pupils: Pupils are equal, round, and reactive to light  Cardiovascular:      Rate and Rhythm: Normal rate and regular rhythm  Heart sounds: Normal heart sounds  Pulmonary:      Effort: Pulmonary effort is normal       Breath sounds: Normal breath sounds  Abdominal:      General: Bowel sounds are normal       Palpations: Abdomen is soft  Musculoskeletal:         General: Normal range of motion  Cervical back: Normal range of motion and neck supple  Skin:     General: Skin is warm  Findings: No erythema or rash  Neurological:      Mental Status: She is alert and oriented to person, place, and time     Psychiatric:         Behavior: Behavior normal            No exam data present    Health Maintenance     Health Maintenance   Topic Date Due    COVID-19 Vaccine (1) Never done    Pneumococcal Vaccine: Pediatrics (0 to 5 Years) and At-Risk Patients (6 to 59 Years) (1 - PCV) Never done    Influenza Vaccine (1) 09/01/2022    PT PLAN OF CARE  08/14/2022    BMI: Followup Plan  04/13/2023    Depression Screening  07/19/2023    BMI: Adult  07/19/2023    Annual Physical  07/19/2023    Breast Cancer Screening: Mammogram  09/15/2023    Cervical Cancer Screening  07/07/2025    DTaP,Tdap,and Td Vaccines (2 - Td or Tdap) 06/08/2030    Colorectal Cancer Screening  07/23/2030    HIV Screening  Completed    Hepatitis C Screening  Completed    HIB Vaccine  Aged Out    Hepatitis B Vaccine  Aged Out    IPV Vaccine  Aged Out    Hepatitis A Vaccine  Aged Out  Meningococcal ACWY Vaccine  Aged Out    HPV Vaccine  Aged Out     Immunization History   Administered Date(s) Administered    Influenza, injectable, quadrivalent, preservative free 0 5 mL 11/12/2018    Tdap 06/08/2020         Dagoberto Fletcher MD

## 2025-06-01 DIAGNOSIS — K21.9 GASTROESOPHAGEAL REFLUX DISEASE WITHOUT ESOPHAGITIS: ICD-10-CM

## 2025-06-02 RX ORDER — FAMOTIDINE 20 MG/1
20 TABLET, FILM COATED ORAL DAILY
Qty: 90 TABLET | Refills: 1 | Status: SHIPPED | OUTPATIENT
Start: 2025-06-02

## 2025-06-17 ENCOUNTER — OFFICE VISIT (OUTPATIENT)
Dept: GASTROENTEROLOGY | Facility: MEDICAL CENTER | Age: 60
End: 2025-06-17
Payer: COMMERCIAL

## 2025-06-17 ENCOUNTER — TELEPHONE (OUTPATIENT)
Dept: GASTROENTEROLOGY | Facility: MEDICAL CENTER | Age: 60
End: 2025-06-17

## 2025-06-17 VITALS
HEART RATE: 75 BPM | OXYGEN SATURATION: 98 % | SYSTOLIC BLOOD PRESSURE: 144 MMHG | BODY MASS INDEX: 29.23 KG/M2 | TEMPERATURE: 97.7 F | HEIGHT: 63 IN | WEIGHT: 165 LBS | DIASTOLIC BLOOD PRESSURE: 70 MMHG

## 2025-06-17 DIAGNOSIS — R73.03 PRE-DIABETES: ICD-10-CM

## 2025-06-17 DIAGNOSIS — K21.9 GASTROESOPHAGEAL REFLUX DISEASE WITHOUT ESOPHAGITIS: ICD-10-CM

## 2025-06-17 DIAGNOSIS — E53.8 B12 DEFICIENCY: ICD-10-CM

## 2025-06-17 DIAGNOSIS — K50.00 CROHN'S DISEASE OF SMALL INTESTINE WITHOUT COMPLICATION (HCC): Primary | ICD-10-CM

## 2025-06-17 DIAGNOSIS — K59.00 CONSTIPATION, UNSPECIFIED CONSTIPATION TYPE: ICD-10-CM

## 2025-06-17 DIAGNOSIS — E61.1 IRON DEFICIENCY: ICD-10-CM

## 2025-06-17 PROCEDURE — 99214 OFFICE O/P EST MOD 30 MIN: CPT | Performed by: INTERNAL MEDICINE

## 2025-06-17 RX ORDER — LINACLOTIDE 72 UG/1
72 CAPSULE, GELATIN COATED ORAL
Qty: 30 CAPSULE | Refills: 3 | Status: SHIPPED | OUTPATIENT
Start: 2025-06-17

## 2025-06-17 RX ORDER — SODIUM CHLORIDE, SODIUM LACTATE, POTASSIUM CHLORIDE, CALCIUM CHLORIDE 600; 310; 30; 20 MG/100ML; MG/100ML; MG/100ML; MG/100ML
125 INJECTION, SOLUTION INTRAVENOUS CONTINUOUS
OUTPATIENT
Start: 2025-06-17

## 2025-06-17 NOTE — TELEPHONE ENCOUNTER
"Patient is questioning why her AVS is stating that her medication has been stopped effective today because she was under the impression and understanding that she was to continue taking this, \"for the fiber\".    She requested that this please be clarified.        Will follow up with Dr. Jaiyeola and then with patient.    "

## 2025-06-17 NOTE — ASSESSMENT & PLAN NOTE
She has a history of chronic gastroesophageal reflux and her endoscopy this year was normal.  She will continue alternating omeprazole and famotidine and consider taping her treatment to H2 blocker therapy only in the future in addition to dietary/lifestyle antireflux measures

## 2025-06-17 NOTE — TELEPHONE ENCOUNTER
Procedure: Colonoscopy  Date: November 21  Physician performing: Dr. Jaiyeola  Location of procedure:  [facility]  Instructions given to patient: Miralax  Clearances: None  Diabetic: No

## 2025-06-17 NOTE — ASSESSMENT & PLAN NOTE
While undergoing workup for iron deficiency, borderline elevated fecal calprotectin and alternating bowel habits she was found to have a single aphthous ulcer in the terminal ileum.  Biopsy showed chronic and active mild ileitis.  She denies history of regular NSAID use.  Her subsequent CT enterography was negative for any significant small bowel inflammation.  We discussed that her ileal findings may be related to mild Crohn's disease given evidence of chronicity on her ileal biopsies.  She elected for a course of budesonide and repeat colonoscopy in 6 to 12 months.  If she is found to have ongoing ileitis she may benefit from initiation of a biologic agent however if her ileitis is resolved consider alternating colonoscopy and CT enterography to evaluate for recurrence  She has a history of iron deficiency and B12 deficiency and blood work recently showed normalization of her levels.  She will continue her supplementation for now and if repeat blood work shows continued normalize level consider discontinuing the supplements in the future      Health maintenance:    Avoid live virus vaccines  Yearly flu shot  COVID vaccine and booster  Pneumonia vaccine  Shingrix  Routine skin exams with the dermatologist  Pap smear per GYN      Orders:    Calprotectin,Fecal; Future    CBC and differential; Future    Iron Panel (Includes Ferritin, Iron Sat%, Iron, and TIBC); Future    CBC and differential; Future    Vitamin B12; Future    Colonoscopy; Future     uncontrolled  hypoglycemic medications: Victoza 1.8mg daily (using intermittently - limited by nausea) , metformin ER 1000mg daily  insulin therapy: none  last HbA1c: 8.2%  microvascular complications: none  macrovascular complications: none known  ASA/VIRGILIO/statin: begin on atorvastatin 20mg at bedtime  - referral back to CDE to discuss alternative GLP1 - suspect would tolerate newer longer half-life options over liraglutide

## 2025-06-17 NOTE — PROGRESS NOTES
Name: Ila Oshea      : 1965      MRN: [id number]  Encounter Provider: Diana M Jaiyeola, MD  Encounter Date: 2025   Encounter department: [facility]  :  Assessment & Plan  Crohn's disease of small intestine without complication (HCC)  While undergoing workup for iron deficiency, borderline elevated fecal calprotectin and alternating bowel habits she was found to have a single aphthous ulcer in the terminal ileum.  Biopsy showed chronic and active mild ileitis.  She denies history of regular NSAID use.  Her subsequent CT enterography was negative for any significant small bowel inflammation.  We discussed that her ileal findings may be related to mild Crohn's disease given evidence of chronicity on her ileal biopsies.  She elected for a course of budesonide and repeat colonoscopy in 6 to 12 months.  If she is found to have ongoing ileitis she may benefit from initiation of a biologic agent however if her ileitis is resolved consider alternating colonoscopy and CT enterography to evaluate for recurrence  She has a history of iron deficiency and B12 deficiency and blood work recently showed normalization of her levels.  She will continue her supplementation for now and if repeat blood work shows continued normalize level consider discontinuing the supplements in the future      Health maintenance:    Avoid live virus vaccines  Yearly flu shot  COVID vaccine and booster  Pneumonia vaccine  Shingrix  Routine skin exams with the dermatologist  Pap smear per GYN      Orders:    Calprotectin,Fecal; Future    CBC and differential; Future    Iron Panel (Includes Ferritin, Iron Sat%, Iron, and TIBC); Future    CBC and differential; Future    Vitamin B12; Future    Colonoscopy; Future    Constipation, unspecified constipation type  She reports generalized bloating and constipation predominant symptoms.  We discussed initiation of Linzess at 72 mcg daily for  treatment.  She will contact my office in 2 weeks with an update if her symptoms to see if her dose needs to be uptitrated  Orders:    linaCLOtide (Linzess) 72 MCG CAPS; Take 72 mcg by mouth daily before breakfast    Iron deficiency         B12 deficiency         Pre-diabetes  She has a diagnosis of prediabetes and request referral to nutrition services to discuss dietary/nutrition recommendations  Orders:    Ambulatory Referral to Nutrition Services; Future    Gastroesophageal reflux disease without esophagitis  She has a history of chronic gastroesophageal reflux and her endoscopy this year was normal.  She will continue alternating omeprazole and famotidine and consider taping her treatment to H2 blocker therapy only in the future in addition to dietary/lifestyle antireflux measures           History of Present Illness   Ila Oshea is a 59 y.o. female who presents follow-up evaluation.  She has a history of ileal Crohn's disease diagnosed in 2025.    She was last seen in the GI office in March 2025    At her last office visit she was recommended to start budesonide for induction of remission for her mild ileal Crohn's disease.    Interval history: She reports feeling well with taking 9 mg of budesonide then after tapering to 6 and 3 mg she had generalized abdominal bloating, nausea.  She was having constipation predominant symptoms with her difficulty pass bowel movements every 2 to 3 days.  She denies rectal bleeding.  She is taking oral iron supplement which causes dark stools.  She reports a burning epigastric sensation for which she was using sucralfate.  She alternates omeprazole and famotidine      2/2025 CT enterography showed no evidence of bowel wall inflammation    4/2025 hemoglobin 13.6, platelets 381, ferritin 61, iron saturation 36% B12 390  IBD history:    She had a chronic history of alternating bowel habits and previously underwent stool testing showing a borderline elevated fecal  calprotectin. She also had workup showing iron deficiency and was recommended to start oral iron supplementation and schedule updated EGD and colonoscopy     January 2025 EGD was normal. Gastric biopsy showed gastritis negative for H. pylori and duodenal biopsies were normal. Colonoscopy showed a single aphthous ulcer in the terminal ileum and subcentimeter rectal polyp. Terminal ileal biopsy showed chronic and focal mild active ileitis. The remainder of her colonic biopsies were normal and the colon polyp showed a Schwann cell hamartoma.       endoscopy and colonoscopy in 2020 were endoscopically and pathologically normal       HPI  History obtained from: patient  Review of Systems   Constitutional:  Negative for chills and fever.   HENT:  Negative for ear pain and sore throat.    Eyes:  Negative for pain and visual disturbance.   Respiratory:  Negative for cough and shortness of breath.    Cardiovascular:  Negative for chest pain and palpitations.   Gastrointestinal:  Positive for abdominal pain and constipation. Negative for vomiting.   Genitourinary:  Negative for dysuria and hematuria.   Musculoskeletal:  Negative for arthralgias and back pain.   Skin:  Negative for color change and rash.   Neurological:  Negative for seizures and syncope.   All other systems reviewed and are negative.   A complete review of systems is negative other than that noted above in the HPI.    Past Medical History   Past Medical History[1]  Past Surgical History[2]  Family History[3]   reports that she has never smoked. She has never used smokeless tobacco. She reports that she does not currently use alcohol. She reports that she does not use drugs.  Current Outpatient Medications   Medication Instructions    albuterol (Ventolin HFA) 90 mcg/act inhaler 2 puffs, Inhalation, Every 6 hours PRN    amLODIPine (NORVASC) 5 mg, Oral, Daily    cyanocobalamin (VITAMIN B-12) 1,000 mcg, Oral, Daily    Diclofenac Sodium (VOLTAREN) 2 g, Topical, 4  "times daily PRN    famotidine (PEPCID) 20 mg, Oral, Daily    ferrous sulfate 324 mg, Oral, Every other day    fluticasone (FLONASE) 50 mcg/act nasal spray 1 spray, Nasal, Daily    Linzess 72 mcg, Oral, Daily before breakfast    omeprazole (PRILOSEC) 20 mg, Oral, Daily    ondansetron (ZOFRAN) 4 mg, Oral, Every 8 hours PRN    psyllium (METAMUCIL) 58.6 % powder 1 packet, Oral, Daily    sucralfate (CARAFATE) 1 g, Oral, 4 times daily    triamcinolone (KENALOG) 0.1 % ointment Topical, 2 times daily PRN   Allergies[4]   Current Medications[5]  Objective   /70 (BP Location: Right arm, Patient Position: Sitting, Cuff Size: Standard)   Pulse 75   Temp 97.7 °F (36.5 °C) (Tympanic)   Ht 5' 3\" (1.6 m)   Wt 74.8 kg (165 lb)   SpO2 98%   BMI 29.23 kg/m²     Physical Exam  Vitals and nursing note reviewed.   Constitutional:       General: She is not in acute distress.     Appearance: She is well-developed.   HENT:      Head: Normocephalic and atraumatic.     Eyes:      Conjunctiva/sclera: Conjunctivae normal.       Cardiovascular:      Rate and Rhythm: Normal rate and regular rhythm.      Heart sounds: No murmur heard.  Pulmonary:      Effort: Pulmonary effort is normal. No respiratory distress.      Breath sounds: Normal breath sounds.   Abdominal:      Palpations: Abdomen is soft.      Tenderness: There is no abdominal tenderness.     Musculoskeletal:         General: No swelling.      Cervical back: Neck supple.     Skin:     General: Skin is warm and dry.      Capillary Refill: Capillary refill takes less than 2 seconds.     Neurological:      Mental Status: She is alert.     Psychiatric:         Mood and Affect: Mood normal.            Lab Results: I personally reviewed relevant lab results.       Results for orders placed during the hospital encounter of 01/24/25    EGD    Impression  The esophagus appeared normal.  The stomach and duodenum appeared normal. Performed random biopsy to rule out celiac " disease.      RECOMMENDATION:  Await pathology results  Proceed with colonoscopy            Diana M Jaiyeola, MD               [1]   Past Medical History:  Diagnosis Date    Asthma     GERD (gastroesophageal reflux disease)     Hypertension     Prediabetes    [2]   Past Surgical History:  Procedure Laterality Date    NO PAST SURGERIES     [3]   Family History  Problem Relation Name Age of Onset    No Known Problems Mother      No Known Problems Father      No Known Problems Daughter Fely     Dementia Maternal Grandmother Aparna Ji         Without behavioral disturbance, unspecified dementia type     Heart failure Maternal Grandmother Aparnadominguez Ji     Hypertension Maternal Grandmother Aparna Ji     Diabetes type II Maternal Grandmother Aparna Ji     No Known Problems Paternal Grandfather      Breast cancer Maternal Aunt Lola         58    Breast cancer Maternal Aunt Zari         60s    No Known Problems Maternal Aunt Humera     Colon cancer Neg Hx      Ovarian cancer Neg Hx     [4] No Known Allergies  [5]   Current Outpatient Medications   Medication Sig Dispense Refill    albuterol (Ventolin HFA) 90 mcg/act inhaler Inhale 2 puffs every 6 (six) hours as needed for wheezing or shortness of breath 18 g 2    amLODIPine (NORVASC) 5 mg tablet Take 1 tablet (5 mg total) by mouth daily 90 tablet 2    cyanocobalamin (VITAMIN B-12) 1000 MCG tablet Take 1 tablet (1,000 mcg total) by mouth daily 90 tablet 1    Diclofenac Sodium (VOLTAREN) 1 % Apply 2 g topically 4 (four) times a day as needed (left foot pain) 150 g 1    famotidine (PEPCID) 20 mg tablet TAKE 1 TABLET(20 MG) BY MOUTH DAILY 90 tablet 1    ferrous sulfate 324 (65 Fe) mg Take 1 tablet (324 mg total) by mouth every other day 30 tablet 3    fluticasone (FLONASE) 50 mcg/act nasal spray 1 spray into each nostril daily (Patient not taking: Reported on 11/26/2024) 16 g 2    omeprazole (PriLOSEC) 20 mg delayed release capsule TAKE 1 CAPSULE(20 MG) BY MOUTH DAILY  30 capsule 5    ondansetron (ZOFRAN) 4 mg tablet Take 1 tablet (4 mg total) by mouth every 8 (eight) hours as needed for nausea or vomiting (Patient not taking: Reported on 2/18/2025) 20 tablet 0    polyethylene glycol (GLYCOLAX) 17 GM/SCOOP powder Take 17 g by mouth daily 510 g 1    psyllium (METAMUCIL) 58.6 % powder Take 1 packet by mouth daily 30 packet 3    sucralfate (CARAFATE) 1 g tablet Take 1 tablet (1 g total) by mouth 4 (four) times a day 120 tablet 0    triamcinolone (KENALOG) 0.1 % ointment Apply topically 2 (two) times a day as needed for rash 30 g 1     No current facility-administered medications for this visit.

## 2025-07-01 ENCOUNTER — OFFICE VISIT (OUTPATIENT)
Dept: FAMILY MEDICINE CLINIC | Facility: CLINIC | Age: 60
End: 2025-07-01

## 2025-07-01 VITALS
RESPIRATION RATE: 18 BRPM | HEART RATE: 62 BPM | SYSTOLIC BLOOD PRESSURE: 132 MMHG | OXYGEN SATURATION: 99 % | WEIGHT: 166.4 LBS | HEIGHT: 63 IN | TEMPERATURE: 98 F | BODY MASS INDEX: 29.48 KG/M2 | DIASTOLIC BLOOD PRESSURE: 84 MMHG

## 2025-07-01 DIAGNOSIS — R73.03 PREDIABETES: Primary | ICD-10-CM

## 2025-07-01 DIAGNOSIS — I10 PRIMARY HYPERTENSION: ICD-10-CM

## 2025-07-01 DIAGNOSIS — E55.9 VITAMIN D DEFICIENCY: ICD-10-CM

## 2025-07-01 DIAGNOSIS — K50.00 CROHN'S DISEASE OF SMALL INTESTINE WITHOUT COMPLICATION (HCC): ICD-10-CM

## 2025-07-01 DIAGNOSIS — E53.8 VITAMIN B12 DEFICIENCY: ICD-10-CM

## 2025-07-01 DIAGNOSIS — Z23 ENCOUNTER FOR IMMUNIZATION: ICD-10-CM

## 2025-07-01 PROCEDURE — 99214 OFFICE O/P EST MOD 30 MIN: CPT | Performed by: FAMILY MEDICINE

## 2025-07-01 PROCEDURE — 90750 HZV VACC RECOMBINANT IM: CPT

## 2025-07-01 PROCEDURE — 90471 IMMUNIZATION ADMIN: CPT

## 2025-07-01 NOTE — ASSESSMENT & PLAN NOTE
Most recent hemoglobin A1c of 6.4  Recommend exercise 30 minutes 5 days a week  Recommend low carbohydrate diet  Recommend follow-up with nutritionist outpatient  Offered metformin which she declined  Orders:    Hemoglobin A1C; Future

## 2025-07-01 NOTE — PROGRESS NOTES
Name: Ila Oshea      : 1965      MRN: [id number]  Encounter Provider: Branden Patel MD  Encounter Date: 2025   Encounter department: [facility]  :  Assessment & Plan  Prediabetes  Most recent hemoglobin A1c of 6.4  Recommend exercise 30 minutes 5 days a week  Recommend low carbohydrate diet  Recommend follow-up with nutritionist outpatient  Offered metformin which she declined  Orders:    Hemoglobin A1C; Future    Primary hypertension  Well-controlled  Continue amlodipine at 5 mg daily  Recommend ambulatory blood pressure monitoring  Recommend DASH diet  Monitor home blood pressure          Encounter for immunization    Orders:    Zoster Vaccine Recombinant IM    Crohn's disease of small intestine without complication (HCC)  Patient recently completed a course of budesonide for acute flare of her Crohn's  Not currently on Biologics due to mild disease severity  She has repeat colonoscopy planned for the next few months  Follow-up with GI outpatient    Orders:    Vitamin D 25 hydroxy; Future    Vitamin B12 deficiency  Continue B12 supplements       Vitamin D deficiency    Orders:    Vitamin D 25 hydroxy; Future    Cholecalciferol (VITAMIN D3) 1,000 units tablet; Take 1 tablet (1,000 Units total) by mouth daily          Depression Screening and Follow-up Plan: Patient was screened for depression during today's encounter. They screened negative with a PHQ-2 score of 0.        History of Present Illness   60-year-old female with a history of Crohn's disease, prediabetes and hypertension who presents today for follow-up.  She is doing well overall.  She had abdominal pain a few months ago and had to get treated with a course of budesonide.  She has finished budesonide.  She feels much better.  She is following with GI outpatient.  She is concerned about her prediabetes.  She has an appointment to meet with a nutritionist.      Review of Systems  "  Constitutional:  Negative for appetite change, chills, diaphoresis, fatigue and fever.   HENT:  Negative for congestion.    Eyes:  Negative for visual disturbance.   Respiratory:  Negative for cough, shortness of breath and wheezing.    Cardiovascular:  Negative for chest pain, palpitations and leg swelling.   Gastrointestinal:  Negative for abdominal pain, blood in stool, constipation, diarrhea, nausea and vomiting.   Genitourinary:  Negative for dysuria, hematuria and urgency.   Musculoskeletal:  Negative for arthralgias and gait problem.   Skin:  Negative for rash.   Neurological:  Negative for syncope, weakness, numbness and headaches.   Psychiatric/Behavioral:  Negative for dysphoric mood.        Objective   /84 (BP Location: Left arm, Patient Position: Sitting, Cuff Size: Standard)   Pulse 62   Temp 98 °F (36.7 °C) (Temporal)   Resp 18   Ht 5' 3\" (1.6 m)   Wt 75.5 kg (166 lb 6.4 oz)   SpO2 99%   BMI 29.48 kg/m²      Physical Exam  Constitutional:       General: She is not in acute distress.     Appearance: Normal appearance. She is well-developed. She is not ill-appearing, toxic-appearing or diaphoretic.   HENT:      Head: Normocephalic and atraumatic.      Right Ear: External ear normal.      Left Ear: External ear normal.      Nose: Nose normal.      Mouth/Throat:      Mouth: Mucous membranes are moist.      Pharynx: No oropharyngeal exudate or posterior oropharyngeal erythema.     Eyes:      General: No scleral icterus.        Right eye: No discharge.         Left eye: No discharge.      Extraocular Movements: Extraocular movements intact.       Cardiovascular:      Rate and Rhythm: Normal rate and regular rhythm.      Heart sounds: Normal heart sounds. No murmur heard.     No friction rub. No gallop.   Pulmonary:      Effort: Pulmonary effort is normal. No respiratory distress.      Breath sounds: Normal breath sounds. No stridor. No wheezing or rhonchi.   Abdominal:      General: Bowel " sounds are normal. There is no distension.      Palpations: Abdomen is soft. There is no mass.      Tenderness: There is no abdominal tenderness. There is no guarding.     Musculoskeletal:         General: Normal range of motion.      Cervical back: Normal range of motion.     Skin:     General: Skin is warm.     Neurological:      General: No focal deficit present.      Mental Status: She is alert and oriented to person, place, and time.      Gait: Gait normal.     Psychiatric:         Mood and Affect: Mood normal.         Behavior: Behavior normal.

## 2025-07-01 NOTE — ASSESSMENT & PLAN NOTE
Well-controlled  Continue amlodipine at 5 mg daily  Recommend ambulatory blood pressure monitoring  Recommend DASH diet  Monitor home blood pressure

## 2025-07-01 NOTE — ASSESSMENT & PLAN NOTE
Orders:    Vitamin D 25 hydroxy; Future    Cholecalciferol (VITAMIN D3) 1,000 units tablet; Take 1 tablet (1,000 Units total) by mouth daily

## 2025-07-01 NOTE — ASSESSMENT & PLAN NOTE
Patient recently completed a course of budesonide for acute flare of her Crohn's  Not currently on Biologics due to mild disease severity  She has repeat colonoscopy planned for the next few months  Follow-up with GI outpatient    Orders:    Vitamin D 25 hydroxy; Future

## 2025-07-10 ENCOUNTER — TELEPHONE (OUTPATIENT)
Dept: GASTROENTEROLOGY | Facility: MEDICAL CENTER | Age: 60
End: 2025-07-10

## 2025-07-10 NOTE — TELEPHONE ENCOUNTER
The patient called in to reschedule her colonoscopy. She is asking for the office to call her directly, as she has already taken off from work that day. She would like to reschedule with the office instead to avoid any scheduling issues. Please give the patient a call back if possible at 12:30.

## 2025-07-10 NOTE — LETTER
July 10, 2025    Ila [facility]  [address]   [address]  [place] [address]      Dear Ms. Sloan Dayo:    This letter is to inform you that you procedure(Colonoscopy) for 11/21/2025 with Dr. Jaiyeola at [place] needs to be rescheduled due to provider schedule change, we ask that you please give us a call to reschedule.     If you have any questions or concerns, please don't hesitate to call.    Sincerely,      [facility]  [phone]

## 2025-07-10 NOTE — TELEPHONE ENCOUNTER
Left voicemail and requested call back     Called pt to reschedule procedure from 11/21 with Dr. Jaiyeola at [place] due to Provider Schedule Change, asked pt to please give us a call to reschedule.

## 2025-07-11 NOTE — TELEPHONE ENCOUNTER
Patient called back regarding colonoscopy reschedule. She asked to speak to Peggy, tried to warm transfer to her, but in the mean time call was disconnected. Peggy in office will reach out to patient.

## 2025-07-11 NOTE — TELEPHONE ENCOUNTER
Called patient to reschedule procedure from 11/21 due to provider schedule change, pt would like this date to be a set date due to taking off of work. Pt stated that she will call the Friday before procedure to make sure of the time of arrival. Rescheduled procedure for 12/29 with Dr. Jaiyeola at [place].

## 2025-08-06 ENCOUNTER — OFFICE VISIT (OUTPATIENT)
Dept: FAMILY MEDICINE CLINIC | Facility: CLINIC | Age: 60
End: 2025-08-06

## 2025-08-06 VITALS
BODY MASS INDEX: 29.77 KG/M2 | HEART RATE: 83 BPM | OXYGEN SATURATION: 98 % | SYSTOLIC BLOOD PRESSURE: 138 MMHG | RESPIRATION RATE: 18 BRPM | HEIGHT: 63 IN | WEIGHT: 168 LBS | DIASTOLIC BLOOD PRESSURE: 72 MMHG | TEMPERATURE: 98.6 F

## 2025-08-06 DIAGNOSIS — B34.9 ACUTE VIRAL SYNDROME: Primary | ICD-10-CM

## 2025-08-06 PROCEDURE — 99214 OFFICE O/P EST MOD 30 MIN: CPT | Performed by: FAMILY MEDICINE

## 2025-08-13 ENCOUNTER — TELEPHONE (OUTPATIENT)
Age: 60
End: 2025-08-13

## 2025-08-14 ENCOUNTER — OFFICE VISIT (OUTPATIENT)
Dept: FAMILY MEDICINE CLINIC | Facility: CLINIC | Age: 60
End: 2025-08-14

## 2025-08-15 ENCOUNTER — TELEPHONE (OUTPATIENT)
Dept: FAMILY MEDICINE CLINIC | Facility: CLINIC | Age: 60
End: 2025-08-15

## 2025-08-18 ENCOUNTER — APPOINTMENT (OUTPATIENT)
Dept: LAB | Facility: CLINIC | Age: 60
End: 2025-08-18
Payer: COMMERCIAL

## 2025-08-18 ENCOUNTER — TELEPHONE (OUTPATIENT)
Dept: FAMILY MEDICINE CLINIC | Facility: CLINIC | Age: 60
End: 2025-08-18

## 2025-08-18 DIAGNOSIS — K50.00 CROHN'S DISEASE OF SMALL INTESTINE WITHOUT COMPLICATION (HCC): ICD-10-CM

## 2025-08-18 DIAGNOSIS — E55.9 VITAMIN D DEFICIENCY: ICD-10-CM

## 2025-08-18 DIAGNOSIS — R73.03 PREDIABETES: ICD-10-CM

## 2025-08-18 LAB
25(OH)D3 SERPL-MCNC: 26 NG/ML (ref 30–100)
BASOPHILS # BLD AUTO: 0.02 THOUSANDS/ÂΜL (ref 0–0.1)
BASOPHILS NFR BLD AUTO: 0 % (ref 0–1)
EOSINOPHIL # BLD AUTO: 0.03 THOUSAND/ÂΜL (ref 0–0.61)
EOSINOPHIL NFR BLD AUTO: 1 % (ref 0–6)
ERYTHROCYTE [DISTWIDTH] IN BLOOD BY AUTOMATED COUNT: 15 % (ref 11.6–15.1)
FERRITIN SERPL-MCNC: 56 NG/ML (ref 30–307)
HCT VFR BLD AUTO: 40.8 % (ref 34.8–46.1)
HGB BLD-MCNC: 12.8 G/DL (ref 11.5–15.4)
IMM GRANULOCYTES # BLD AUTO: 0.01 THOUSAND/UL (ref 0–0.2)
IMM GRANULOCYTES NFR BLD AUTO: 0 % (ref 0–2)
IRON SATN MFR SERPL: 16 % (ref 15–50)
IRON SERPL-MCNC: 67 UG/DL (ref 50–212)
LYMPHOCYTES # BLD AUTO: 1.76 THOUSANDS/ÂΜL (ref 0.6–4.47)
LYMPHOCYTES NFR BLD AUTO: 27 % (ref 14–44)
MCH RBC QN AUTO: 26.6 PG (ref 26.8–34.3)
MCHC RBC AUTO-ENTMCNC: 31.4 G/DL (ref 31.4–37.4)
MCV RBC AUTO: 85 FL (ref 82–98)
MONOCYTES # BLD AUTO: 0.47 THOUSAND/ÂΜL (ref 0.17–1.22)
MONOCYTES NFR BLD AUTO: 7 % (ref 4–12)
NEUTROPHILS # BLD AUTO: 4.13 THOUSANDS/ÂΜL (ref 1.85–7.62)
NEUTS SEG NFR BLD AUTO: 65 % (ref 43–75)
NRBC BLD AUTO-RTO: 0 /100 WBCS
PLATELET # BLD AUTO: 368 THOUSANDS/UL (ref 149–390)
PMV BLD AUTO: 10.7 FL (ref 8.9–12.7)
RBC # BLD AUTO: 4.82 MILLION/UL (ref 3.81–5.12)
TIBC SERPL-MCNC: 422.8 UG/DL (ref 250–450)
TRANSFERRIN SERPL-MCNC: 302 MG/DL (ref 203–362)
UIBC SERPL-MCNC: 356 UG/DL (ref 155–355)
VIT B12 SERPL-MCNC: 546 PG/ML (ref 180–914)
WBC # BLD AUTO: 6.42 THOUSAND/UL (ref 4.31–10.16)

## 2025-08-18 PROCEDURE — 83540 ASSAY OF IRON: CPT

## 2025-08-18 PROCEDURE — 36415 COLL VENOUS BLD VENIPUNCTURE: CPT

## 2025-08-18 PROCEDURE — 82607 VITAMIN B-12: CPT

## 2025-08-18 PROCEDURE — 83036 HEMOGLOBIN GLYCOSYLATED A1C: CPT

## 2025-08-18 PROCEDURE — 85025 COMPLETE CBC W/AUTO DIFF WBC: CPT

## 2025-08-18 PROCEDURE — 83550 IRON BINDING TEST: CPT

## 2025-08-18 PROCEDURE — 82306 VITAMIN D 25 HYDROXY: CPT

## 2025-08-18 PROCEDURE — 82728 ASSAY OF FERRITIN: CPT

## 2025-08-19 LAB
EST. AVERAGE GLUCOSE BLD GHB EST-MCNC: 131 MG/DL
HBA1C MFR BLD: 6.2 %

## 2025-08-20 ENCOUNTER — RESULTS FOLLOW-UP (OUTPATIENT)
Dept: FAMILY MEDICINE CLINIC | Facility: CLINIC | Age: 60
End: 2025-08-20

## 2025-08-21 ENCOUNTER — TELEPHONE (OUTPATIENT)
Dept: FAMILY MEDICINE CLINIC | Facility: CLINIC | Age: 60
End: 2025-08-21

## 2025-08-22 LAB — CALPROTECTIN STL-MCNC: 43.1 ÂΜG/G
